# Patient Record
Sex: FEMALE | Race: WHITE | NOT HISPANIC OR LATINO | Employment: OTHER | URBAN - METROPOLITAN AREA
[De-identification: names, ages, dates, MRNs, and addresses within clinical notes are randomized per-mention and may not be internally consistent; named-entity substitution may affect disease eponyms.]

---

## 2017-09-18 ENCOUNTER — HOSPITAL ENCOUNTER (EMERGENCY)
Facility: HOSPITAL | Age: 42
Discharge: HOME | End: 2017-09-19
Attending: EMERGENCY MEDICINE
Payer: MEDICAID

## 2017-09-18 DIAGNOSIS — R10.31 RIGHT LOWER QUADRANT ABDOMINAL PAIN: Primary | ICD-10-CM

## 2017-09-18 PROCEDURE — 99284 EMERGENCY DEPT VISIT MOD MDM: CPT | Mod: 25

## 2017-09-18 PROCEDURE — 96375 TX/PRO/DX INJ NEW DRUG ADDON: CPT

## 2017-09-18 PROCEDURE — 96374 THER/PROPH/DIAG INJ IV PUSH: CPT

## 2017-09-18 RX ORDER — ONDANSETRON 2 MG/ML
4 INJECTION INTRAMUSCULAR; INTRAVENOUS
Status: COMPLETED | OUTPATIENT
Start: 2017-09-18 | End: 2017-09-19

## 2017-09-19 VITALS
OXYGEN SATURATION: 99 % | HEIGHT: 67 IN | DIASTOLIC BLOOD PRESSURE: 75 MMHG | WEIGHT: 145 LBS | TEMPERATURE: 98 F | SYSTOLIC BLOOD PRESSURE: 116 MMHG | RESPIRATION RATE: 18 BRPM | BODY MASS INDEX: 22.76 KG/M2 | HEART RATE: 84 BPM

## 2017-09-19 LAB
ALBUMIN SERPL BCP-MCNC: 3.3 G/DL
ALP SERPL-CCNC: 106 U/L
ALT SERPL W/O P-5'-P-CCNC: 13 U/L
ANION GAP SERPL CALC-SCNC: 10 MMOL/L
AST SERPL-CCNC: 18 U/L
BASOPHILS # BLD AUTO: 0.02 K/UL
BASOPHILS NFR BLD: 0.3 %
BILIRUB SERPL-MCNC: 0.2 MG/DL
BILIRUB UR QL STRIP: NEGATIVE
BUN SERPL-MCNC: 9 MG/DL
CALCIUM SERPL-MCNC: 9.5 MG/DL
CHLORIDE SERPL-SCNC: 105 MMOL/L
CLARITY UR: CLEAR
CO2 SERPL-SCNC: 25 MMOL/L
COLOR UR: YELLOW
CREAT SERPL-MCNC: 1 MG/DL
DIFFERENTIAL METHOD: ABNORMAL
EOSINOPHIL # BLD AUTO: 0.4 K/UL
EOSINOPHIL NFR BLD: 6.2 %
ERYTHROCYTE [DISTWIDTH] IN BLOOD BY AUTOMATED COUNT: 12.8 %
EST. GFR  (AFRICAN AMERICAN): >60 ML/MIN/1.73 M^2
EST. GFR  (NON AFRICAN AMERICAN): >60 ML/MIN/1.73 M^2
GLUCOSE SERPL-MCNC: 96 MG/DL
GLUCOSE UR QL STRIP: NEGATIVE
HCT VFR BLD AUTO: 37.2 %
HGB BLD-MCNC: 13.1 G/DL
HGB UR QL STRIP: NEGATIVE
KETONES UR QL STRIP: NEGATIVE
LEUKOCYTE ESTERASE UR QL STRIP: ABNORMAL
LIPASE SERPL-CCNC: 106 U/L
LYMPHOCYTES # BLD AUTO: 2.3 K/UL
LYMPHOCYTES NFR BLD: 36.4 %
MCH RBC QN AUTO: 32.3 PG
MCHC RBC AUTO-ENTMCNC: 35.2 G/DL
MCV RBC AUTO: 92 FL
MICROSCOPIC COMMENT: ABNORMAL
MONOCYTES # BLD AUTO: 0.3 K/UL
MONOCYTES NFR BLD: 5.4 %
NEUTROPHILS # BLD AUTO: 3.2 K/UL
NEUTROPHILS NFR BLD: 51.7 %
NITRITE UR QL STRIP: NEGATIVE
PH UR STRIP: 7 [PH] (ref 5–8)
PLATELET # BLD AUTO: 233 K/UL
PMV BLD AUTO: 9.8 FL
POTASSIUM SERPL-SCNC: 3.7 MMOL/L
PROT SERPL-MCNC: 7.1 G/DL
PROT UR QL STRIP: NEGATIVE
RBC # BLD AUTO: 4.06 M/UL
RBC #/AREA URNS HPF: 5 /HPF (ref 0–4)
SODIUM SERPL-SCNC: 140 MMOL/L
SP GR UR STRIP: 1.01 (ref 1–1.03)
URN SPEC COLLECT METH UR: ABNORMAL
UROBILINOGEN UR STRIP-ACNC: NEGATIVE EU/DL
WBC # BLD AUTO: 6.26 K/UL
WBC #/AREA URNS HPF: 1 /HPF (ref 0–5)

## 2017-09-19 PROCEDURE — 85025 COMPLETE CBC W/AUTO DIFF WBC: CPT

## 2017-09-19 PROCEDURE — 80053 COMPREHEN METABOLIC PANEL: CPT

## 2017-09-19 PROCEDURE — 83690 ASSAY OF LIPASE: CPT

## 2017-09-19 PROCEDURE — 81000 URINALYSIS NONAUTO W/SCOPE: CPT

## 2017-09-19 PROCEDURE — 63600175 PHARM REV CODE 636 W HCPCS: Performed by: EMERGENCY MEDICINE

## 2017-09-19 RX ORDER — KETOROLAC TROMETHAMINE 30 MG/ML
15 INJECTION, SOLUTION INTRAMUSCULAR; INTRAVENOUS
Status: COMPLETED | OUTPATIENT
Start: 2017-09-19 | End: 2017-09-19

## 2017-09-19 RX ORDER — ONDANSETRON 4 MG/1
4 TABLET, FILM COATED ORAL EVERY 8 HOURS PRN
Qty: 12 TABLET | Refills: 0 | Status: SHIPPED | OUTPATIENT
Start: 2017-09-19 | End: 2019-11-20

## 2017-09-19 RX ORDER — HYOSCYAMINE SULFATE 0.12 MG/1
0.12 TABLET SUBLINGUAL EVERY 6 HOURS PRN
Qty: 20 TABLET | Refills: 0 | Status: SHIPPED | OUTPATIENT
Start: 2017-09-19 | End: 2019-11-20

## 2017-09-19 RX ADMIN — KETOROLAC TROMETHAMINE 15 MG: 30 INJECTION, SOLUTION INTRAMUSCULAR at 12:09

## 2017-09-19 RX ADMIN — ONDANSETRON 4 MG: 2 INJECTION INTRAMUSCULAR; INTRAVENOUS at 12:09

## 2017-09-19 NOTE — ED PROVIDER NOTES
"SCRIBE #1 NOTE: I, Vicki Dyson, am scribing for, and in the presence of, Heriberto Luo MD. I have scribed the entire note.      History      Chief Complaint   Patient presents with    Abdominal Pain     RUQ abd pain "under right rib" onset friday. "I feel bloated."       Review of patient's allergies indicates:   Allergen Reactions    Compazine [prochlorperazine] Other (See Comments)     Panic attack    Pcn [penicillins] Hives        HPI   HPI    2017, 10:30 PM   History obtained from the patient      History of Present Illness: Jaqueline Gutierrez is a 42 y.o. female patient who presents to the Emergency Department for R sided abdominal pain which onset gradually 2 days ago. Symptoms are constant, moderate in severity, and described as feeling bloated. Food makes the pain worse. No mitigating factors reported. She states that she feels nauseated and had diarrhea 3 days ago, which has resolved. Patient denies fever, chills, vomiting, dysuria, hematuria, and all other sxs at this time. No further complaints or concerns at this time.       Arrival mode: Personal vehicle    PCP: [facility]       Past Medical History:  Past Medical History:   Diagnosis Date    Thyroid disease        Past Surgical History:  Past Surgical History:   Procedure Laterality Date     SECTION      HYSTERECTOMY      KNEE SURGERY           Family History:  History reviewed. No pertinent family history.    Social History:  Social History     Social History Main Topics    Smoking status: Current Every Day Smoker     Packs/day: 0.50     Types: Cigarettes    Smokeless tobacco: Never Used    Alcohol use Yes    Drug use: No    Sexual activity: Unknown       ROS   Review of Systems   Constitutional: Negative for chills and fever.   HENT: Negative for sore throat.    Respiratory: Negative for shortness of breath.    Cardiovascular: Negative for chest pain.   Gastrointestinal: Positive for abdominal pain, diarrhea " "(resolved) and nausea. Negative for abdominal distention, anal bleeding, blood in stool and vomiting.   Genitourinary: Negative for dysuria.   Musculoskeletal: Negative for back pain.   Skin: Negative for rash.   Neurological: Negative for dizziness, weakness, numbness and headaches.   Hematological: Does not bruise/bleed easily.   All other systems reviewed and are negative.      Physical Exam      Initial Vitals [09/18/17 2158]   BP Pulse Resp Temp SpO2   (!) 153/99 100 18 97.7 °F (36.5 °C) 100 %      MAP       117          Physical Exam  Nursing Notes and Vital Signs Reviewed.  Constitutional: Patient is in no acute distress. Awake and alert. Well-developed and well-nourished.  Head: Atraumatic. Normocephalic.  Eyes: PERRL. EOM intact. Conjunctivae are not pale. No scleral icterus.  ENT: Mucous membranes are moist. Oropharynx is clear and symmetric.    Neck: Supple. Full ROM. No lymphadenopathy.  Cardiovascular: Regular rate. Regular rhythm. No murmurs, rubs, or gallops. Distal pulses are 2+ and symmetric.  Pulmonary/Chest: No respiratory distress. Clear to auscultation bilaterally. No wheezing, rales, or rhonchi.  Abdominal: Soft and non-distended.  There is RLQ tenderness.  No rebound, guarding, or rigidity.  Good bowel sounds.    : No CVA tenderness.  Musculoskeletal: Moves all extremities. No obvious deformities. No edema. No calf tenderness.  Skin: Warm and dry.  Neurological:  Alert, awake, and appropriate.  Normal speech.  No acute focal neurological deficits are appreciated.  Psychiatric: Normal affect. Good eye contact. Appropriate in content.    ED Course    Procedures  ED Vital Signs:  Vitals:    09/18/17 2158 09/19/17 0011 09/19/17 0132   BP: (!) 153/99 135/84 116/75   Pulse: 100 85 84   Resp: 18     Temp: 97.7 °F (36.5 °C)     TempSrc: Oral     SpO2: 100% 99% 99%   Weight: 65.8 kg (145 lb)     Height: 5' 7" (1.702 m)         Abnormal Lab Results:  Labs Reviewed   CBC W/ AUTO DIFFERENTIAL - " Abnormal; Notable for the following:        Result Value    MCH 32.3 (*)     All other components within normal limits   COMPREHENSIVE METABOLIC PANEL - Abnormal; Notable for the following:     Albumin 3.3 (*)     All other components within normal limits   LIPASE - Abnormal; Notable for the following:     Lipase 106 (*)     All other components within normal limits   URINALYSIS - Abnormal; Notable for the following:     Leukocytes, UA Trace (*)     All other components within normal limits   URINALYSIS MICROSCOPIC - Abnormal; Notable for the following:     RBC, UA 5 (*)     All other components within normal limits        All Lab Results:  Results for orders placed or performed during the hospital encounter of 09/18/17   CBC W/ AUTO DIFFERENTIAL   Result Value Ref Range    WBC 6.26 3.90 - 12.70 K/uL    RBC 4.06 4.00 - 5.40 M/uL    Hemoglobin 13.1 12.0 - 16.0 g/dL    Hematocrit 37.2 37.0 - 48.5 %    MCV 92 82 - 98 fL    MCH 32.3 (H) 27.0 - 31.0 pg    MCHC 35.2 32.0 - 36.0 g/dL    RDW 12.8 11.5 - 14.5 %    Platelets 233 150 - 350 K/uL    MPV 9.8 9.2 - 12.9 fL    Gran # 3.2 1.8 - 7.7 K/uL    Lymph # 2.3 1.0 - 4.8 K/uL    Mono # 0.3 0.3 - 1.0 K/uL    Eos # 0.4 0.0 - 0.5 K/uL    Baso # 0.02 0.00 - 0.20 K/uL    Gran% 51.7 38.0 - 73.0 %    Lymph% 36.4 18.0 - 48.0 %    Mono% 5.4 4.0 - 15.0 %    Eosinophil% 6.2 0.0 - 8.0 %    Basophil% 0.3 0.0 - 1.9 %    Differential Method Automated    Comp. Metabolic Panel   Result Value Ref Range    Sodium 140 136 - 145 mmol/L    Potassium 3.7 3.5 - 5.1 mmol/L    Chloride 105 95 - 110 mmol/L    CO2 25 23 - 29 mmol/L    Glucose 96 70 - 110 mg/dL    BUN, Bld 9 6 - 20 mg/dL    Creatinine 1.0 0.5 - 1.4 mg/dL    Calcium 9.5 8.7 - 10.5 mg/dL    Total Protein 7.1 6.0 - 8.4 g/dL    Albumin 3.3 (L) 3.5 - 5.2 g/dL    Total Bilirubin 0.2 0.1 - 1.0 mg/dL    Alkaline Phosphatase 106 55 - 135 U/L    AST 18 10 - 40 U/L    ALT 13 10 - 44 U/L    Anion Gap 10 8 - 16 mmol/L    eGFR if African American >60  >60 mL/min/1.73 m^2    eGFR if non African American >60 >60 mL/min/1.73 m^2   Lipase   Result Value Ref Range    Lipase 106 (H) 4 - 60 U/L   Urinalysis - Clean Catch   Result Value Ref Range    Specimen UA Urine, Clean Catch     Color, UA Yellow Yellow, Straw, Magali    Appearance, UA Clear Clear    pH, UA 7.0 5.0 - 8.0    Specific Gravity, UA 1.010 1.005 - 1.030    Protein, UA Negative Negative    Glucose, UA Negative Negative    Ketones, UA Negative Negative    Bilirubin (UA) Negative Negative    Occult Blood UA Negative Negative    Nitrite, UA Negative Negative    Urobilinogen, UA Negative <2.0 EU/dL    Leukocytes, UA Trace (A) Negative   Urinalysis Microscopic   Result Value Ref Range    RBC, UA 5 (H) 0 - 4 /hpf    WBC, UA 1 0 - 5 /hpf    Microscopic Comment SEE COMMENT      Imaging Results:  Imaging Results          CT Abdomen Pelvis  Without Contrast (Final result)  Result time 09/18/17 23:11:13    Final result by Nando David MD (09/18/17 23:11:13)                 Impression:      Contracted thick wall gallbladder. Evidence of constipation.    All CT scans at this facility use dose modulation, iterative reconstruction and/or weight based dosing when appropriate to reduce radiation dose to as low as reasonably achievable.       Electronically signed by: NANDO DAVID MD  Date:     09/18/17  Time:    23:11              Narrative:    Exam: CT ABDOMEN PELVIS WITHOUT CONTRAST,    Date:  09/18/17 22:40:00    History: Right upper quadrant abdominal pain        Findings: The gallbladder is contracted and thick walled. No abnormality of the unenhanced liver, spleen, pancreas, adrenals, or kidneys is seen. No evidence of bowel obstruction. No free fluid or free air. Evidence of hysterectomy. Moderately large amount of stool in the colon consistent with constipation. The lung bases are clear.                               The Emergency Provider reviewed the vital signs and test results, which are outlined  above.    ED Discussion   1:27 AM: Reassessed pt at this time. Discussed with pt all pertinent ED information and results. Discussed pt dx and plan of tx. Gave pt all f/u and return to the ED instructions. All questions and concerns were addressed at this time. Pt expresses understanding of information and instructions, and is comfortable with plan to discharge. Pt is stable for discharge.    I discussed with patient and/or family/caretaker that evaluation in the ED does not suggest any emergent or life threatening medical conditions requiring immediate intervention beyond what was provided in the ED, and I believe patient is safe for discharge.  Regardless, an unremarkable evaluation in the ED does not preclude the development or presence of a serious of life threatening condition. As such, patient was instructed to return immediately for any worsening or change in current symptoms.      ED Medication(s):  Medications   ondansetron injection 4 mg (4 mg Intravenous Given 9/19/17 0033)   ketorolac injection 15 mg (15 mg Intravenous Given 9/19/17 0034)       Discharge Medication List as of 9/19/2017  1:59 AM      START taking these medications    Details   hyoscyamine (LEVSIN/SL) 0.125 mg Subl Place 1 tablet (0.125 mg total) under the tongue every 6 (six) hours as needed., Starting Tue 9/19/2017, Print      ondansetron (ZOFRAN) 4 MG tablet Take 1 tablet (4 mg total) by mouth every 8 (eight) hours as needed for Nausea., Starting Tue 9/19/2017, Print             Follow-up Information     [facility]    Contact information:  [address] [facility] [address] [phone]             [facility] - .    Specialty:  Emergency Medicine  Why:  As needed, If symptoms worsen  Contact information:  [address] [facility] [address] [phone]                  Medical Decision Making    Medical Decision Making:   Clinical Tests:   Lab Tests: Reviewed  and Ordered  Radiological Study: Reviewed and Ordered           Scribe Attestation:   Scribe #1: I performed the above scribed service and the documentation accurately describes the services I performed. I attest to the accuracy of the note.    Attending:   Physician Attestation Statement for Scribe #1: I, Heriberto Luo MD, personally performed the services described in this documentation, as scribed by Vicki Dyson, in my presence, and it is both accurate and complete.          Clinical Impression       ICD-10-CM ICD-9-CM   1. Right lower quadrant abdominal pain R10.31 789.03       Disposition:   Disposition: Discharged  Condition: Stable         Heriberto Luo MD  09/19/17 0544

## 2019-11-20 ENCOUNTER — HOSPITAL ENCOUNTER (EMERGENCY)
Facility: HOSPITAL | Age: 44
Discharge: HOME | End: 2019-11-20
Attending: EMERGENCY MEDICINE
Payer: MEDICAID

## 2019-11-20 VITALS
RESPIRATION RATE: 20 BRPM | HEART RATE: 78 BPM | WEIGHT: 171.5 LBS | TEMPERATURE: 98 F | DIASTOLIC BLOOD PRESSURE: 84 MMHG | BODY MASS INDEX: 26.92 KG/M2 | OXYGEN SATURATION: 98 % | HEIGHT: 67 IN | SYSTOLIC BLOOD PRESSURE: 118 MMHG

## 2019-11-20 DIAGNOSIS — R10.84 GENERALIZED ABDOMINAL PAIN: ICD-10-CM

## 2019-11-20 DIAGNOSIS — K59.01 SLOW TRANSIT CONSTIPATION: Primary | ICD-10-CM

## 2019-11-20 LAB
ALBUMIN SERPL BCP-MCNC: 3.8 G/DL (ref 3.5–5.2)
ALP SERPL-CCNC: 107 U/L (ref 55–135)
ALT SERPL W/O P-5'-P-CCNC: 30 U/L (ref 10–44)
ANION GAP SERPL CALC-SCNC: 10 MMOL/L (ref 8–16)
AST SERPL-CCNC: 31 U/L (ref 10–40)
BASOPHILS # BLD AUTO: 0.07 K/UL (ref 0–0.2)
BASOPHILS NFR BLD: 1.2 % (ref 0–1.9)
BILIRUB SERPL-MCNC: 0.4 MG/DL (ref 0.1–1)
BILIRUB UR QL STRIP: NEGATIVE
BUN SERPL-MCNC: 10 MG/DL (ref 6–20)
CALCIUM SERPL-MCNC: 9.7 MG/DL (ref 8.7–10.5)
CHLORIDE SERPL-SCNC: 104 MMOL/L (ref 95–110)
CLARITY UR: CLEAR
CO2 SERPL-SCNC: 23 MMOL/L (ref 23–29)
COLOR UR: YELLOW
CREAT SERPL-MCNC: 1 MG/DL (ref 0.5–1.4)
DIFFERENTIAL METHOD: ABNORMAL
EOSINOPHIL # BLD AUTO: 0.8 K/UL (ref 0–0.5)
EOSINOPHIL NFR BLD: 14 % (ref 0–8)
ERYTHROCYTE [DISTWIDTH] IN BLOOD BY AUTOMATED COUNT: 15.5 % (ref 11.5–14.5)
EST. GFR  (AFRICAN AMERICAN): >60 ML/MIN/1.73 M^2
EST. GFR  (NON AFRICAN AMERICAN): >60 ML/MIN/1.73 M^2
GLUCOSE SERPL-MCNC: 85 MG/DL (ref 70–110)
GLUCOSE UR QL STRIP: NEGATIVE
HCT VFR BLD AUTO: 39.6 % (ref 37–48.5)
HGB BLD-MCNC: 12.2 G/DL (ref 12–16)
HGB UR QL STRIP: NEGATIVE
IMM GRANULOCYTES # BLD AUTO: 0.01 K/UL (ref 0–0.04)
IMM GRANULOCYTES NFR BLD AUTO: 0.2 % (ref 0–0.5)
KETONES UR QL STRIP: NEGATIVE
LEUKOCYTE ESTERASE UR QL STRIP: NEGATIVE
LYMPHOCYTES # BLD AUTO: 1.7 K/UL (ref 1–4.8)
LYMPHOCYTES NFR BLD: 28.5 % (ref 18–48)
MCH RBC QN AUTO: 26.1 PG (ref 27–31)
MCHC RBC AUTO-ENTMCNC: 30.8 G/DL (ref 32–36)
MCV RBC AUTO: 85 FL (ref 82–98)
MONOCYTES # BLD AUTO: 0.4 K/UL (ref 0.3–1)
MONOCYTES NFR BLD: 6.5 % (ref 4–15)
NEUTROPHILS # BLD AUTO: 2.9 K/UL (ref 1.8–7.7)
NEUTROPHILS NFR BLD: 49.6 % (ref 38–73)
NITRITE UR QL STRIP: NEGATIVE
NRBC BLD-RTO: 0 /100 WBC
PH UR STRIP: 6 [PH] (ref 5–8)
PLATELET # BLD AUTO: 340 K/UL (ref 150–350)
PMV BLD AUTO: 9.6 FL (ref 9.2–12.9)
POTASSIUM SERPL-SCNC: 3.9 MMOL/L (ref 3.5–5.1)
PROT SERPL-MCNC: 7.7 G/DL (ref 6–8.4)
PROT UR QL STRIP: NEGATIVE
RBC # BLD AUTO: 4.68 M/UL (ref 4–5.4)
SODIUM SERPL-SCNC: 137 MMOL/L (ref 136–145)
SP GR UR STRIP: <=1.005 (ref 1–1.03)
URN SPEC COLLECT METH UR: ABNORMAL
UROBILINOGEN UR STRIP-ACNC: NEGATIVE EU/DL
WBC # BLD AUTO: 5.86 K/UL (ref 3.9–12.7)

## 2019-11-20 PROCEDURE — 99284 EMERGENCY DEPT VISIT MOD MDM: CPT | Mod: 25

## 2019-11-20 PROCEDURE — 81003 URINALYSIS AUTO W/O SCOPE: CPT

## 2019-11-20 PROCEDURE — 80053 COMPREHEN METABOLIC PANEL: CPT

## 2019-11-20 PROCEDURE — 85025 COMPLETE CBC W/AUTO DIFF WBC: CPT

## 2019-11-20 RX ORDER — DOCUSATE SODIUM 100 MG/1
100 CAPSULE, LIQUID FILLED ORAL 3 TIMES DAILY PRN
Qty: 30 CAPSULE | Refills: 0 | OUTPATIENT
Start: 2019-11-20 | End: 2023-12-30

## 2019-11-20 RX ORDER — POLYETHYLENE GLYCOL 3350 17 G/17G
17 POWDER, FOR SOLUTION ORAL DAILY
Qty: 119 G | Refills: 0 | Status: SHIPPED | OUTPATIENT
Start: 2019-11-20 | End: 2019-11-27

## 2019-11-20 RX ORDER — LEVOTHYROXINE SODIUM 175 UG/1
175 TABLET ORAL DAILY
Qty: 30 TABLET | Refills: 11 | Status: SHIPPED | OUTPATIENT
Start: 2019-11-20 | End: 2024-01-31

## 2019-11-20 NOTE — ED NOTES
Patient c/o pain to RLQ and nausea beginning three days ago.    Patient identifiers verified and correct for Jaqueline Gutierrez.    LOC: The patient is awake, alert and aware of environment with an appropriate affect, the patient is oriented x 3 and speaking appropriately.  APPEARANCE: Patient resting comfortably and in no acute distress, patient is clean and well groomed, patient's clothing is properly fastened.  SKIN: The skin is warm and dry, color consistent with ethnicity, patient has normal skin turgor and moist mucus membranes, skin intact, no breakdown or bruising noted.  MUSCULOSKELETAL: Patient moving all extremities spontaneously.  RESPIRATORY: Airway is open and patent, respirations are spontaneous.  CARDIAC: Patient has a normal rate, no peripheral edema noted, capillary refill < 3 seconds.  ABDOMEN: Soft; RLQ tender to palpation.

## 2019-11-20 NOTE — ED PROVIDER NOTES
11/20/2019, 11:37 AM   History obtained from the patient      History of Present Illness: Jaqueline Gutierrez is a 44 y.o. female patient who presents to the Emergency Department for RLQ abdominal pain which onset gradually 2 days ago.    11:37 AM: Pt was placed in TL. I explained to pt that due to lack of available rooms pt was seen by myself to begin the workup. Pt understands they will be seen and dispositioned by the next available physician. Pt voices understanding and agrees with plan. Pt also understands the longer than normal wait time. I am removing myself from the care of pt and pt will now be assigned to the next available physician.           SCRIBE #1 NOTE: I, Jerel Leal, am scribing for, and in the presence of, Isaac Lugo MD. I have scribed the entire note.       History     Chief Complaint   Patient presents with    Abdominal Pain     Pt c/o of RLQ abd pain with nausea xs a couple of days. Pain with palpation and sitting up. Denies fevers.      Review of patient's allergies indicates:   Allergen Reactions    Compazine [prochlorperazine] Other (See Comments)     Panic attack    Pcn [penicillins] Hives         History of Present Illness     HPI    11/20/2019, 12:15 PM  History obtained from the patient      History of Present Illness: Jaqueline Gutierrez is a 44 y.o. female patient who presents to the Emergency Department for evaluation of RLQ abd pain which onset gradually 2 days ago. Symptoms are constant and moderate in severity. Sx are exacerbated by sitting upright. Associated sxs include nausea. Patient denies any fever, v/d, constipation, blood in stool, dysuria, hematuria, and all other sxs at this time. No prior Tx reported. No further complaints or concerns at this time.       Arrival mode: Personal transportation    PCP: [facility]        Past Medical History:  Past Medical History:   Diagnosis Date    Thyroid disease        Past Surgical History:  Past Surgical  History:   Procedure Laterality Date     SECTION      HYSTERECTOMY      KNEE SURGERY           Family History:  History reviewed. No pertinent family history.    Social History:  Social History     Tobacco Use    Smoking status: Current Every Day Smoker     Packs/day: 0.50     Types: Cigarettes    Smokeless tobacco: Never Used   Substance and Sexual Activity    Alcohol use: Yes     Comment: occasional    Drug use: No    Sexual activity: unknown        Review of Systems     Review of Systems   Constitutional: Negative for fever.   HENT: Negative for sore throat.    Respiratory: Negative for shortness of breath.    Cardiovascular: Negative for chest pain.   Gastrointestinal: Positive for abdominal pain and nausea. Negative for blood in stool, constipation, diarrhea and vomiting.   Genitourinary: Negative for dysuria and hematuria.   Musculoskeletal: Negative for back pain.   Skin: Negative for rash.   Neurological: Negative for weakness.   Hematological: Does not bruise/bleed easily.   All other systems reviewed and are negative.       Physical Exam     Initial Vitals   BP Pulse Resp Temp SpO2   19 1136 19 1136 19 1136 19 1138 19 1136   129/82 97 20 98.3 °F (36.8 °C) 98 %      MAP       --                 Physical Exam  Nursing Notes and Vital Signs Reviewed.  Constitutional: Well-developed and well-nourished. NAD.  Head: Atraumatic. Normocephalic.  Eyes: PERRL. EOM intact. Conjunctivae are not pale. No scleral icterus.  ENT: Mucous membranes are moist. Oropharynx is clear and symmetric.    Neck: Supple. Full ROM. No lymphadenopathy.  Cardiovascular: Regular rate. Regular rhythm. No murmurs, rubs, or gallops. Distal pulses are 2+ and symmetric.  Pulmonary/Chest: No respiratory distress. Clear to auscultation bilaterally. No wheezing or rales.  Abdominal: Soft and non-distended.  There is no tenderness.  No rebound, guarding, or rigidity. Good bowel sounds.  Genitourinary:  "No CVA tenderness  Musculoskeletal: Moves all extremities. No obvious deformities. No calf tenderness.  Skin: Warm and dry.  Neurological:  Alert, awake, and appropriate.  Normal speech.  No acute focal neurological deficits are appreciated.  Psychiatric: Normal affect. Good eye contact. Appropriate in content.     ED Course   Procedures  ED Vital Signs:  Vitals:    11/20/19 1136 11/20/19 1138 11/20/19 1400   BP: 129/82  118/84   Pulse: 97  78   Resp: 20  20   Temp:  98.3 °F (36.8 °C) 98.3 °F (36.8 °C)   TempSrc: Oral Axillary Oral   SpO2: 98%  98%   Weight: 77.8 kg (171 lb 8.3 oz)     Height: 5' 7" (1.702 m)         Abnormal Lab Results:  Labs Reviewed   CBC W/ AUTO DIFFERENTIAL - Abnormal; Notable for the following components:       Result Value    Mean Corpuscular Hemoglobin 26.1 (*)     Mean Corpuscular Hemoglobin Conc 30.8 (*)     RDW 15.5 (*)     Eos # 0.8 (*)     Eosinophil% 14.0 (*)     All other components within normal limits   URINALYSIS, REFLEX TO URINE CULTURE - Abnormal; Notable for the following components:    Specific Gravity, UA <=1.005 (*)     All other components within normal limits    Narrative:     Preferred Collection Type->Urine, Clean Catch   COMPREHENSIVE METABOLIC PANEL        All Lab Results:  Results for orders placed or performed during the hospital encounter of 11/20/19   CBC auto differential   Result Value Ref Range    WBC 5.86 3.90 - 12.70 K/uL    RBC 4.68 4.00 - 5.40 M/uL    Hemoglobin 12.2 12.0 - 16.0 g/dL    Hematocrit 39.6 37.0 - 48.5 %    Mean Corpuscular Volume 85 82 - 98 fL    Mean Corpuscular Hemoglobin 26.1 (L) 27.0 - 31.0 pg    Mean Corpuscular Hemoglobin Conc 30.8 (L) 32.0 - 36.0 g/dL    RDW 15.5 (H) 11.5 - 14.5 %    Platelets 340 150 - 350 K/uL    MPV 9.6 9.2 - 12.9 fL    Immature Granulocytes 0.2 0.0 - 0.5 %    Gran # (ANC) 2.9 1.8 - 7.7 K/uL    Immature Grans (Abs) 0.01 0.00 - 0.04 K/uL    Lymph # 1.7 1.0 - 4.8 K/uL    Mono # 0.4 0.3 - 1.0 K/uL    Eos # 0.8 (H) 0.0 - " 0.5 K/uL    Baso # 0.07 0.00 - 0.20 K/uL    nRBC 0 0 /100 WBC    Gran% 49.6 38.0 - 73.0 %    Lymph% 28.5 18.0 - 48.0 %    Mono% 6.5 4.0 - 15.0 %    Eosinophil% 14.0 (H) 0.0 - 8.0 %    Basophil% 1.2 0.0 - 1.9 %    Differential Method Automated    Comprehensive metabolic panel   Result Value Ref Range    Sodium 137 136 - 145 mmol/L    Potassium 3.9 3.5 - 5.1 mmol/L    Chloride 104 95 - 110 mmol/L    CO2 23 23 - 29 mmol/L    Glucose 85 70 - 110 mg/dL    BUN, Bld 10 6 - 20 mg/dL    Creatinine 1.0 0.5 - 1.4 mg/dL    Calcium 9.7 8.7 - 10.5 mg/dL    Total Protein 7.7 6.0 - 8.4 g/dL    Albumin 3.8 3.5 - 5.2 g/dL    Total Bilirubin 0.4 0.1 - 1.0 mg/dL    Alkaline Phosphatase 107 55 - 135 U/L    AST 31 10 - 40 U/L    ALT 30 10 - 44 U/L    Anion Gap 10 8 - 16 mmol/L    eGFR if African American >60 >60 mL/min/1.73 m^2    eGFR if non African American >60 >60 mL/min/1.73 m^2   Urinalysis, Reflex to Urine Culture Urine, Clean Catch   Result Value Ref Range    Specimen UA Urine, Clean Catch     Color, UA Yellow Yellow, Straw, Magali    Appearance, UA Clear Clear    pH, UA 6.0 5.0 - 8.0    Specific Gravity, UA <=1.005 (A) 1.005 - 1.030    Protein, UA Negative Negative    Glucose, UA Negative Negative    Ketones, UA Negative Negative    Bilirubin (UA) Negative Negative    Occult Blood UA Negative Negative    Nitrite, UA Negative Negative    Urobilinogen, UA Negative <2.0 EU/dL    Leukocytes, UA Negative Negative         Imaging Results:  Imaging Results          CT Renal Stone Study ABD Pelvis WO (Final result)  Result time 11/20/19 13:54:51    Final result by Dylan Park MD (11/20/19 13:54:51)                 Impression:      No evidence of renal, ureteral, or bladder stones.  No hydronephrosis.    Fecalization of distal small bowel indicating slow transit of small bowel contents. No evidence of bowel obstruction.  Normal appendix. Moderate volume stool cecum, right colon, descending colon, sigmoid.      Electronically signed  by: Dylan Park  Date:    11/20/2019  Time:    13:54             Narrative:    EXAMINATION:  CT RENAL STONE STUDY ABD PELVIS WO    CLINICAL HISTORY:  Flank pain, stone disease suspected;    TECHNIQUE:  Low dose axial images, sagittal and coronal reformations were obtained from the lung bases to the pubic symphysis.  Contrast was not administered.    All CT scans at this location are performed using dose modulation techniques as appropriate to a performed exam including the following: Automated exposure control; adjustment of the mA and/or kV according to patient size.    COMPARISON:  09/18/2017    FINDINGS:  Heart: Normal in size. No pericardial effusion.    Lung Bases: Well aerated, without consolidation or pleural fluid.    Liver: Normal in size and attenuation, with no focal hepatic lesions.    Gallbladder: No calcified gallstones.    Bile Ducts: No evidence of dilated ducts.    Pancreas: No mass or peripancreatic fat stranding.    Spleen: Unremarkable.    Adrenals: Unremarkable.    Kidneys/ Ureters: Unremarkable.    Bladder: No evidence of wall thickening.    Reproductive organs: Uterus and ovaries surgically absent    GI Tract/Mesentery: Stomach, duodenum unremarkable. Fecalization of distal small bowel indicating slow transit of small bowel contents. No evidence of bowel obstruction.  Normal appendix.  Moderate volume stool cecum, right colon, descending colon, sigmoid.    Peritoneal Space: No ascites. No free air.    Retroperitoneum: No significant adenopathy.    Abdominal wall: Unremarkable.    Vasculature: No significant atherosclerosis or aneurysm.    Bones: No acute fracture.                                     The Emergency Provider reviewed the vital signs and test results, which are outlined above.     ED Discussion       2:04 PM: Reassessed pt at this time.  Pt states her condition has improved at this time. Discussed with pt all pertinent ED information and results. Discussed pt dx and plan of tx.  Gave pt all f/u and return to the ED instructions. All questions and concerns were addressed at this time. Pt expresses understanding of information and instructions, and is comfortable with plan to discharge. Pt is stable for discharge.    I discussed with patient and/or family/caretaker that evaluation in the ED does not suggest any emergent or life threatening medical conditions requiring immediate intervention beyond what was provided in the ED, and I believe patient is safe for discharge.  Regardless, an unremarkable evaluation in the ED does not preclude the development or presence of a serious of life threatening condition. As such, patient was instructed to return immediately for any worsening or change in current symptoms.       MDM        Medical Decision Making:   Clinical Tests:   Lab Tests: Ordered and Reviewed  Radiological Study: Reviewed and Ordered           ED Medication(s):  Medications - No data to display    New Prescriptions    DOCUSATE SODIUM (COLACE) 100 MG CAPSULE    Take 1 capsule (100 mg total) by mouth 3 (three) times daily as needed for Constipation.    LEVOTHYROXINE (SYNTHROID, LEVOTHROID) 175 MCG TABLET    Take 1 tablet (175 mcg total) by mouth once daily.    POLYETHYLENE GLYCOL (GLYCOLAX) 17 GRAM/DOSE POWDER    Take 17 g by mouth once daily. for 7 days        Medication List      START taking these medications    docusate sodium 100 MG capsule  Commonly known as:  COLACE  Take 1 capsule (100 mg total) by mouth 3 (three) times daily as needed for Constipation.     levothyroxine 175 MCG tablet  Commonly known as:  SYNTHROID, LEVOTHROID  Take 1 tablet (175 mcg total) by mouth once daily.     polyethylene glycol 17 gram/dose powder  Commonly known as:  GLYCOLAX  Take 17 g by mouth once daily. for 7 days           Where to Get Your Medications      You can get these medications from any pharmacy    Bring a paper prescription for each of these medications  · docusate sodium 100 MG  capsule  · levothyroxine 175 MCG tablet  · polyethylene glycol 17 gram/dose powder         Follow-up Information     [facility] In 2 days.    Contact information:  [address] [facility] [address] [phone]                       Scribe Attestation:   Scribe #1: I performed the above scribed service and the documentation accurately describes the services I performed. I attest to the accuracy of the note.     Attending:   Physician Attestation Statement for Scribe #1: I, Isaac Lugo MD, personally performed the services described in this documentation, as scribed by Jerel Leal, in my presence, and it is both accurate and complete.           Clinical Impression       ICD-10-CM ICD-9-CM   1. Slow transit constipation K59.01 564.01   2. Generalized abdominal pain R10.84 789.07       Disposition:   Disposition: Discharged  Condition: Stable         Isaac Lugo MD  11/20/19 5515

## 2021-07-17 ENCOUNTER — HOSPITAL ENCOUNTER (EMERGENCY)
Facility: HOSPITAL | Age: 46
Discharge: HOME | End: 2021-07-17
Attending: EMERGENCY MEDICINE
Payer: MEDICAID

## 2021-07-17 VITALS
RESPIRATION RATE: 20 BRPM | WEIGHT: 173.94 LBS | SYSTOLIC BLOOD PRESSURE: 146 MMHG | TEMPERATURE: 98 F | OXYGEN SATURATION: 99 % | DIASTOLIC BLOOD PRESSURE: 92 MMHG | HEART RATE: 83 BPM | HEIGHT: 67 IN | BODY MASS INDEX: 27.3 KG/M2

## 2021-07-17 DIAGNOSIS — R06.02 SOB (SHORTNESS OF BREATH): ICD-10-CM

## 2021-07-17 DIAGNOSIS — F41.1 ANXIETY REACTION: Primary | ICD-10-CM

## 2021-07-17 DIAGNOSIS — T43.625A ADVERSE EFFECT OF AMPHETAMINES, INITIAL ENCOUNTER: ICD-10-CM

## 2021-07-17 LAB
ALBUMIN SERPL BCP-MCNC: 3.7 G/DL (ref 3.5–5.2)
ALP SERPL-CCNC: 102 U/L (ref 55–135)
ALT SERPL W/O P-5'-P-CCNC: 23 U/L (ref 10–44)
AMPHET+METHAMPHET UR QL: ABNORMAL
ANION GAP SERPL CALC-SCNC: 11 MMOL/L (ref 8–16)
AST SERPL-CCNC: 21 U/L (ref 10–40)
B-HCG UR QL: NEGATIVE
BARBITURATES UR QL SCN>200 NG/ML: NEGATIVE
BASOPHILS # BLD AUTO: 0.05 K/UL (ref 0–0.2)
BASOPHILS NFR BLD: 0.9 % (ref 0–1.9)
BENZODIAZ UR QL SCN>200 NG/ML: NEGATIVE
BILIRUB SERPL-MCNC: 0.3 MG/DL (ref 0.1–1)
BILIRUB UR QL STRIP: NEGATIVE
BNP SERPL-MCNC: 11 PG/ML (ref 0–99)
BUN SERPL-MCNC: 12 MG/DL (ref 6–20)
BZE UR QL SCN: NEGATIVE
CALCIUM SERPL-MCNC: 9.3 MG/DL (ref 8.7–10.5)
CANNABINOIDS UR QL SCN: NEGATIVE
CHLORIDE SERPL-SCNC: 105 MMOL/L (ref 95–110)
CLARITY UR: CLEAR
CO2 SERPL-SCNC: 24 MMOL/L (ref 23–29)
COLOR UR: YELLOW
CREAT SERPL-MCNC: 1.1 MG/DL (ref 0.5–1.4)
CREAT UR-MCNC: 57.7 MG/DL (ref 15–325)
DIFFERENTIAL METHOD: ABNORMAL
EOSINOPHIL # BLD AUTO: 0.7 K/UL (ref 0–0.5)
EOSINOPHIL NFR BLD: 12.1 % (ref 0–8)
ERYTHROCYTE [DISTWIDTH] IN BLOOD BY AUTOMATED COUNT: 13.2 % (ref 11.5–14.5)
EST. GFR  (AFRICAN AMERICAN): >60 ML/MIN/1.73 M^2
EST. GFR  (NON AFRICAN AMERICAN): >60 ML/MIN/1.73 M^2
ETHANOL SERPL-MCNC: <10 MG/DL
GLUCOSE SERPL-MCNC: 98 MG/DL (ref 70–110)
GLUCOSE UR QL STRIP: NEGATIVE
HCT VFR BLD AUTO: 44.2 % (ref 37–48.5)
HCV AB SERPL QL IA: NEGATIVE
HEP C VIRUS HOLD SPECIMEN: NORMAL
HGB BLD-MCNC: 14.1 G/DL (ref 12–16)
HGB UR QL STRIP: NEGATIVE
HIV 1+2 AB+HIV1 P24 AG SERPL QL IA: NEGATIVE
IMM GRANULOCYTES # BLD AUTO: 0.01 K/UL (ref 0–0.04)
IMM GRANULOCYTES NFR BLD AUTO: 0.2 % (ref 0–0.5)
KETONES UR QL STRIP: NEGATIVE
LEUKOCYTE ESTERASE UR QL STRIP: NEGATIVE
LYMPHOCYTES # BLD AUTO: 2.1 K/UL (ref 1–4.8)
LYMPHOCYTES NFR BLD: 36.5 % (ref 18–48)
MCH RBC QN AUTO: 28.8 PG (ref 27–31)
MCHC RBC AUTO-ENTMCNC: 31.9 G/DL (ref 32–36)
MCV RBC AUTO: 90 FL (ref 82–98)
METHADONE UR QL SCN>300 NG/ML: NEGATIVE
MONOCYTES # BLD AUTO: 0.4 K/UL (ref 0.3–1)
MONOCYTES NFR BLD: 6.8 % (ref 4–15)
NEUTROPHILS # BLD AUTO: 2.5 K/UL (ref 1.8–7.7)
NEUTROPHILS NFR BLD: 43.5 % (ref 38–73)
NITRITE UR QL STRIP: NEGATIVE
NRBC BLD-RTO: 0 /100 WBC
OPIATES UR QL SCN: NEGATIVE
PCP UR QL SCN>25 NG/ML: NEGATIVE
PH UR STRIP: 7 [PH] (ref 5–8)
PLATELET # BLD AUTO: 294 K/UL (ref 150–450)
PMV BLD AUTO: 9.7 FL (ref 9.2–12.9)
POTASSIUM SERPL-SCNC: 4 MMOL/L (ref 3.5–5.1)
PROT SERPL-MCNC: 7.2 G/DL (ref 6–8.4)
PROT UR QL STRIP: NEGATIVE
RBC # BLD AUTO: 4.9 M/UL (ref 4–5.4)
SODIUM SERPL-SCNC: 140 MMOL/L (ref 136–145)
SP GR UR STRIP: 1.02 (ref 1–1.03)
TOXICOLOGY INFORMATION: ABNORMAL
TROPONIN I SERPL DL<=0.01 NG/ML-MCNC: 0.01 NG/ML (ref 0–0.03)
TROPONIN I SERPL DL<=0.01 NG/ML-MCNC: 0.01 NG/ML (ref 0–0.03)
TSH SERPL DL<=0.005 MIU/L-ACNC: 1.36 UIU/ML (ref 0.4–4)
URN SPEC COLLECT METH UR: NORMAL
UROBILINOGEN UR STRIP-ACNC: NEGATIVE EU/DL
WBC # BLD AUTO: 5.7 K/UL (ref 3.9–12.7)

## 2021-07-17 PROCEDURE — 87389 HIV-1 AG W/HIV-1&-2 AB AG IA: CPT | Performed by: FAMILY MEDICINE

## 2021-07-17 PROCEDURE — 81003 URINALYSIS AUTO W/O SCOPE: CPT | Mod: 59 | Performed by: EMERGENCY MEDICINE

## 2021-07-17 PROCEDURE — 81025 URINE PREGNANCY TEST: CPT | Performed by: EMERGENCY MEDICINE

## 2021-07-17 PROCEDURE — 25500020 PHARM REV CODE 255: Performed by: EMERGENCY MEDICINE

## 2021-07-17 PROCEDURE — 80307 DRUG TEST PRSMV CHEM ANLYZR: CPT | Performed by: EMERGENCY MEDICINE

## 2021-07-17 PROCEDURE — 25000003 PHARM REV CODE 250: Performed by: EMERGENCY MEDICINE

## 2021-07-17 PROCEDURE — 82077 ASSAY SPEC XCP UR&BREATH IA: CPT | Performed by: EMERGENCY MEDICINE

## 2021-07-17 PROCEDURE — 83880 ASSAY OF NATRIURETIC PEPTIDE: CPT | Performed by: EMERGENCY MEDICINE

## 2021-07-17 PROCEDURE — 80053 COMPREHEN METABOLIC PANEL: CPT | Performed by: EMERGENCY MEDICINE

## 2021-07-17 PROCEDURE — 84484 ASSAY OF TROPONIN QUANT: CPT | Mod: 91 | Performed by: EMERGENCY MEDICINE

## 2021-07-17 PROCEDURE — 99285 EMERGENCY DEPT VISIT HI MDM: CPT | Mod: 25

## 2021-07-17 PROCEDURE — 84443 ASSAY THYROID STIM HORMONE: CPT | Performed by: EMERGENCY MEDICINE

## 2021-07-17 PROCEDURE — 85025 COMPLETE CBC W/AUTO DIFF WBC: CPT | Performed by: EMERGENCY MEDICINE

## 2021-07-17 PROCEDURE — 86803 HEPATITIS C AB TEST: CPT | Performed by: FAMILY MEDICINE

## 2021-07-17 RX ORDER — BUTALBITAL, ACETAMINOPHEN AND CAFFEINE 50; 325; 40 MG/1; MG/1; MG/1
1 TABLET ORAL
Status: COMPLETED | OUTPATIENT
Start: 2021-07-17 | End: 2021-07-17

## 2021-07-17 RX ADMIN — BUTALBITAL, ACETAMINOPHEN, AND CAFFEINE 1 TABLET: 50; 325; 40 TABLET ORAL at 06:07

## 2021-07-17 RX ADMIN — IOHEXOL 100 ML: 350 INJECTION, SOLUTION INTRAVENOUS at 09:07

## 2023-12-30 ENCOUNTER — HOSPITAL ENCOUNTER (EMERGENCY)
Facility: HOSPITAL | Age: 48
Discharge: HOME | End: 2023-12-30
Attending: EMERGENCY MEDICINE
Payer: MEDICAID

## 2023-12-30 VITALS
HEART RATE: 96 BPM | SYSTOLIC BLOOD PRESSURE: 103 MMHG | WEIGHT: 156 LBS | RESPIRATION RATE: 20 BRPM | OXYGEN SATURATION: 97 % | BODY MASS INDEX: 24.48 KG/M2 | TEMPERATURE: 98 F | HEIGHT: 67 IN | DIASTOLIC BLOOD PRESSURE: 73 MMHG

## 2023-12-30 DIAGNOSIS — R10.32 LLQ ABDOMINAL PAIN: ICD-10-CM

## 2023-12-30 DIAGNOSIS — K92.2 LOWER GI BLEEDING: Primary | ICD-10-CM

## 2023-12-30 LAB
ABO + RH BLD: NORMAL
ALBUMIN SERPL BCP-MCNC: 3.4 G/DL (ref 3.5–5.2)
ALP SERPL-CCNC: 99 U/L (ref 55–135)
ALT SERPL W/O P-5'-P-CCNC: 40 U/L (ref 10–44)
ANION GAP SERPL CALC-SCNC: 12 MMOL/L (ref 8–16)
APTT PPP: 27.9 SEC (ref 21–32)
AST SERPL-CCNC: 30 U/L (ref 10–40)
BASOPHILS # BLD AUTO: 0.02 K/UL (ref 0–0.2)
BASOPHILS NFR BLD: 0.4 % (ref 0–1.9)
BILIRUB SERPL-MCNC: 0.6 MG/DL (ref 0.1–1)
BILIRUB UR QL STRIP: NEGATIVE
BLD GP AB SCN CELLS X3 SERPL QL: NORMAL
BUN SERPL-MCNC: 6 MG/DL (ref 6–20)
CALCIUM SERPL-MCNC: 8.6 MG/DL (ref 8.7–10.5)
CHLORIDE SERPL-SCNC: 102 MMOL/L (ref 95–110)
CLARITY UR: CLEAR
CO2 SERPL-SCNC: 24 MMOL/L (ref 23–29)
COLOR UR: COLORLESS
CREAT SERPL-MCNC: 1.1 MG/DL (ref 0.5–1.4)
DIFFERENTIAL METHOD BLD: ABNORMAL
EOSINOPHIL # BLD AUTO: 0.1 K/UL (ref 0–0.5)
EOSINOPHIL NFR BLD: 1.4 % (ref 0–8)
ERYTHROCYTE [DISTWIDTH] IN BLOOD BY AUTOMATED COUNT: 12.4 % (ref 11.5–14.5)
EST. GFR  (NO RACE VARIABLE): >60 ML/MIN/1.73 M^2
GLUCOSE SERPL-MCNC: 132 MG/DL (ref 70–110)
GLUCOSE UR QL STRIP: NEGATIVE
HCT VFR BLD AUTO: 43.5 % (ref 37–48.5)
HCV AB SERPL QL IA: NEGATIVE
HEP C VIRUS HOLD SPECIMEN: NORMAL
HGB BLD-MCNC: 14.8 G/DL (ref 12–16)
HGB UR QL STRIP: NEGATIVE
HIV 1+2 AB+HIV1 P24 AG SERPL QL IA: NEGATIVE
IMM GRANULOCYTES # BLD AUTO: 0.03 K/UL (ref 0–0.04)
IMM GRANULOCYTES NFR BLD AUTO: 0.5 % (ref 0–0.5)
INR PPP: 1 (ref 0.8–1.2)
KETONES UR QL STRIP: NEGATIVE
LEUKOCYTE ESTERASE UR QL STRIP: NEGATIVE
LIPASE SERPL-CCNC: 55 U/L (ref 4–60)
LYMPHOCYTES # BLD AUTO: 1 K/UL (ref 1–4.8)
LYMPHOCYTES NFR BLD: 17.8 % (ref 18–48)
MCH RBC QN AUTO: 31 PG (ref 27–31)
MCHC RBC AUTO-ENTMCNC: 34 G/DL (ref 32–36)
MCV RBC AUTO: 91 FL (ref 82–98)
MONOCYTES # BLD AUTO: 0.3 K/UL (ref 0.3–1)
MONOCYTES NFR BLD: 4.5 % (ref 4–15)
NEUTROPHILS # BLD AUTO: 4.2 K/UL (ref 1.8–7.7)
NEUTROPHILS NFR BLD: 75.4 % (ref 38–73)
NITRITE UR QL STRIP: NEGATIVE
NRBC BLD-RTO: 0 /100 WBC
PH UR STRIP: 8 [PH] (ref 5–8)
PLATELET # BLD AUTO: 213 K/UL (ref 150–450)
PMV BLD AUTO: 10.1 FL (ref 9.2–12.9)
POTASSIUM SERPL-SCNC: 3.7 MMOL/L (ref 3.5–5.1)
PROT SERPL-MCNC: 6.5 G/DL (ref 6–8.4)
PROT UR QL STRIP: NEGATIVE
PROTHROMBIN TIME: 10.5 SEC (ref 9–12.5)
RBC # BLD AUTO: 4.78 M/UL (ref 4–5.4)
SODIUM SERPL-SCNC: 138 MMOL/L (ref 136–145)
SP GR UR STRIP: <1.005 (ref 1–1.03)
SPECIMEN OUTDATE: NORMAL
URN SPEC COLLECT METH UR: ABNORMAL
UROBILINOGEN UR STRIP-ACNC: NEGATIVE EU/DL
WBC # BLD AUTO: 5.56 K/UL (ref 3.9–12.7)

## 2023-12-30 PROCEDURE — 86803 HEPATITIS C AB TEST: CPT | Performed by: EMERGENCY MEDICINE

## 2023-12-30 PROCEDURE — 80053 COMPREHEN METABOLIC PANEL: CPT | Performed by: NURSE PRACTITIONER

## 2023-12-30 PROCEDURE — 85730 THROMBOPLASTIN TIME PARTIAL: CPT | Performed by: NURSE PRACTITIONER

## 2023-12-30 PROCEDURE — 83690 ASSAY OF LIPASE: CPT | Performed by: NURSE PRACTITIONER

## 2023-12-30 PROCEDURE — 99283 EMERGENCY DEPT VISIT LOW MDM: CPT

## 2023-12-30 PROCEDURE — 86901 BLOOD TYPING SEROLOGIC RH(D): CPT | Performed by: NURSE PRACTITIONER

## 2023-12-30 PROCEDURE — 85610 PROTHROMBIN TIME: CPT | Performed by: NURSE PRACTITIONER

## 2023-12-30 PROCEDURE — 85025 COMPLETE CBC W/AUTO DIFF WBC: CPT | Performed by: NURSE PRACTITIONER

## 2023-12-30 PROCEDURE — 81003 URINALYSIS AUTO W/O SCOPE: CPT | Performed by: NURSE PRACTITIONER

## 2023-12-30 PROCEDURE — 87389 HIV-1 AG W/HIV-1&-2 AB AG IA: CPT | Performed by: EMERGENCY MEDICINE

## 2023-12-30 NOTE — FIRST PROVIDER EVALUATION
Medical screening examination initiated.  I have conducted a focused provider triage encounter, findings are as follows:    Brief history of present illness:  patient presents to ER for rectal bleeding and generalized abdominal pain, onset last night.    There were no vitals filed for this visit.    Pertinent physical exam:  no acute distress    Brief workup plan:  workup    Preliminary workup initiated; this workup will be continued and followed by the physician or advanced practice provider that is assigned to the patient when roomed.

## 2023-12-30 NOTE — ED PROVIDER NOTES
SCRIBE #1 NOTE: I, Robyn Dixon, am scribing for, and in the presence of, Madi Rodriguez MD. I have scribed the entire note.       History     Chief Complaint   Patient presents with    Rectal Bleeding     Rectal bleeding started last night, states she had diarrhea last night, abdominal cramps, and noted blood in stool. Denies taking blood thinners.     HPI  2023, 5:56 PM  History obtained from the patient    HPI:  Jaqueline Gutierrez is a 48 y.o. female with a PMH of GERD and Hashimoto's who presents to the [facility] emergency department for evaluation of rectal bleeding which onset last night. Pt reports BM looks like tissue, and episode in the ED was dark red. Associated symptoms include diarrhea, nausea, and generalized lower abdominal cramping. Pt denies abd cramping before diarrhea. Pt reports sxs onset following drinking Hebo cream, and pt notes abd cramping she is experiencing is similar to cramping she gets following dairy consumption. Pt denies a PMHx of Crohn's disease and Ulcerative Colitis. Pt has not had a colonoscopy performed. No other complaints or concerns.     Arrival mode: Personal vehicle        PCP: [facility], Provider    Review of patient's allergies indicates:   Allergen Reactions    Compazine [prochlorperazine] Other (See Comments)     Panic attack    Pcn [penicillins] Hives      Past Medical History:   Diagnosis Date    Thyroid disease      Past Surgical History:   Procedure Laterality Date     SECTION      HYSTERECTOMY      KNEE SURGERY         No family history on file.  Social History     Tobacco Use    Smoking status: Every Day     Current packs/day: 0.50     Types: Cigarettes    Smokeless tobacco: Never   Substance and Sexual Activity    Alcohol use: Yes     Comment: occasional    Drug use: No    Sexual activity: Not on file      Review of Systems     Review of Systems   Constitutional: Negative.    HENT: Negative.     Eyes: Negative.   "  Respiratory: Negative.     Cardiovascular: Negative.    Gastrointestinal:  Positive for abdominal pain (generalized lower), blood in stool, diarrhea and nausea.   Endocrine: Negative.    Genitourinary: Negative.    Musculoskeletal: Negative.    Skin: Negative.    Allergic/Immunologic: Negative.    Neurological: Negative.    Hematological: Negative.    Psychiatric/Behavioral: Negative.     All other systems reviewed and are negative.         Physical Exam     Initial Vitals [12/30/23 1619]   BP Pulse Resp Temp SpO2   106/72 101 20 97.9 °F (36.6 °C) 98 %      MAP       --          Physical Exam  Nursing notes and vital signs reviewed.  Constitutional: Patient is in no distress.   Head: Normocephalic. Atraumatic.   Eyes: Conjunctivae are not pale. No scleral icterus.   ENT: Mucous membranes moist.   Neck: Supple.   Cardiovascular: Regular rate. Regular rhythm.   Pulmonary: No respiratory distress.   Abdominal: Non-distended. Mild LLQ tenderness.  Rectal: Female chaperone present for the duration of the rectal exam.There is no tenderness. No masses, lesions, or hemorrhoids. Normal sphincter tone.  Musculoskeletal: Moves all extremities. No obvious deformities. No edema.   Skin: Warm and dry.   Neurological:  Alert, awake, and appropriate. Normal speech. No acute lateralizing neurologic deficits appreciated.   Psychiatric: Normal affect.       ED Course   Procedures  Vitals:    12/30/23 1619 12/30/23 1813 12/30/23 1900   BP: 106/72 108/73 103/73   Pulse: 101 99 96   Resp: 20 20 20   Temp: 97.9 °F (36.6 °C)  98 °F (36.7 °C)   TempSrc: Oral  Oral   SpO2: 98% (!) 93% 97%   Weight: 70.8 kg (155 lb 15.6 oz)     Height: 5' 7" (1.702 m)       Lab Results Interpreted as Abnormal:  Labs Reviewed   CBC W/ AUTO DIFFERENTIAL - Abnormal; Notable for the following components:       Result Value    Gran % 75.4 (*)     Lymph % 17.8 (*)     All other components within normal limits    Narrative:     Release to patient->Immediate "   COMPREHENSIVE METABOLIC PANEL - Abnormal; Notable for the following components:    Glucose 132 (*)     Calcium 8.6 (*)     Albumin 3.4 (*)     All other components within normal limits    Narrative:     Release to patient->Immediate   URINALYSIS, REFLEX TO URINE CULTURE - Abnormal; Notable for the following components:    Color, UA Colorless (*)     Specific Gravity, UA <1.005 (*)     All other components within normal limits    Narrative:     Specimen Source->Urine   LIPASE    Narrative:     Release to patient->Immediate   PROTIME-INR    Narrative:     Release to patient->Immediate   APTT    Narrative:     Release to patient->Immediate   HIV 1 / 2 ANTIBODY    Narrative:     Release to patient->Immediate   HEPATITIS C ANTIBODY    Narrative:     Release to patient->Immediate   HEP C VIRUS HOLD SPECIMEN    Narrative:     Release to patient->Immediate   TYPE & SCREEN      All Lab Results:  Results for orders placed or performed during the hospital encounter of 12/30/23   CBC auto differential   Result Value Ref Range    WBC 5.56 3.90 - 12.70 K/uL    RBC 4.78 4.00 - 5.40 M/uL    Hemoglobin 14.8 12.0 - 16.0 g/dL    Hematocrit 43.5 37.0 - 48.5 %    MCV 91 82 - 98 fL    MCH 31.0 27.0 - 31.0 pg    MCHC 34.0 32.0 - 36.0 g/dL    RDW 12.4 11.5 - 14.5 %    Platelets 213 150 - 450 K/uL    MPV 10.1 9.2 - 12.9 fL    Immature Granulocytes 0.5 0.0 - 0.5 %    Gran # (ANC) 4.2 1.8 - 7.7 K/uL    Immature Grans (Abs) 0.03 0.00 - 0.04 K/uL    Lymph # 1.0 1.0 - 4.8 K/uL    Mono # 0.3 0.3 - 1.0 K/uL    Eos # 0.1 0.0 - 0.5 K/uL    Baso # 0.02 0.00 - 0.20 K/uL    nRBC 0 0 /100 WBC    Gran % 75.4 (H) 38.0 - 73.0 %    Lymph % 17.8 (L) 18.0 - 48.0 %    Mono % 4.5 4.0 - 15.0 %    Eosinophil % 1.4 0.0 - 8.0 %    Basophil % 0.4 0.0 - 1.9 %    Differential Method Automated    Comprehensive metabolic panel   Result Value Ref Range    Sodium 138 136 - 145 mmol/L    Potassium 3.7 3.5 - 5.1 mmol/L    Chloride 102 95 - 110 mmol/L    CO2 24 23 - 29  mmol/L    Glucose 132 (H) 70 - 110 mg/dL    BUN 6 6 - 20 mg/dL    Creatinine 1.1 0.5 - 1.4 mg/dL    Calcium 8.6 (L) 8.7 - 10.5 mg/dL    Total Protein 6.5 6.0 - 8.4 g/dL    Albumin 3.4 (L) 3.5 - 5.2 g/dL    Total Bilirubin 0.6 0.1 - 1.0 mg/dL    Alkaline Phosphatase 99 55 - 135 U/L    AST 30 10 - 40 U/L    ALT 40 10 - 44 U/L    eGFR >60 >60 mL/min/1.73 m^2    Anion Gap 12 8 - 16 mmol/L   Lipase   Result Value Ref Range    Lipase 55 4 - 60 U/L   Protime-INR   Result Value Ref Range    Prothrombin Time 10.5 9.0 - 12.5 sec    INR 1.0 0.8 - 1.2   APTT   Result Value Ref Range    aPTT 27.9 21.0 - 32.0 sec   Urinalysis, Reflex to Urine Culture Urine, Clean Catch    Specimen: Urine   Result Value Ref Range    Specimen UA Urine, Clean Catch     Color, UA Colorless (A) Yellow, Straw, Magali    Appearance, UA Clear Clear    pH, UA 8.0 5.0 - 8.0    Specific Gravity, UA <1.005 (A) 1.005 - 1.030    Protein, UA Negative Negative    Glucose, UA Negative Negative    Ketones, UA Negative Negative    Bilirubin (UA) Negative Negative    Occult Blood UA Negative Negative    Nitrite, UA Negative Negative    Urobilinogen, UA Negative <2.0 EU/dL    Leukocytes, UA Negative Negative   HIV 1/2 Ag/Ab (4th Gen)   Result Value Ref Range    HIV 1/2 Ag/Ab Negative Negative   Hepatitis C Antibody   Result Value Ref Range    Hepatitis C Ab Negative Negative   HCV Virus Hold Specimen   Result Value Ref Range    HEP C Virus Hold Specimen Hold for HCV sendout    Type & Screen   Result Value Ref Range    Group & Rh B NEG     Indirect Lena NEG     Specimen Outdate 01/02/2024 23:59      Imaging Results    None            The emergency physician reviewed the vital signs and test results, which are outlined above.     ED Discussion     7:21 PM: Patient's evaluation in the ED does not suggest any emergent or life-threatening medical conditions requiring immediate intervention beyond what was provided in the ED, and I believe patient is safe for discharge.   Regardless, an unremarkable evaluation in the ED does not preclude the development or presence of a serious or life-threatening condition. As such, patient was given return instructions for any change or worsening of symptoms.             ED Medication(s) Administered:  Medications - No data to display    Prescription Management: I performed a review of the patient's current Rx medication list as input by nursing staff.    Discharge Medication List as of 12/30/2023  7:18 PM        CONTINUE these medications which have NOT CHANGED    Details   levothyroxine (SYNTHROID, LEVOTHROID) 175 MCG tablet Take 1 tablet (175 mcg total) by mouth once daily., Starting Wed 11/20/2019, Until Thu 11/19/2020, Print           STOP taking these medications       docusate sodium (COLACE) 100 MG capsule Comments:   Reason for Stopping:                   Follow-up Information       Neal Styles MD. Schedule an appointment as soon as possible for a visit on 1/2/2024.    Specialties: Transplant, Hepatology, Gastroenterology  Contact information:  [address]  [place] [address] [phone]               Schedule an appointment as soon as possible for a visit  with your PCP.               [facility] - Emergency Dept..    Specialty: Emergency Medicine  Why: As needed, If symptoms worsen  Contact information:  [address] [facility] [address] [phone]                           Scribe Attestation:   Scribe #1: I performed the above scribed service and the documentation accurately describes the services I performed. I attest to the accuracy of the note.     Attending:   Physician Attestation Statement for Scribe #1: I, Madi Rodriguez MD, personally performed the services described in this documentation, as scribed by Robyn Dixon, in my presence, and it is both accurate and complete. As with other dictation methods such as dictation software, small errors or inconsistencies may be  overlooked due to the goal of spending more face-to-face time with patients.      Clinical Impression       ICD-10-CM ICD-9-CM   1. Lower GI bleeding  K92.2 578.9   2. LLQ abdominal pain  R10.32 789.04      ED Disposition Condition    Discharge Stable               Madi Rodriguez MD  12/31/23 [id number]

## 2024-01-20 ENCOUNTER — HOSPITAL ENCOUNTER (INPATIENT)
Facility: HOSPITAL | Age: 49
LOS: 3 days | Discharge: HOME | DRG: 287 | End: 2024-01-23
Attending: EMERGENCY MEDICINE | Admitting: HOSPITALIST
Payer: MEDICAID

## 2024-01-20 DIAGNOSIS — I44.7 LEFT BUNDLE BRANCH BLOCK: ICD-10-CM

## 2024-01-20 DIAGNOSIS — I50.9 ACUTE CHF (CONGESTIVE HEART FAILURE): ICD-10-CM

## 2024-01-20 DIAGNOSIS — M79.661 RIGHT CALF PAIN: ICD-10-CM

## 2024-01-20 DIAGNOSIS — I82.819 VENOUS EMBOLISM AND THROMBOSIS OF SUPERFICIAL VESSELS OF LOWER EXTREMITY, UNSPECIFIED LATERALITY: ICD-10-CM

## 2024-01-20 DIAGNOSIS — R79.89 TROPONIN LEVEL ELEVATED: ICD-10-CM

## 2024-01-20 DIAGNOSIS — R06.02 SHORTNESS OF BREATH: ICD-10-CM

## 2024-01-20 DIAGNOSIS — I50.9 ACUTE CONGESTIVE HEART FAILURE, UNSPECIFIED HEART FAILURE TYPE: Primary | ICD-10-CM

## 2024-01-20 DIAGNOSIS — R79.89 ELEVATED TROPONIN: ICD-10-CM

## 2024-01-20 DIAGNOSIS — I50.9 NEW ONSET OF CONGESTIVE HEART FAILURE: ICD-10-CM

## 2024-01-20 LAB
ALBUMIN SERPL BCP-MCNC: 3.4 G/DL (ref 3.5–5.2)
ALP SERPL-CCNC: 106 U/L (ref 55–135)
ALT SERPL W/O P-5'-P-CCNC: 61 U/L (ref 10–44)
ANION GAP SERPL CALC-SCNC: 10 MMOL/L (ref 8–16)
AST SERPL-CCNC: 52 U/L (ref 10–40)
BASOPHILS # BLD AUTO: 0.02 K/UL (ref 0–0.2)
BASOPHILS NFR BLD: 0.4 % (ref 0–1.9)
BILIRUB SERPL-MCNC: 0.5 MG/DL (ref 0.1–1)
BNP SERPL-MCNC: 3195 PG/ML (ref 0–99)
BUN SERPL-MCNC: 12 MG/DL (ref 6–20)
CALCIUM SERPL-MCNC: 8.7 MG/DL (ref 8.7–10.5)
CHLORIDE SERPL-SCNC: 106 MMOL/L (ref 95–110)
CO2 SERPL-SCNC: 23 MMOL/L (ref 23–29)
CREAT SERPL-MCNC: 1.1 MG/DL (ref 0.5–1.4)
DIFFERENTIAL METHOD BLD: ABNORMAL
EOSINOPHIL # BLD AUTO: 0.1 K/UL (ref 0–0.5)
EOSINOPHIL NFR BLD: 1.3 % (ref 0–8)
ERYTHROCYTE [DISTWIDTH] IN BLOOD BY AUTOMATED COUNT: 13 % (ref 11.5–14.5)
EST. GFR  (NO RACE VARIABLE): >60 ML/MIN/1.73 M^2
GLUCOSE SERPL-MCNC: 116 MG/DL (ref 70–110)
HCT VFR BLD AUTO: 35 % (ref 37–48.5)
HGB BLD-MCNC: 12 G/DL (ref 12–16)
IMM GRANULOCYTES # BLD AUTO: 0.03 K/UL (ref 0–0.04)
IMM GRANULOCYTES NFR BLD AUTO: 0.6 % (ref 0–0.5)
INFLUENZA A, MOLECULAR: NEGATIVE
INFLUENZA B, MOLECULAR: NEGATIVE
LACTATE SERPL-SCNC: 0.9 MMOL/L (ref 0.5–2.2)
LYMPHOCYTES # BLD AUTO: 1 K/UL (ref 1–4.8)
LYMPHOCYTES NFR BLD: 17.9 % (ref 18–48)
MAGNESIUM SERPL-MCNC: 1.6 MG/DL (ref 1.6–2.6)
MCH RBC QN AUTO: 31.1 PG (ref 27–31)
MCHC RBC AUTO-ENTMCNC: 34.3 G/DL (ref 32–36)
MCV RBC AUTO: 91 FL (ref 82–98)
MONOCYTES # BLD AUTO: 0.3 K/UL (ref 0.3–1)
MONOCYTES NFR BLD: 5.3 % (ref 4–15)
NEUTROPHILS # BLD AUTO: 4 K/UL (ref 1.8–7.7)
NEUTROPHILS NFR BLD: 74.5 % (ref 38–73)
NRBC BLD-RTO: 0 /100 WBC
PLATELET # BLD AUTO: 232 K/UL (ref 150–450)
PMV BLD AUTO: 10.1 FL (ref 9.2–12.9)
POTASSIUM SERPL-SCNC: 3.8 MMOL/L (ref 3.5–5.1)
PROT SERPL-MCNC: 6.2 G/DL (ref 6–8.4)
RBC # BLD AUTO: 3.86 M/UL (ref 4–5.4)
SARS-COV-2 RDRP RESP QL NAA+PROBE: NEGATIVE
SODIUM SERPL-SCNC: 139 MMOL/L (ref 136–145)
SPECIMEN SOURCE: NORMAL
TROPONIN I SERPL DL<=0.01 NG/ML-MCNC: 1.51 NG/ML (ref 0–0.03)
TROPONIN I SERPL DL<=0.01 NG/ML-MCNC: 1.55 NG/ML (ref 0–0.03)
TROPONIN I SERPL DL<=0.01 NG/ML-MCNC: 1.61 NG/ML (ref 0–0.03)
WBC # BLD AUTO: 5.31 K/UL (ref 3.9–12.7)

## 2024-01-20 PROCEDURE — 83735 ASSAY OF MAGNESIUM: CPT | Performed by: NURSE PRACTITIONER

## 2024-01-20 PROCEDURE — 99285 EMERGENCY DEPT VISIT HI MDM: CPT | Mod: 25

## 2024-01-20 PROCEDURE — 80053 COMPREHEN METABOLIC PANEL: CPT | Performed by: NURSE PRACTITIONER

## 2024-01-20 PROCEDURE — 96374 THER/PROPH/DIAG INJ IV PUSH: CPT

## 2024-01-20 PROCEDURE — 36415 COLL VENOUS BLD VENIPUNCTURE: CPT | Performed by: NURSE PRACTITIONER

## 2024-01-20 PROCEDURE — 25000003 PHARM REV CODE 250: Performed by: NURSE PRACTITIONER

## 2024-01-20 PROCEDURE — 84484 ASSAY OF TROPONIN QUANT: CPT | Mod: 91 | Performed by: NURSE PRACTITIONER

## 2024-01-20 PROCEDURE — 83880 ASSAY OF NATRIURETIC PEPTIDE: CPT | Performed by: NURSE PRACTITIONER

## 2024-01-20 PROCEDURE — 63600175 PHARM REV CODE 636 W HCPCS: Performed by: NURSE PRACTITIONER

## 2024-01-20 PROCEDURE — 93005 ELECTROCARDIOGRAM TRACING: CPT

## 2024-01-20 PROCEDURE — 83036 HEMOGLOBIN GLYCOSYLATED A1C: CPT | Performed by: NURSE PRACTITIONER

## 2024-01-20 PROCEDURE — 87502 INFLUENZA DNA AMP PROBE: CPT | Performed by: NURSE PRACTITIONER

## 2024-01-20 PROCEDURE — 93010 ELECTROCARDIOGRAM REPORT: CPT | Mod: ,,, | Performed by: INTERNAL MEDICINE

## 2024-01-20 PROCEDURE — U0002 COVID-19 LAB TEST NON-CDC: HCPCS | Performed by: NURSE PRACTITIONER

## 2024-01-20 PROCEDURE — 21400001 HC TELEMETRY ROOM

## 2024-01-20 PROCEDURE — 25000003 PHARM REV CODE 250: Performed by: EMERGENCY MEDICINE

## 2024-01-20 PROCEDURE — 63600175 PHARM REV CODE 636 W HCPCS: Performed by: EMERGENCY MEDICINE

## 2024-01-20 PROCEDURE — 83605 ASSAY OF LACTIC ACID: CPT | Performed by: EMERGENCY MEDICINE

## 2024-01-20 PROCEDURE — 11000001 HC ACUTE MED/SURG PRIVATE ROOM

## 2024-01-20 PROCEDURE — 85025 COMPLETE CBC W/AUTO DIFF WBC: CPT | Performed by: NURSE PRACTITIONER

## 2024-01-20 PROCEDURE — 84484 ASSAY OF TROPONIN QUANT: CPT | Performed by: NURSE PRACTITIONER

## 2024-01-20 RX ORDER — ASPIRIN 325 MG
325 TABLET ORAL
Status: COMPLETED | OUTPATIENT
Start: 2024-01-20 | End: 2024-01-20

## 2024-01-20 RX ORDER — OMEPRAZOLE 20 MG/1
20 CAPSULE, DELAYED RELEASE ORAL DAILY
COMMUNITY
Start: 2024-01-17 | End: 2024-04-22

## 2024-01-20 RX ORDER — CETIRIZINE HYDROCHLORIDE 10 MG/1
1 TABLET ORAL EVERY MORNING
COMMUNITY
Start: 2024-01-17 | End: 2025-01-16

## 2024-01-20 RX ORDER — ALBUTEROL SULFATE 90 UG/1
2 AEROSOL, METERED RESPIRATORY (INHALATION) EVERY 4 HOURS PRN
COMMUNITY
Start: 2024-01-05 | End: 2024-04-22

## 2024-01-20 RX ORDER — ONDANSETRON 4 MG/1
4 TABLET, ORALLY DISINTEGRATING ORAL EVERY 6 HOURS PRN
Status: DISCONTINUED | OUTPATIENT
Start: 2024-01-20 | End: 2024-01-23 | Stop reason: HOSPADM

## 2024-01-20 RX ORDER — FLUTICASONE PROPIONATE 50 MCG
1 SPRAY, SUSPENSION (ML) NASAL EVERY MORNING
COMMUNITY
Start: 2024-01-17 | End: 2024-04-22

## 2024-01-20 RX ORDER — ASPIRIN 81 MG/1
81 TABLET ORAL DAILY
Status: DISCONTINUED | OUTPATIENT
Start: 2024-01-21 | End: 2024-01-23 | Stop reason: HOSPADM

## 2024-01-20 RX ORDER — LORAZEPAM 2 MG/ML
1 INJECTION INTRAMUSCULAR
Status: COMPLETED | OUTPATIENT
Start: 2024-01-20 | End: 2024-01-20

## 2024-01-20 RX ORDER — ACETAMINOPHEN 325 MG/1
650 TABLET ORAL EVERY 6 HOURS PRN
Status: DISCONTINUED | OUTPATIENT
Start: 2024-01-20 | End: 2024-01-23

## 2024-01-20 RX ORDER — ENOXAPARIN SODIUM 100 MG/ML
40 INJECTION SUBCUTANEOUS EVERY 24 HOURS
Status: DISCONTINUED | OUTPATIENT
Start: 2024-01-20 | End: 2024-01-21

## 2024-01-20 RX ORDER — SODIUM CHLORIDE 0.9 % (FLUSH) 0.9 %
10 SYRINGE (ML) INJECTION
Status: DISCONTINUED | OUTPATIENT
Start: 2024-01-20 | End: 2024-01-23 | Stop reason: HOSPADM

## 2024-01-20 RX ORDER — FUROSEMIDE 10 MG/ML
60 INJECTION INTRAMUSCULAR; INTRAVENOUS
Status: COMPLETED | OUTPATIENT
Start: 2024-01-20 | End: 2024-01-20

## 2024-01-20 RX ORDER — FUROSEMIDE 10 MG/ML
20 INJECTION INTRAMUSCULAR; INTRAVENOUS DAILY
Status: DISCONTINUED | OUTPATIENT
Start: 2024-01-21 | End: 2024-01-21

## 2024-01-20 RX ADMIN — FUROSEMIDE 60 MG: 10 INJECTION, SOLUTION INTRAMUSCULAR; INTRAVENOUS at 06:01

## 2024-01-20 RX ADMIN — LORAZEPAM 1 MG: 2 INJECTION INTRAMUSCULAR; INTRAVENOUS at 08:01

## 2024-01-20 RX ADMIN — ENOXAPARIN SODIUM 40 MG: 40 INJECTION SUBCUTANEOUS at 08:01

## 2024-01-20 RX ADMIN — ACETAMINOPHEN 650 MG: 325 TABLET ORAL at 11:01

## 2024-01-20 RX ADMIN — ASPIRIN 325 MG ORAL TABLET 325 MG: 325 PILL ORAL at 06:01

## 2024-01-20 NOTE — Clinical Note
The PA catheter was inserted into the pulmonary wedge. Hemodynamics were performed. [place] wire used to obtain access

## 2024-01-20 NOTE — Clinical Note
The catheter was inserted into the left ventricle. Hemodynamics were performed.  and Pullback was recorded.  The angiography was performed via power injection. The injected amount was 30 mL contrast at 12 mL/s.  Catheter removed.

## 2024-01-20 NOTE — Clinical Note
Spoke with patient's primary nurse about clarifying if the patient is menopausal. Primary nurse stated that he would assess and let me know if a pregnancy test is needed.

## 2024-01-20 NOTE — ED PROVIDER NOTES
SCRIBE #1 NOTE: I, Robyn Dixon, am scribing for, and in the presence of, Pacheoc Neri Jr., MD. I have scribed the entire note.       History     Chief Complaint   Patient presents with    Shortness of Breath     Shortness of breath and getting winded upon exertion x 4 days ago. Dx with URI at urgent care a week ago. Muscle cramping.      Review of patient's allergies indicates:   Allergen Reactions    Compazine [prochlorperazine] Other (See Comments)     Panic attack    Pcn [penicillins] Hives         History of Present Illness     HPI    2024, 5:55 PM  History obtained from the patient      History of Present Illness: Jaqueline Gutierrez is a 48 y.o. female patient who presents to the Emergency Department for evaluation of SOB which onset 4 days PTA. Symptoms are constant and moderate in severity. Sxs are exacerbated by physical exertion. Associated sxs include cough and muscle cramping in RLE. Pt reports she cannot put pressure on right leg. Patient denies any wheezing, fever, and  chills. Pt denies CP but notes when she takes a breath she feels pressure against her rib region. Pt reports tobacco use. Pt denies PMHx of HTN and HLD. No further complaints or concerns at this time.       Arrival mode: Personal vehicle      PCP: Jennifer Carter        Past Medical History:  Past Medical History:   Diagnosis Date    Thyroid disease        Past Surgical History:  Past Surgical History:   Procedure Laterality Date     SECTION      HYSTERECTOMY      KNEE SURGERY           Family History:  No family history on file.    Social History:  Social History     Tobacco Use    Smoking status: Every Day     Current packs/day: 0.50     Types: Cigarettes    Smokeless tobacco: Never   Substance and Sexual Activity    Alcohol use: Yes     Comment: occasional    Drug use: No    Sexual activity: Not on file        Review of Systems     Review of Systems   Constitutional: Negative.  Negative for chills and  fever.   HENT: Negative.     Eyes: Negative.    Respiratory:  Positive for cough and shortness of breath. Negative for wheezing.    Cardiovascular: Negative.  Negative for chest pain.   Gastrointestinal: Negative.    Endocrine: Negative.    Genitourinary: Negative.    Musculoskeletal:  Positive for myalgias (RLE).   Skin: Negative.    Allergic/Immunologic: Negative.    Neurological: Negative.    Hematological: Negative.    Psychiatric/Behavioral: Negative.          Physical Exam     Initial Vitals [01/20/24 1559]   BP Pulse Resp Temp SpO2   110/74 106 20 97.6 °F (36.4 °C) 98 %      MAP       --          Physical Exam  Nursing Notes and Vital Signs Reviewed.  Constitutional: Patient is in no acute distress. Well-developed and well-nourished.  Head: Atraumatic. Normocephalic.  Eyes:  EOM intact.  No scleral icterus.  ENT: Mucous membranes are moist.  Nares clear   Neck:  Full ROM. No JVD.  Cardiovascular: Regular rate. Regular rhythm No murmurs, rubs, or gallops. Distal pulses are 2+ and symmetric  Pulmonary/Chest: No respiratory distress. Clear to auscultation bilaterally. No wheezing or rales.  Equal chest wall rise bilaterally  Abdominal: Soft and non-distended.  There is no tenderness.  No rebound, guarding, or rigidity. Good bowel sounds.  Genitourinary: No CVA tenderness.  No suprapubic tenderness  Musculoskeletal: Moves all extremities. No obvious deformities.  5 x 5 strength in all extremities   Skin: Warm and dry.  Neurological:  Alert, awake, and appropriate.  Normal speech.  No acute focal neurological deficits are appreciated.  Two through 12 intact bilaterally.  Psychiatric: Normal affect. Good eye contact. Appropriate in content.     ED Course   Critical Care    Date/Time: 1/20/2024 6:06 PM    Performed by: Pacheco Neri Jr., MD  Authorized by: Pacheco Neri Jr., MD  Direct patient critical care time: 20 minutes  Additional history critical care time: 10 minutes  Ordering / reviewing critical  "care time: 10 minutes  Documentation critical care time: 10 minutes  Consulting other physicians critical care time: 10 minutes  Total critical care time (exclusive of procedural time) : 60 minutes  Critical care time was exclusive of separately billable procedures and treating other patients and teaching time.  Critical care was necessary to treat or prevent imminent or life-threatening deterioration of the following conditions: cardiac failure.  Critical care was time spent personally by me on the following activities: blood draw for specimens, development of treatment plan with patient or surrogate, discussions with consultants, interpretation of cardiac output measurements, evaluation of patient's response to treatment, examination of patient, obtaining history from patient or surrogate, ordering and performing treatments and interventions, ordering and review of laboratory studies, ordering and review of radiographic studies, pulse oximetry, re-evaluation of patient's condition and review of old charts.        ED Vital Signs:  Vitals:    01/20/24 1559 01/20/24 1757 01/20/24 1759 01/20/24 1815   BP: 110/74 113/74  110/76   Pulse: 106 99 97 108   Resp: 20 (!) 22  20   Temp: 97.6 °F (36.4 °C)      TempSrc: Oral      SpO2: 98% 98%  97%   Weight: 68 kg (150 lb)      Height: 5' 7" (1.702 m)       01/20/24 1911   BP: 117/89   Pulse: 104   Resp: (!) 32   Temp:    TempSrc:    SpO2: 98%   Weight:    Height:        Abnormal Lab Results:  Labs Reviewed   CBC W/ AUTO DIFFERENTIAL - Abnormal; Notable for the following components:       Result Value    RBC 3.86 (*)     Hematocrit 35.0 (*)     MCH 31.1 (*)     Immature Granulocytes 0.6 (*)     Gran % 74.5 (*)     Lymph % 17.9 (*)     All other components within normal limits   COMPREHENSIVE METABOLIC PANEL - Abnormal; Notable for the following components:    Glucose 116 (*)     Albumin 3.4 (*)     AST 52 (*)     ALT 61 (*)     All other components within normal limits "   TROPONIN I - Abnormal; Notable for the following components:    Troponin I 1.551 (*)     All other components within normal limits   B-TYPE NATRIURETIC PEPTIDE - Abnormal; Notable for the following components:    BNP 3,195 (*)     All other components within normal limits   INFLUENZA A & B BY MOLECULAR   SARS-COV-2 RNA AMPLIFICATION, QUAL   LACTIC ACID, PLASMA   HEMOGLOBIN A1C   MAGNESIUM   TROPONIN I        All Lab Results:  Results for orders placed or performed during the hospital encounter of 01/20/24   Influenza A & B by Molecular    Specimen: Nasopharyngeal Swab   Result Value Ref Range    Influenza A, Molecular Negative Negative    Influenza B, Molecular Negative Negative    Flu A & B Source Nasal swab    CBC Auto Differential   Result Value Ref Range    WBC 5.31 3.90 - 12.70 K/uL    RBC 3.86 (L) 4.00 - 5.40 M/uL    Hemoglobin 12.0 12.0 - 16.0 g/dL    Hematocrit 35.0 (L) 37.0 - 48.5 %    MCV 91 82 - 98 fL    MCH 31.1 (H) 27.0 - 31.0 pg    MCHC 34.3 32.0 - 36.0 g/dL    RDW 13.0 11.5 - 14.5 %    Platelets 232 150 - 450 K/uL    MPV 10.1 9.2 - 12.9 fL    Immature Granulocytes 0.6 (H) 0.0 - 0.5 %    Gran # (ANC) 4.0 1.8 - 7.7 K/uL    Immature Grans (Abs) 0.03 0.00 - 0.04 K/uL    Lymph # 1.0 1.0 - 4.8 K/uL    Mono # 0.3 0.3 - 1.0 K/uL    Eos # 0.1 0.0 - 0.5 K/uL    Baso # 0.02 0.00 - 0.20 K/uL    nRBC 0 0 /100 WBC    Gran % 74.5 (H) 38.0 - 73.0 %    Lymph % 17.9 (L) 18.0 - 48.0 %    Mono % 5.3 4.0 - 15.0 %    Eosinophil % 1.3 0.0 - 8.0 %    Basophil % 0.4 0.0 - 1.9 %    Differential Method Automated    Comprehensive Metabolic Panel   Result Value Ref Range    Sodium 139 136 - 145 mmol/L    Potassium 3.8 3.5 - 5.1 mmol/L    Chloride 106 95 - 110 mmol/L    CO2 23 23 - 29 mmol/L    Glucose 116 (H) 70 - 110 mg/dL    BUN 12 6 - 20 mg/dL    Creatinine 1.1 0.5 - 1.4 mg/dL    Calcium 8.7 8.7 - 10.5 mg/dL    Total Protein 6.2 6.0 - 8.4 g/dL    Albumin 3.4 (L) 3.5 - 5.2 g/dL    Total Bilirubin 0.5 0.1 - 1.0 mg/dL     Alkaline Phosphatase 106 55 - 135 U/L    AST 52 (H) 10 - 40 U/L    ALT 61 (H) 10 - 44 U/L    eGFR >60 >60 mL/min/1.73 m^2    Anion Gap 10 8 - 16 mmol/L   COVID-19 Rapid Screening   Result Value Ref Range    SARS-CoV-2 RNA, Amplification, Qual Negative Negative   Troponin I   Result Value Ref Range    Troponin I 1.551 (H) 0.000 - 0.026 ng/mL   Brain natriuretic peptide   Result Value Ref Range    BNP 3,195 (H) 0 - 99 pg/mL   Lactic acid, plasma   Result Value Ref Range    Lactate (Lactic Acid) 0.9 0.5 - 2.2 mmol/L         Imaging Results:  Imaging Results              US Lower Extremity Veins Right (Final result)  Result time 01/20/24 19:03:08      Final result by Richy Almonte MD (01/20/24 19:03:08)                   Impression:      Positive superficial venous thrombosis within the right superficial saphenous vein.  Negative deep venous thrombosis.  Reported to Dr. Neri at 19:00.      Electronically signed by: Richy Almonte  Date:    01/20/2024  Time:    19:03               Narrative:    EXAMINATION:  US LOWER EXTREMITY VEINS RIGHT    CLINICAL HISTORY:  Pain in right lower leg    TECHNIQUE:  Duplex and color flow Doppler evaluation and graded compression of the right lower extremity veins was performed.    COMPARISON:  None    FINDINGS:  Right thigh veins: The common femoral, femoral, popliteal, upper greater saphenous, and deep femoral veins are patent and free of thrombus. The veins are normally compressible and have normal phasic flow and augmentation response.    Right calf veins: The visualized calf veins are patent.    Contralateral CFV: The contralateral (left) common femoral vein is patent and free of thrombus.    Miscellaneous: Positive superficial venous thrombosis within the right superficial saphenous vein. Negative deep venous thrombosis. Reported to Dr. Meek at 19:00.                                       X-Ray Chest 1 View (Final result)  Result time 01/20/24 16:24:50      Final result by  Richy Almonte MD (01/20/24 16:24:50)                   Impression:      As above      Electronically signed by: Richy Almonte  Date:    01/20/2024  Time:    16:24               Narrative:    EXAMINATION:  XR CHEST 1 VIEW    CLINICAL HISTORY:  shortness of breath;    TECHNIQUE:  Single frontal view of the chest was performed.    COMPARISON:  None    FINDINGS:  Cardiomegaly with perihilar edema.  Correlate clinically for CHF.    Bones are intact.  Nipple markers are seen.                                       The EKG was ordered, reviewed, and independently interpreted by the ED provider.  Interpretation time: 16:02  Rate: 101 BPM  Rhythm: Sinus tachycardia  Interpretation:  Left bundle branch block Abnormal ECG. No STEMI.  When compared with ECG of 31-MAY-2010 13:19, Vent. rate has increased BY 39 BPM and Left bundle branch block is now Present.           The Emergency Provider reviewed the vital signs and test results, which are outlined above.     ED Discussion     7:05 PM: Discussed pt's case with Dr. Gerri Mcnulty MD (Cardiology) who agrees with plan to  admit for diuresis and further workup.    7:16 PM: Discussed case with Jorge Mcconnell NP (Hospital Medicine). Dr. Isaac Mcconnell MD agrees with current care and management of pt and accepts admission.   Admitting Service: Hospital Medicine  Admitting Physician: Dr. Mcconnell  Admit to: Med/tele inpatient    7:19 PM: Re-evaluated pt. I have discussed test results, shared treatment plan, and the need for admission with patient and family at bedside. Pt and family express understanding at this time and agree with all information. All questions answered. Pt and family have no further questions or concerns at this time. Pt is ready for admit.       Medical Decision Making  Differential diagnosis:  Flu, COVID, pneumonia, CHF, pulmonary edema    48-year-old white female history of tobacco abuse presents complaining of shortness a breath worsening over the  past several days.  He was previously diagnosed with a viral syndrome.  Workup was initiated with the EKG showing a new onset left bundle-branch block person old.  Blood work was obtained showing normal lactic acid but an elevated BNP at 3200 and a troponin of 1.56.  Patient had minimal elevation in her LFTs her CMP was relatively benign.  She would hemoglobin 12.  Flu and COVID were both negative and her chest x-ray showed what appeared to be pulmonary edema.  She was complaining of having some calf pain as well on ultrasound was performed showing a superficial venous thrombus right saphenous vein.  No DVT.  I discussed the case with both Cardiology and Hospital Medicine graciously accepts the patient for admission.    Amount and/or Complexity of Data Reviewed  External Data Reviewed: labs, ECG and notes.  Labs: ordered. Decision-making details documented in ED Course.  Radiology: ordered. Decision-making details documented in ED Course.  ECG/medicine tests: ordered and independent interpretation performed. Decision-making details documented in ED Course.  Discussion of management or test interpretation with external provider(s): Discussed the case with both Cardiology and Hospital Medicine graciously accepts the patient for admission    Risk  OTC drugs.  Prescription drug management.  Parenteral controlled substances.  Decision regarding hospitalization.    Critical Care  Total time providing critical care: 60 minutes                ED Medication(s):  Medications   sodium chloride 0.9% flush 10 mL (has no administration in time range)   enoxaparin injection 40 mg (has no administration in time range)   furosemide injection 20 mg (has no administration in time range)   ondansetron disintegrating tablet 4 mg (has no administration in time range)   aspirin EC tablet 81 mg (has no administration in time range)   acetaminophen tablet 650 mg (has no administration in time range)   LORazepam injection 1 mg (has no  administration in time range)   aspirin tablet 325 mg (325 mg Oral Given 1/20/24 1816)   furosemide injection 60 mg (60 mg Intravenous Given 1/20/24 1820)       New Prescriptions    No medications on file               Scribe Attestation:   Scribe #1: I performed the above scribed service and the documentation accurately describes the services I performed. I attest to the accuracy of the note.     Attending:   Physician Attestation Statement for Scribe #1: I, Pacheco Neri Jr., MD, personally performed the services described in this documentation, as scribed by Robyn Dixon, in my presence, and it is both accurate and complete.           Clinical Impression       ICD-10-CM ICD-9-CM   1. Acute congestive heart failure, unspecified heart failure type  I50.9 428.0   2. Shortness of breath  R06.02 786.05   3. Right calf pain  M79.661 729.5   4. Troponin level elevated  R79.89 790.6   5. Left bundle branch block  I44.7 426.3   6. Venous embolism and thrombosis of superficial vessels of lower extremity, unspecified laterality  I82.819 453.6   7. New onset of congestive heart failure  I50.9 428.0       Disposition:   Disposition: Admitted  Condition: Serious         Pacheco Neri Jr., MD  01/20/24 1953       Pacheco Neri Jr., MD  01/20/24 1954

## 2024-01-20 NOTE — Clinical Note
The PA catheter was repositioned to the right atrium. Hemodynamics were performed. [place] catheter removed.

## 2024-01-20 NOTE — FIRST PROVIDER EVALUATION
Medical screening examination initiated.  I have conducted a focused provider triage encounter, findings are as follows:    Brief history of present illness:  sob, worse on exertion     There were no vitals filed for this visit.    Pertinent physical exam:  nad    Brief workup plan:  labs, EKG imaging, further eval    Preliminary workup initiated; this workup will be continued and followed by the physician or advanced practice provider that is assigned to the patient when roomed.

## 2024-01-20 NOTE — Clinical Note
The catheter was inserted into the ostium   left main. An angiography was performed of the left coronary arteries. Multiple views were taken. Guide wire removed, angiography performed and catheter removed.

## 2024-01-20 NOTE — Clinical Note
The PA catheter was repositioned to the left pulmonary artery. Hemodynamics were performed. Cardiac output was obtained. 3.24

## 2024-01-21 PROBLEM — I82.819 VENOUS EMBOLISM AND THROMBOSIS OF SUPERFICIAL VESSELS OF LOWER EXTREMITY: Status: ACTIVE | Noted: 2024-01-21

## 2024-01-21 PROBLEM — I44.7 LBBB (LEFT BUNDLE BRANCH BLOCK): Status: ACTIVE | Noted: 2024-01-21

## 2024-01-21 PROBLEM — F19.10 SUBSTANCE ABUSE: Status: ACTIVE | Noted: 2024-01-21

## 2024-01-21 PROBLEM — I50.9 NEW ONSET OF CONGESTIVE HEART FAILURE: Status: ACTIVE | Noted: 2024-01-21

## 2024-01-21 PROBLEM — R79.89 ELEVATED TROPONIN: Status: ACTIVE | Noted: 2024-01-21

## 2024-01-21 PROBLEM — Z72.0 TOBACCO USE: Status: ACTIVE | Noted: 2024-01-21

## 2024-01-21 LAB
AMPHET+METHAMPHET UR QL: ABNORMAL
ANION GAP SERPL CALC-SCNC: 12 MMOL/L (ref 8–16)
AORTIC ROOT ANNULUS: 2.89 CM
APTT PPP: 25.2 SEC (ref 21–32)
APTT PPP: 33.1 SEC (ref 21–32)
ASCENDING AORTA: 2.87 CM
AV INDEX (PROSTH): 0.56
AV MEAN GRADIENT: 5 MMHG
AV PEAK GRADIENT: 7 MMHG
AV VALVE AREA BY VELOCITY RATIO: 1.78 CM²
AV VALVE AREA: 1.71 CM²
AV VELOCITY RATIO: 0.58
BARBITURATES UR QL SCN>200 NG/ML: NEGATIVE
BENZODIAZ UR QL SCN>200 NG/ML: NEGATIVE
BSA FOR ECHO PROCEDURE: 1.8 M2
BUN SERPL-MCNC: 10 MG/DL (ref 6–20)
BZE UR QL SCN: NEGATIVE
CALCIUM SERPL-MCNC: 8.9 MG/DL (ref 8.7–10.5)
CANNABINOIDS UR QL SCN: ABNORMAL
CHLORIDE SERPL-SCNC: 101 MMOL/L (ref 95–110)
CO2 SERPL-SCNC: 27 MMOL/L (ref 23–29)
CREAT SERPL-MCNC: 1.1 MG/DL (ref 0.5–1.4)
CREAT UR-MCNC: 57.9 MG/DL (ref 15–325)
CV ECHO LV RWT: 0.36 CM
D DIMER PPP IA.FEU-MCNC: 2.78 MG/L FEU
DOP CALC AO PEAK VEL: 1.35 M/S
DOP CALC AO VTI: 19.6 CM
DOP CALC LVOT AREA: 3.1 CM2
DOP CALC LVOT DIAMETER: 1.98 CM
DOP CALC LVOT PEAK VEL: 0.78 M/S
DOP CALC LVOT STROKE VOLUME: 33.54 CM3
DOP CALC RVOT PEAK VEL: 0.61 M/S
DOP CALC RVOT VTI: 9 CM
DOP CALCLVOT PEAK VEL VTI: 10.9 CM
E/E' RATIO: 12.52 M/S
ECHO LV POSTERIOR WALL: 1.25 CM (ref 0.6–1.1)
EJECTION FRACTION: 15 %
EST. GFR  (NO RACE VARIABLE): >60 ML/MIN/1.73 M^2
ESTIMATED AVG GLUCOSE: 117 MG/DL (ref 68–131)
FRACTIONAL SHORTENING: 5 % (ref 28–44)
GLUCOSE SERPL-MCNC: 88 MG/DL (ref 70–110)
HBA1C MFR BLD: 5.7 % (ref 4–5.6)
INTERVENTRICULAR SEPTUM: 1.2 CM (ref 0.6–1.1)
IVC DIAMETER: 1.42 CM
IVRT: 38.06 MSEC
LA MAJOR: 5.92 CM
LA MINOR: 5.9 CM
LA WIDTH: 4.1 CM
LEFT ATRIUM SIZE: 4.71 CM
LEFT ATRIUM VOLUME INDEX: 54.2 ML/M2
LEFT ATRIUM VOLUME: 97.01 CM3
LEFT INTERNAL DIMENSION IN SYSTOLE: 6.62 CM (ref 2.1–4)
LEFT VENTRICLE DIASTOLIC VOLUME INDEX: 142.31 ML/M2
LEFT VENTRICLE DIASTOLIC VOLUME: 254.73 ML
LEFT VENTRICLE MASS INDEX: 233 G/M2
LEFT VENTRICLE SYSTOLIC VOLUME INDEX: 125.8 ML/M2
LEFT VENTRICLE SYSTOLIC VOLUME: 225.16 ML
LEFT VENTRICULAR INTERNAL DIMENSION IN DIASTOLE: 6.99 CM (ref 3.5–6)
LEFT VENTRICULAR MASS: 416.35 G
LV LATERAL E/E' RATIO: 12 M/S
LV SEPTAL E/E' RATIO: 13.09 M/S
LVOT MG: 1.71 MMHG
LVOT MV: 0.63 CM/S
METHADONE UR QL SCN>300 NG/ML: NEGATIVE
MV PEAK E VEL: 1.44 M/S
MV STENOSIS PRESSURE HALF TIME: 25.51 MS
MV VALVE AREA P 1/2 METHOD: 8.62 CM2
OPIATES UR QL SCN: NEGATIVE
PCP UR QL SCN>25 NG/ML: NEGATIVE
PISA MRMAX VEL: 4.33 M/S
PISA TR MAX VEL: 2.36 M/S
POTASSIUM SERPL-SCNC: 3.3 MMOL/L (ref 3.5–5.1)
PV MEAN GRADIENT: 1 MMHG
RA MAJOR: 4.2 CM
RA PRESSURE ESTIMATED: 8 MMHG
RA WIDTH: 3.6 CM
RIGHT VENTRICULAR END-DIASTOLIC DIMENSION: 2.89 CM
RV TB RVSP: 10 MMHG
SODIUM SERPL-SCNC: 140 MMOL/L (ref 136–145)
STJ: 2.96 CM
T3 SERPL-MCNC: 90 NG/DL (ref 60–180)
T4 FREE SERPL-MCNC: 0.93 NG/DL (ref 0.71–1.51)
T4 SERPL-MCNC: 6.8 UG/DL (ref 4.5–11.5)
TDI LATERAL: 0.12 M/S
TDI SEPTAL: 0.11 M/S
TDI: 0.12 M/S
THYROPEROXIDASE IGG SERPL-ACNC: <6 IU/ML
TOXICOLOGY INFORMATION: ABNORMAL
TR MAX PG: 22 MMHG
TR MEAN GRADIENT: 22 MMHG
TRICUSPID ANNULAR PLANE SYSTOLIC EXCURSION: 1.69 CM
TSH SERPL DL<=0.005 MIU/L-ACNC: 0.9 UIU/ML (ref 0.4–4)
TV REST PULMONARY ARTERY PRESSURE: 30 MMHG
Z-SCORE OF LEFT VENTRICULAR DIMENSION IN END DIASTOLE: 3.45
Z-SCORE OF LEFT VENTRICULAR DIMENSION IN END SYSTOLE: 6.1

## 2024-01-21 PROCEDURE — 36415 COLL VENOUS BLD VENIPUNCTURE: CPT | Performed by: STUDENT IN AN ORGANIZED HEALTH CARE EDUCATION/TRAINING PROGRAM

## 2024-01-21 PROCEDURE — 84439 ASSAY OF FREE THYROXINE: CPT | Performed by: NURSE PRACTITIONER

## 2024-01-21 PROCEDURE — 84432 ASSAY OF THYROGLOBULIN: CPT | Performed by: NURSE PRACTITIONER

## 2024-01-21 PROCEDURE — 25000003 PHARM REV CODE 250: Performed by: STUDENT IN AN ORGANIZED HEALTH CARE EDUCATION/TRAINING PROGRAM

## 2024-01-21 PROCEDURE — 80048 BASIC METABOLIC PNL TOTAL CA: CPT | Performed by: NURSE PRACTITIONER

## 2024-01-21 PROCEDURE — 84443 ASSAY THYROID STIM HORMONE: CPT | Performed by: NURSE PRACTITIONER

## 2024-01-21 PROCEDURE — 25000003 PHARM REV CODE 250: Performed by: NURSE PRACTITIONER

## 2024-01-21 PROCEDURE — 85730 THROMBOPLASTIN TIME PARTIAL: CPT | Performed by: STUDENT IN AN ORGANIZED HEALTH CARE EDUCATION/TRAINING PROGRAM

## 2024-01-21 PROCEDURE — 25500020 PHARM REV CODE 255: Performed by: STUDENT IN AN ORGANIZED HEALTH CARE EDUCATION/TRAINING PROGRAM

## 2024-01-21 PROCEDURE — 25000003 PHARM REV CODE 250: Performed by: INTERNAL MEDICINE

## 2024-01-21 PROCEDURE — 99223 1ST HOSP IP/OBS HIGH 75: CPT | Mod: ,,, | Performed by: INTERNAL MEDICINE

## 2024-01-21 PROCEDURE — 86376 MICROSOMAL ANTIBODY EACH: CPT | Performed by: NURSE PRACTITIONER

## 2024-01-21 PROCEDURE — 11000001 HC ACUTE MED/SURG PRIVATE ROOM

## 2024-01-21 PROCEDURE — 63600175 PHARM REV CODE 636 W HCPCS: Performed by: NURSE PRACTITIONER

## 2024-01-21 PROCEDURE — 21400001 HC TELEMETRY ROOM

## 2024-01-21 PROCEDURE — S4991 NICOTINE PATCH NONLEGEND: HCPCS | Performed by: NURSE PRACTITIONER

## 2024-01-21 PROCEDURE — 84480 ASSAY TRIIODOTHYRONINE (T3): CPT | Performed by: NURSE PRACTITIONER

## 2024-01-21 PROCEDURE — 84436 ASSAY OF TOTAL THYROXINE: CPT | Performed by: NURSE PRACTITIONER

## 2024-01-21 PROCEDURE — 85379 FIBRIN DEGRADATION QUANT: CPT | Performed by: STUDENT IN AN ORGANIZED HEALTH CARE EDUCATION/TRAINING PROGRAM

## 2024-01-21 PROCEDURE — 80307 DRUG TEST PRSMV CHEM ANLYZR: CPT | Performed by: STUDENT IN AN ORGANIZED HEALTH CARE EDUCATION/TRAINING PROGRAM

## 2024-01-21 PROCEDURE — 63600175 PHARM REV CODE 636 W HCPCS: Performed by: STUDENT IN AN ORGANIZED HEALTH CARE EDUCATION/TRAINING PROGRAM

## 2024-01-21 RX ORDER — HYDROCODONE BITARTRATE AND ACETAMINOPHEN 7.5; 325 MG/1; MG/1
1 TABLET ORAL EVERY 6 HOURS PRN
Status: DISCONTINUED | OUTPATIENT
Start: 2024-01-21 | End: 2024-01-23 | Stop reason: HOSPADM

## 2024-01-21 RX ORDER — HYDROMORPHONE HYDROCHLORIDE 2 MG/ML
1 INJECTION, SOLUTION INTRAMUSCULAR; INTRAVENOUS; SUBCUTANEOUS ONCE
Status: COMPLETED | OUTPATIENT
Start: 2024-01-21 | End: 2024-01-21

## 2024-01-21 RX ORDER — FUROSEMIDE 10 MG/ML
40 INJECTION INTRAMUSCULAR; INTRAVENOUS
Status: DISCONTINUED | OUTPATIENT
Start: 2024-01-21 | End: 2024-01-23 | Stop reason: HOSPADM

## 2024-01-21 RX ORDER — LOSARTAN POTASSIUM 25 MG/1
25 TABLET ORAL DAILY
Status: DISCONTINUED | OUTPATIENT
Start: 2024-01-21 | End: 2024-01-23

## 2024-01-21 RX ORDER — LIDOCAINE 50 MG/G
1 PATCH TOPICAL
Status: DISCONTINUED | OUTPATIENT
Start: 2024-01-21 | End: 2024-01-23 | Stop reason: HOSPADM

## 2024-01-21 RX ORDER — CHLORDIAZEPOXIDE HYDROCHLORIDE 5 MG/1
5 CAPSULE, GELATIN COATED ORAL 3 TIMES DAILY
Status: DISCONTINUED | OUTPATIENT
Start: 2024-01-21 | End: 2024-01-23 | Stop reason: HOSPADM

## 2024-01-21 RX ORDER — IBUPROFEN 200 MG
1 TABLET ORAL DAILY
Status: DISCONTINUED | OUTPATIENT
Start: 2024-01-21 | End: 2024-01-23 | Stop reason: HOSPADM

## 2024-01-21 RX ORDER — MORPHINE SULFATE 2 MG/ML
2 INJECTION, SOLUTION INTRAMUSCULAR; INTRAVENOUS EVERY 4 HOURS PRN
Status: DISCONTINUED | OUTPATIENT
Start: 2024-01-21 | End: 2024-01-21

## 2024-01-21 RX ORDER — TALC
6 POWDER (GRAM) TOPICAL NIGHTLY PRN
Status: DISCONTINUED | OUTPATIENT
Start: 2024-01-21 | End: 2024-01-23 | Stop reason: HOSPADM

## 2024-01-21 RX ORDER — HEPARIN SODIUM,PORCINE/D5W 25000/250
0-40 INTRAVENOUS SOLUTION INTRAVENOUS CONTINUOUS
Status: DISCONTINUED | OUTPATIENT
Start: 2024-01-21 | End: 2024-01-22

## 2024-01-21 RX ORDER — METOPROLOL TARTRATE 25 MG/1
12.5 TABLET ORAL 2 TIMES DAILY
Status: DISCONTINUED | OUTPATIENT
Start: 2024-01-21 | End: 2024-01-23 | Stop reason: HOSPADM

## 2024-01-21 RX ADMIN — CHLORDIAZEPOXIDE HYDROCHLORIDE 5 MG: 5 CAPSULE ORAL at 08:01

## 2024-01-21 RX ADMIN — CHLORDIAZEPOXIDE HYDROCHLORIDE 5 MG: 5 CAPSULE ORAL at 03:01

## 2024-01-21 RX ADMIN — FUROSEMIDE 40 MG: 10 INJECTION, SOLUTION INTRAMUSCULAR; INTRAVENOUS at 08:01

## 2024-01-21 RX ADMIN — LIDOCAINE 1 PATCH: 50 PATCH CUTANEOUS at 11:01

## 2024-01-21 RX ADMIN — METOPROLOL TARTRATE 12.5 MG: 25 TABLET, FILM COATED ORAL at 09:01

## 2024-01-21 RX ADMIN — HYDROCODONE BITARTRATE AND ACETAMINOPHEN 1 TABLET: 7.5; 325 TABLET ORAL at 09:01

## 2024-01-21 RX ADMIN — HYDROMORPHONE HYDROCHLORIDE 1 MG: 2 INJECTION INTRAMUSCULAR; INTRAVENOUS; SUBCUTANEOUS at 11:01

## 2024-01-21 RX ADMIN — FUROSEMIDE 20 MG: 10 INJECTION, SOLUTION INTRAMUSCULAR; INTRAVENOUS at 08:01

## 2024-01-21 RX ADMIN — METOPROLOL TARTRATE 12.5 MG: 25 TABLET, FILM COATED ORAL at 11:01

## 2024-01-21 RX ADMIN — ASPIRIN 81 MG: 81 TABLET, COATED ORAL at 08:01

## 2024-01-21 RX ADMIN — HEPARIN SODIUM 12 UNITS/KG/HR: 10000 INJECTION, SOLUTION INTRAVENOUS at 12:01

## 2024-01-21 RX ADMIN — IOHEXOL 100 ML: 350 INJECTION, SOLUTION INTRAVENOUS at 02:01

## 2024-01-21 RX ADMIN — NICOTINE 1 PATCH: 21 PATCH, EXTENDED RELEASE TRANSDERMAL at 09:01

## 2024-01-21 RX ADMIN — ONDANSETRON 4 MG: 4 TABLET, ORALLY DISINTEGRATING ORAL at 09:01

## 2024-01-21 RX ADMIN — ONDANSETRON 4 MG: 4 TABLET, ORALLY DISINTEGRATING ORAL at 01:01

## 2024-01-21 RX ADMIN — LOSARTAN POTASSIUM 25 MG: 25 TABLET, FILM COATED ORAL at 11:01

## 2024-01-21 NOTE — HPI
Jaqueline Gutierrez is a 48 y.o. female with a PMH  has a past medical history of Substance abuse and Thyroid disease.  Presented to the ER for evaluation of shortness of breath which gradually worsened over last 4 days.  Patient recently seen by PCP on 1/17/24 for similar complaint and was discharged home with Zyrtec and Flonase.  Patient was recently diagnosed with viral illness that turned into bronchitis on 1/5/24 at .  Patient reports completing Omnicef, cough medicine and steroids. Reports she is still having a sore throat, ear pain, and a post nasal drip.  In addition, patient has been complaining of right calf pain/cramp without any injury or trauma noted.  Denies any fever, aches, chills, sweats, palpitations, headache, lightheadedness, dizziness, abdominal pain common bladder/bowel complaints, or any other symptoms.    Of note, patient states that she has a history of Hashimoto's thyroiditis and has not been on medication for over a year.  Patient states that she has been self treating with snorting methamphetamines.  Patient also reports occasional marijuana use.  Denies any other substance use.    ER workup revealed BNP of 3, 195, initial troponin of 1.551 with repeat troponin of 1.615, flu and COVID negative, lactic acid negative, chest x-ray revealing:  [Cardiomegaly with perihilar edema], ultrasound of right lower extremity revealed:[Positive superficial venous thrombosis within the right superficial saphenous vein.  Negative deep venous thrombosis].  All other lab work unremarkable.  EKG revealed sinus tachycardia with left bundle-branch block with a ventricular rate of 101 beats per minute and a QT/QTC of 412/534.  Patient received 325 mg aspirin, 60 mg of Lasix, 1 mg of Ativan in ED. hospital Medicine consulted to admit patient for new onset CHF.  Patient in agreement with treatment plan.  Patient will be admitted under inpatient status.       PCP: Raul, Jennifer

## 2024-01-21 NOTE — H&P
[facility] [place] [facility]  History & Physical    Patient Name: Jaqueline Gutierrez  MRN: [id number]  Patient Class: IP- Inpatient  Admission Date: 1/20/2024  Attending Physician: Isaac Mcconnell MD   Primary Care Provider: Jennifer Carter         Patient information was obtained from patient, past medical records, and ER records.     Subjective:     Principal Problem:New onset of congestive heart failure    Chief Complaint:   Chief Complaint   Patient presents with    Shortness of Breath     Shortness of breath and getting winded upon exertion x 4 days ago. Dx with URI at urgent care a week ago. Muscle cramping.         HPI: Jaqueline Gutierrez is a 48 y.o. female with a PMH  has a past medical history of Substance abuse and Thyroid disease.  Presented to the ER for evaluation of shortness of breath which gradually worsened over last 4 days.  Patient recently seen by PCP on 1/17/24 for similar complaint and was discharged home with Zyrtec and Flonase.  Patient was recently diagnosed with viral illness that turned into bronchitis on 1/5/24 at .  Patient reports completing Omnicef, cough medicine and steroids. Reports she is still having a sore throat, ear pain, and a post nasal drip.  In addition, patient has been complaining of right calf pain/cramp without any injury or trauma noted.  Denies any fever, aches, chills, sweats, palpitations, headache, lightheadedness, dizziness, abdominal pain common bladder/bowel complaints, or any other symptoms.    Of note, patient states that she has a history of Hashimoto's thyroiditis and has not been on medication for over a year.  Patient states that she has been self treating with snorting methamphetamines.  Patient also reports occasional marijuana use.  Denies any other substance use.    ER workup revealed BNP of 3, 195, initial troponin of 1.551 with repeat troponin of 1.615, flu and COVID negative, lactic acid negative, chest x-ray revealing:   "[Cardiomegaly with perihilar edema], ultrasound of right lower extremity revealed:[Positive superficial venous thrombosis within the right superficial saphenous vein.  Negative deep venous thrombosis].  All other lab work unremarkable.  EKG revealed sinus tachycardia with left bundle-branch block with a ventricular rate of 101 beats per minute and a QT/QTC of 412/534.  Patient received 325 mg aspirin, 60 mg of Lasix, 1 mg of Ativan in ED. hospital Medicine consulted to admit patient for new onset CHF.  Patient in agreement with treatment plan.  Patient will be admitted under inpatient status.       PCP: Jennifer Carter      Past Medical History:   Diagnosis Date    Substance abuse     Thyroid disease        Past Surgical History:   Procedure Laterality Date     SECTION      HYSTERECTOMY      KNEE SURGERY         Review of patient's allergies indicates:   Allergen Reactions    Compazine [prochlorperazine] Other (See Comments)     Panic attack    Pcn [penicillins] Hives    Opioids - morphine analogues Other (See Comments)     "Makes me mean"         No current facility-administered medications on file prior to encounter.     Current Outpatient Medications on File Prior to Encounter   Medication Sig    albuterol (PROVENTIL/VENTOLIN HFA) 90 mcg/actuation inhaler Inhale 2 puffs into the lungs every 4 (four) hours as needed.    cetirizine (ZYRTEC) 10 MG tablet Take 1 tablet by mouth every morning.    fluticasone propionate (FLONASE) 50 mcg/actuation nasal spray 1 spray by Each Nostril route every morning.    omeprazole (PRILOSEC) 20 MG capsule Take 20 mg by mouth once daily.    levothyroxine (SYNTHROID, LEVOTHROID) 175 MCG tablet Take 1 tablet (175 mcg total) by mouth once daily.     Family History    None       Tobacco Use    Smoking status: Every Day     Current packs/day: 0.50     Types: Cigarettes    Smokeless tobacco: Never   Substance and Sexual Activity    Alcohol use: Yes     Comment: occasional    " Drug use: Yes     Types: Marijuana, Methamphetamines    Sexual activity: Not on file     Review of Systems   Constitutional:  Positive for fatigue. Negative for chills, diaphoresis and fever.   HENT:  Positive for congestion and sore throat.    Respiratory:  Positive for cough, chest tightness and shortness of breath.    Cardiovascular:  Negative for palpitations and leg swelling.   All other systems reviewed and are negative.    Objective:     Vital Signs (Most Recent):  Temp: 97.9 °F (36.6 °C) (01/20/24 2256)  Pulse: 93 (01/21/24 0400)  Resp: 20 (01/20/24 2256)  BP: 103/65 (01/20/24 2256)  SpO2: 96 % (01/20/24 2256) Vital Signs (24h Range):  Temp:  [97.6 °F (36.4 °C)-97.9 °F (36.6 °C)] 97.9 °F (36.6 °C)  Pulse:  [] 93  Resp:  [20-32] 20  SpO2:  [96 %-98 %] 96 %  BP: (103-117)/(65-89) 103/65     Weight: 68.9 kg (151 lb 14.4 oz)  Body mass index is 23.79 kg/m².     Physical Exam  Vitals and nursing note reviewed.   Constitutional:       General: She is awake. She is not in acute distress.     Appearance: Normal appearance. She is well-developed and well-groomed. She is not ill-appearing, toxic-appearing or diaphoretic.   HENT:      Head: Normocephalic and atraumatic.      Mouth/Throat:      Lips: Pink.      Mouth: Mucous membranes are moist.      Pharynx: Oropharynx is clear. Uvula midline.   Eyes:      Extraocular Movements: Extraocular movements intact.      Conjunctiva/sclera: Conjunctivae normal.   Cardiovascular:      Rate and Rhythm: Normal rate and regular rhythm.      Heart sounds: Normal heart sounds. No murmur heard.  Pulmonary:      Effort: Pulmonary effort is normal.      Breath sounds: Decreased breath sounds present. No wheezing, rhonchi or rales.   Abdominal:      General: Bowel sounds are normal.      Palpations: Abdomen is soft.      Tenderness: There is no abdominal tenderness.   Musculoskeletal:      Cervical back: Normal range of motion and neck supple.      Comments: 5/5 strength  throughout   Skin:     General: Skin is warm and dry.      Capillary Refill: Capillary refill takes less than 2 seconds.   Neurological:      General: No focal deficit present.      Mental Status: She is alert and oriented to person, place, and time. Mental status is at baseline.      GCS: GCS eye subscore is 4. GCS verbal subscore is 5. GCS motor subscore is 6.      Cranial Nerves: Cranial nerves 2-12 are intact.      Sensory: Sensation is intact.      Motor: Motor function is intact.   Psychiatric:         Mood and Affect: Mood normal.         Speech: Speech normal.         Behavior: Behavior normal. Behavior is cooperative.           LABS:  Recent Results (from the past 24 hour(s))   Influenza A & B by Molecular    Collection Time: 01/20/24  4:04 PM    Specimen: Nasopharyngeal Swab   Result Value Ref Range    Influenza A, Molecular Negative Negative    Influenza B, Molecular Negative Negative    Flu A & B Source Nasal swab    COVID-19 Rapid Screening    Collection Time: 01/20/24  4:04 PM   Result Value Ref Range    SARS-CoV-2 RNA, Amplification, Qual Negative Negative   CBC Auto Differential    Collection Time: 01/20/24  4:46 PM   Result Value Ref Range    WBC 5.31 3.90 - 12.70 K/uL    RBC 3.86 (L) 4.00 - 5.40 M/uL    Hemoglobin 12.0 12.0 - 16.0 g/dL    Hematocrit 35.0 (L) 37.0 - 48.5 %    MCV 91 82 - 98 fL    MCH 31.1 (H) 27.0 - 31.0 pg    MCHC 34.3 32.0 - 36.0 g/dL    RDW 13.0 11.5 - 14.5 %    Platelets 232 150 - 450 K/uL    MPV 10.1 9.2 - 12.9 fL    Immature Granulocytes 0.6 (H) 0.0 - 0.5 %    Gran # (ANC) 4.0 1.8 - 7.7 K/uL    Immature Grans (Abs) 0.03 0.00 - 0.04 K/uL    Lymph # 1.0 1.0 - 4.8 K/uL    Mono # 0.3 0.3 - 1.0 K/uL    Eos # 0.1 0.0 - 0.5 K/uL    Baso # 0.02 0.00 - 0.20 K/uL    nRBC 0 0 /100 WBC    Gran % 74.5 (H) 38.0 - 73.0 %    Lymph % 17.9 (L) 18.0 - 48.0 %    Mono % 5.3 4.0 - 15.0 %    Eosinophil % 1.3 0.0 - 8.0 %    Basophil % 0.4 0.0 - 1.9 %    Differential Method Automated    Comprehensive  Metabolic Panel    Collection Time: 01/20/24  4:46 PM   Result Value Ref Range    Sodium 139 136 - 145 mmol/L    Potassium 3.8 3.5 - 5.1 mmol/L    Chloride 106 95 - 110 mmol/L    CO2 23 23 - 29 mmol/L    Glucose 116 (H) 70 - 110 mg/dL    BUN 12 6 - 20 mg/dL    Creatinine 1.1 0.5 - 1.4 mg/dL    Calcium 8.7 8.7 - 10.5 mg/dL    Total Protein 6.2 6.0 - 8.4 g/dL    Albumin 3.4 (L) 3.5 - 5.2 g/dL    Total Bilirubin 0.5 0.1 - 1.0 mg/dL    Alkaline Phosphatase 106 55 - 135 U/L    AST 52 (H) 10 - 40 U/L    ALT 61 (H) 10 - 44 U/L    eGFR >60 >60 mL/min/1.73 m^2    Anion Gap 10 8 - 16 mmol/L   Troponin I    Collection Time: 01/20/24  4:46 PM   Result Value Ref Range    Troponin I 1.551 (H) 0.000 - 0.026 ng/mL   Brain natriuretic peptide    Collection Time: 01/20/24  4:46 PM   Result Value Ref Range    BNP 3,195 (H) 0 - 99 pg/mL   Lactic acid, plasma    Collection Time: 01/20/24  6:22 PM   Result Value Ref Range    Lactate (Lactic Acid) 0.9 0.5 - 2.2 mmol/L   Hemoglobin A1c    Collection Time: 01/20/24  8:02 PM   Result Value Ref Range    Hemoglobin A1C 5.7 (H) 4.0 - 5.6 %    Estimated Avg Glucose 117 68 - 131 mg/dL   Magnesium    Collection Time: 01/20/24  8:02 PM   Result Value Ref Range    Magnesium 1.6 1.6 - 2.6 mg/dL   Troponin I    Collection Time: 01/20/24  8:02 PM   Result Value Ref Range    Troponin I 1.615 (H) 0.000 - 0.026 ng/mL   Troponin I    Collection Time: 01/20/24 10:57 PM   Result Value Ref Range    Troponin I 1.510 (H) 0.000 - 0.026 ng/mL       RADIOLOGY  US Lower Extremity Veins Right    Result Date: 1/20/2024  EXAMINATION: US LOWER EXTREMITY VEINS RIGHT CLINICAL HISTORY: Pain in right lower leg TECHNIQUE: Duplex and color flow Doppler evaluation and graded compression of the right lower extremity veins was performed. COMPARISON: None FINDINGS: Right thigh veins: The common femoral, femoral, popliteal, upper greater saphenous, and deep femoral veins are patent and free of thrombus. The veins are normally  compressible and have normal phasic flow and augmentation response. Right calf veins: The visualized calf veins are patent. Contralateral CFV: The contralateral (left) common femoral vein is patent and free of thrombus. Miscellaneous: Positive superficial venous thrombosis within the right superficial saphenous vein. Negative deep venous thrombosis. Reported to Dr. Meek at 19:00.     Positive superficial venous thrombosis within the right superficial saphenous vein.  Negative deep venous thrombosis.  Reported to Dr. Neri at 19:00. Electronically signed by: Richy Almonte Date:    01/20/2024 Time:    19:03    X-Ray Chest 1 View    Result Date: 1/20/2024  EXAMINATION: XR CHEST 1 VIEW CLINICAL HISTORY: shortness of breath; TECHNIQUE: Single frontal view of the chest was performed. COMPARISON: None FINDINGS: Cardiomegaly with perihilar edema.  Correlate clinically for CHF. Bones are intact.  Nipple markers are seen.     As above Electronically signed by: Richy Almonte Date:    01/20/2024 Time:    16:24      EKG    MICROBIOLOGY    MDM     Amount and/or Complexity of Data Reviewed  Clinical lab tests: reviewed  Tests in the radiology section of CPT®: reviewed  Tests in the medicine section of CPT®: reviewed  Discussion of test results with the performing providers: yes  Decide to obtain previous medical records or to obtain history from someone other than the patient: yes  Obtain history from someone other than the patient: yes  Review and summarize past medical records: yes  Discuss the patient with other providers: yes  Independent visualization of images, tracings, or specimens: yes        Assessment/Plan:     * New onset of congestive heart failure  Patient is identified as having  unspecified  heart failure that is Acute. CHF is currently uncontrolled due to Pulmonary edema/pleural effusion on CXR. Latest ECHO performed and demonstrates- No results found for this or any previous visit.  . Continue Furosemide and  monitor clinical status closely. Monitor on telemetry. Patient is on CHF pathway.  Monitor strict Is&Os and daily weights.  Place on fluid restriction of 1.5 L. Cardiology has been consulted. Continue to stress to patient importance of self efficacy and  on diet for CHF. Last BNP reviewed- and noted below   Recent Labs   Lab 01/20/24 1646   BNP 3,195*       Venous embolism and thrombosis of superficial vessels of lower extremity  Plan:   -initiated on Lovenox       Thyroid Disease       Plan:       -check thyroid levels    Substance abuse  Regular use of methamphetamines via snorting and THC use.  Plan:   -cessation education  -outpatient rehab resources      Tobacco use  Patient reports smoking approximately 1 pack of cigarettes daily with no thoughts of cutting back currently. Patient educated on morbidity and mortality in regards to continuation of smoking and stressed importance of cessation. Patient currently declined nicotine patch at time of admission but will be available at patient's request.  Plan:  -Nicotine patch available at patient's request            VTE Risk Mitigation (From admission, onward)           Ordered     enoxaparin injection 40 mg  Daily         01/20/24 1917     Place MARVIN hose  Until discontinued         01/20/24 1917     IP VTE LOW RISK PATIENT  Once         01/20/24 1917                     //Core Measures   -DVT proph: SCDs, lovenox  -Code status Full    -Surrogate:none      Components of this note were documented using a voice recognition system and are subject to errors not corrected at the time the document was proof read. Please contact the author for any clarifications.       Jorge Mcconnell NP  Department of Hospital Medicine  [facility]

## 2024-01-21 NOTE — ASSESSMENT & PLAN NOTE
Regular use of methamphetamines via snorting and THC use.  Plan:   -cessation education  -outpatient rehab resources

## 2024-01-21 NOTE — PHARMACY MED REC
"Admission Medication History     The home medication history was taken by Daniel Tang.    You may go to "Admission" then "Reconcile Home Medications" tabs to review and/or act upon these items.     The home medication list has been updated by the Pharmacy department.   Please read ALL comments highlighted in yellow.   Please address this information as you see fit.    Feel free to contact us if you have any questions or require assistance.      Medications listed below were obtained from: Patient/family and Analytic software- [employer]  (Not in a hospital admission)        Daniel Tang  [id number]      Current Outpatient Medications on File Prior to Encounter   Medication Sig Dispense Refill Last Dose    albuterol (PROVENTIL/VENTOLIN HFA) 90 mcg/actuation inhaler Inhale 2 puffs into the lungs every 4 (four) hours as needed.   Past Week    cetirizine (ZYRTEC) 10 MG tablet Take 1 tablet by mouth every morning.   Past Week    fluticasone propionate (FLONASE) 50 mcg/actuation nasal spray 1 spray by Each Nostril route every morning.   Past Week    omeprazole (PRILOSEC) 20 MG capsule Take 20 mg by mouth once daily.   Past Week    levothyroxine (SYNTHROID, LEVOTHROID) 175 MCG tablet Take 1 tablet (175 mcg total) by mouth once daily. 30 tablet 11                          .          "

## 2024-01-21 NOTE — PROGRESS NOTES
[facility] Medicine  Progress Note     Patient Name:  Jaqueline Gutierrez  MRN:  [id number]  Patient Class: IP-Inpatient  Admission Date:  1/20/2024   Length of Stay:  1  Attending Physician: Sanjeev Ford MD  Primary Care Provider: Jennifer Carter    Subjective:      Principal Problem: New onset of congestive heart failure         HPI:  Jaqueline Gutierrez is a 48 y.o. female with a PMH  has a past medical history of Substance abuse and Thyroid disease.  Presented to the ER for evaluation of shortness of breath which gradually worsened over last 4 days.  Patient recently seen by PCP on 1/17/24 for similar complaint and was discharged home with Zyrtec and Flonase.  Patient was recently diagnosed with viral illness that turned into bronchitis on 1/5/24 at .  Patient reports completing Omnicef, cough medicine and steroids. Reports she is still having a sore throat, ear pain, and a post nasal drip.  In addition, patient has been complaining of right calf pain/cramp without any injury or trauma noted.  Denies any fever, aches, chills, sweats, palpitations, headache, lightheadedness, dizziness, abdominal pain common bladder/bowel complaints, or any other symptoms.     Of note, patient states that she has a history of Hashimoto's thyroiditis and has not been on medication for over a year.  Patient states that she has been self treating with snorting methamphetamines.  Patient also reports occasional marijuana use.  Denies any other substance use.     ER workup revealed BNP of 3, 195, initial troponin of 1.551 with repeat troponin of 1.615, flu and COVID negative, lactic acid negative, chest x-ray revealing:  [Cardiomegaly with perihilar edema], ultrasound of right lower extremity revealed:[Positive superficial venous thrombosis within the right superficial saphenous vein.  Negative deep venous thrombosis].  All other lab work unremarkable.  EKG revealed sinus tachycardia with  "left bundle-branch block with a ventricular rate of 101 beats per minute and a QT/QTC of 412/534.  Patient received 325 mg aspirin, 60 mg of Lasix, 1 mg of Ativan in ED. hospital Medicine consulted to admit patient for new onset CHF.  Patient in agreement with treatment plan.  Patient will be admitted under inpatient status.         Overview/Hospital Course:  01/21/2024  Complain of intractable right-sided chest pain.  Initially refused a.m. labs until she received IV Dilaudid, but relented when we suggested we would not give IV Dilaudid until we had labs drawn.  Patient resting comfortably in her bed 30 minutes after administration, minimally cooperative during our encounter.     Interval Hx  See hospital course.    Objective  /64 (BP Location: Right arm, Patient Position: Lying)   Pulse 104   Temp 97.3 °F (36.3 °C) (Oral)   Resp 16   Ht 5' 7" (1.702 m)   Wt 68.5 kg (151 lb)   LMP  (LMP Unknown)   SpO2 (!) 93%   Breastfeeding No   BMI 23.65 kg/m²     Intake/Output Summary (Last 24 hours) at 1/21/2024 1333  Last data filed at 1/21/2024 1039  Gross per 24 hour   Intake 660 ml   Output 2800 ml   Net -2140 ml       PHYSICAL EXAM  Vitals and nursing note reviewed.   Constitutional:       General: She is awake. She is not in acute distress.     Appearance: Normal appearance. She is well-developed and well-groomed. She is not ill-appearing, toxic-appearing or diaphoretic.   HENT:      Head: Normocephalic and atraumatic.      Mouth/Throat:      Lips: Pink.      Mouth: Mucous membranes are moist.      Pharynx: Oropharynx is clear. Uvula midline.   Eyes:      Extraocular Movements: Extraocular movements intact.      Conjunctiva/sclera: Conjunctivae normal.   Cardiovascular:      Rate and Rhythm: Normal rate and regular rhythm.      Heart sounds: Normal heart sounds. No murmur heard.  Pulmonary:      Effort: Pulmonary effort is normal.      Breath sounds: No wheezing, rhonchi or rales.   Abdominal:      " General: Bowel sounds are normal.      Palpations: Abdomen is soft.      Tenderness: There is no abdominal tenderness.   Musculoskeletal:      Cervical back: Normal range of motion and neck supple.      Comments: 5/5 strength throughout   Skin:     General: Skin is warm and dry.      Capillary Refill: Capillary refill takes less than 2 seconds.   Neurological:      General: No focal deficit present.      Mental Status: She is alert and oriented to person, place, and time. Mental status is at baseline.      GCS: GCS eye subscore is 4. GCS verbal subscore is 5. GCS motor subscore is 6.      Cranial Nerves: Cranial nerves 2-12 are intact.      Sensory: Sensation is intact.      Motor: Motor function is intact.   Psychiatric:         Mood and Affect: Mood normal.         Speech: Speech normal.         Behavior: Behavior normal. Behavior is cooperative.    LABS  All labs from the past 24 hours were reviewed.     BMP:   Recent Labs   Lab 01/20/24 2002 01/21/24  1100   GLU  --  88   NA  --  140   K  --  3.3*   CL  --  101   CO2  --  27   BUN  --  10   CREATININE  --  1.1   CALCIUM  --  8.9   MG 1.6  --      CBC:   Recent Labs   Lab 01/20/24 1646   WBC 5.31   HGB 12.0   HCT 35.0*        CMP:   Recent Labs   Lab 01/20/24 1646 01/21/24  1100    140   K 3.8 3.3*    101   CO2 23 27   * 88   BUN 12 10   CREATININE 1.1 1.1   CALCIUM 8.7 8.9   PROT 6.2  --    ALBUMIN 3.4*  --    BILITOT 0.5  --    ALKPHOS 106  --    AST 52*  --    ALT 61*  --    ANIONGAP 10 12     Cardiac Markers:   Recent Labs   Lab 01/20/24  1646   BNP 3,195*     Coagulation:   Recent Labs   Lab 01/21/24  1101   APTT 25.2     Lactic Acid:   Recent Labs   Lab 01/20/24  1822   LACTATE 0.9     Magnesium:   Recent Labs   Lab 01/20/24  2002   MG 1.6     Troponin:   Recent Labs   Lab 01/20/24 1646 01/20/24 2002 01/20/24  2257   TROPONINI 1.551* 1.615* 1.510*     TSH:   Recent Labs   Lab 01/21/24  1101   TSH 0.904     Urine Studies:   No  "results for input(s): "COLORU", "APPEARANCEUA", "PHUR", "SPECGRAV", "PROTEINUA", "GLUCUA", "KETONESU", "BILIRUBINUA", "OCCULTUA", "NITRITE", "UROBILINOGEN", "LEUKOCYTESUR", "RBCUA", "WBCUA", "BACTERIA", "SQUAMEPITHEL", "HYALINECASTS" in the last 48 hours.    Invalid input(s): "WRIGHTSUR"    IMAGING  All imaging from the past 24 hours were reviewed.     Imaging Results              US Lower Extremity Veins Right (Final result)  Result time 01/20/24 19:03:08      Final result by Richy Almonte MD (01/20/24 19:03:08)                   Impression:      Positive superficial venous thrombosis within the right superficial saphenous vein.  Negative deep venous thrombosis.  Reported to Dr. Neri at 19:00.      Electronically signed by: Richy Almonte  Date:    01/20/2024  Time:    19:03               Narrative:    EXAMINATION:  US LOWER EXTREMITY VEINS RIGHT    CLINICAL HISTORY:  Pain in right lower leg    TECHNIQUE:  Duplex and color flow Doppler evaluation and graded compression of the right lower extremity veins was performed.    COMPARISON:  None    FINDINGS:  Right thigh veins: The common femoral, femoral, popliteal, upper greater saphenous, and deep femoral veins are patent and free of thrombus. The veins are normally compressible and have normal phasic flow and augmentation response.    Right calf veins: The visualized calf veins are patent.    Contralateral CFV: The contralateral (left) common femoral vein is patent and free of thrombus.    Miscellaneous: Positive superficial venous thrombosis within the right superficial saphenous vein. Negative deep venous thrombosis. Reported to Dr. Meek at 19:00.                                       X-Ray Chest 1 View (Final result)  Result time 01/20/24 16:24:50      Final result by Richy Almonte MD (01/20/24 16:24:50)                   Impression:      As above      Electronically signed by: Richy Almonte  Date:    01/20/2024  Time:    16:24               Narrative:    " EXAMINATION:  XR CHEST 1 VIEW    CLINICAL HISTORY:  shortness of breath;    TECHNIQUE:  Single frontal view of the chest was performed.    COMPARISON:  None    FINDINGS:  Cardiomegaly with perihilar edema.  Correlate clinically for CHF.    Bones are intact.  Nipple markers are seen.                                      Assessment/Plan:      * New onset of congestive heart failure  The patient has acute unspecified heart failure with uncontrolled CHF due to pulmonary edema/pleural effusion on CXR. They are on the CHF pathway, continuing Furosemide, and being monitored closely, including telemetry, strict Is&Os, and daily weights. Fluid restriction of 1.5 L has been implemented, and cardiology has been consulted. Patient education on self-efficacy and CHF diet counseling is ongoing, with the last reviewed BNP at 3,195 on 01/20/24.     01/21/2024  --echo ordered upon admission  --no prior echo on file for comparison  --no edema on exam, overall euvolemic  --s/p 60 mg IV lasix dose in the ER  --will schedule IV Lasix 40 mg q.12 hours while hospitalized  --chest pain plus elevated troponins; heparin drip initiated  --monitor electrolytes, UOP  --strict I&O and daily weights ordered  --cardiology consulted for evaluation, follow-up recommendations     Venous embolism and thrombosis of superficial vessels of lower extremity  01/21/2024  Given severe acute chest pain complaints this morning and an elevated D-dimer, CTA chest ordered to rule out acute pulmonary embolism.   --lovenox transitioned to heparin gtt  --echo pending, will see if there is R heart strain  --follow-up CTA chest     Substance abuse  Regular use of methamphetamines via snorting and THC use.  Plan:   -cessation education  -outpatient rehab resources    01/21/2024  Previously show any signs withdrawal  Started scheduled low-dose Librium t.i.d.  Monitor clinically, seizure precautions      Tobacco use  Patient reports smoking approximately 1 pack of  cigarettes daily with no thoughts of cutting back currently. Patient educated on morbidity and mortality in regards to continuation of smoking and stressed importance of cessation. Patient currently declined nicotine patch at time of admission but will be available at patient's request.  Plan:  -Nicotine patch available at patient's request       Thyroid Disease- stable    CORE MEASURES:  VTE Risk Mitigation (From admission, onward)           Ordered     heparin 25,000 units in dextrose 5% (100 units/ml) IV bolus from bag - ADDITIONAL PRN BOLUS - 60 units/kg (max bolus 4000 units)  As needed (PRN)        Question:  Heparin Infusion Adjustment (DO NOT MODIFY ANSWER)  Answer:  \[email or web address]\epic\Images\Pharmacy\HeparinInfusions\heparin LOW INTENSITY nomogram for [facility] [email or web address]    01/21/24 0845     heparin 25,000 units in dextrose 5% (100 units/ml) IV bolus from bag - ADDITIONAL PRN BOLUS - 30 units/kg (max bolus 4000 units)  As needed (PRN)        Question:  Heparin Infusion Adjustment (DO NOT MODIFY ANSWER)  Answer:  \[email or web address]\epic\Images\Pharmacy\HeparinInfusions\heparin LOW INTENSITY nomogram for [facility] [id number]    01/21/24 0845     heparin 25,000 units in dextrose 5% 250 mL (100 units/mL) infusion LOW INTENSITY nomogram - [facility]  Continuous        Question:  Begin at (units/kg/hr)  Answer:  12    01/21/24 0845     Place MARVIN hose  Until discontinued         01/20/24 1917     IP VTE LOW RISK PATIENT  Once         01/20/24 1917                    Code Status: FULL    Diet: Diet Low Sodium, 2gm Fluid - 1500mL    Disposition:  Follow-up CTA chest, follow-up cardiology recommendations in regards to new onset CHF       Sanjeev Ford MD  Department of Hospital Medicine   [facility] - Telemetry ([facility])

## 2024-01-21 NOTE — CONSULTS
[facility]  Cardiology  Consult Note    Patient Name: Jaqueline Gutierrez  MRN: [id number]  Admission Date: 1/20/2024  Hospital Length of Stay: 1 days  Code Status: Full Code   Attending Provider: Sanjeev Ford MD   Consulting Provider: Don Mcnulty MD  Primary Care Physician: Raul, Provider  Principal Problem:New onset of congestive heart failure    Patient information was obtained from patient, past medical records, and ER records.     Inpatient consult to Cardiology  Consult performed by: Gerri Mcnulty MD  Consult ordered by: Jorge Mcconnell NP        Subjective:     Chief Complaint:  CHF LBBB     HPI:   HPI: Jaqueline Gutierrez is a 48 y.o. female with a PMH  has a past medical history of Substance abuse and Thyroid disease.  Presented to the ER for evaluation of shortness of breath which gradually worsened over last 4 days.  Patient recently seen by PCP on 1/17/24 for similar complaint and was discharged home with Zyrtec and Flonase.  Patient was recently diagnosed with viral illness that turned into bronchitis on 1/5/24 at .  Patient reports completing Omnicef, cough medicine and steroids. Reports she is still having a sore throat, ear pain, and a post nasal drip.  In addition, patient has been complaining of right calf pain/cramp without any injury or trauma noted.  Denies any fever, aches, chills, sweats, palpitations, headache, lightheadedness, dizziness, abdominal pain common bladder/bowel complaints, or any other symptoms.     Of note, patient states that she has a history of Hashimoto's thyroiditis and has not been on medication for over a year.  Patient states that she has been self treating with snorting methamphetamines.  Patient also reports occasional marijuana use.  Denies any other substance use.     ER workup revealed BNP of 3, 195, initial troponin of 1.551 with repeat troponin of 1.615, flu and COVID negative, lactic acid negative, chest x-ray revealing:   "[Cardiomegaly with perihilar edema], ultrasound of right lower extremity revealed:[Positive superficial venous thrombosis within the right superficial saphenous vein.  Negative deep venous thrombosis].  All other lab work unremarkable.  EKG revealed sinus tachycardia with left bundle-branch block with a ventricular rate of 101 beats per minute and a QT/QTC of 412/534.  Patient received 325 mg aspirin, 60 mg of Lasix, 1 mg of Ativan in ED. hospital Medicine consulted to admit patient for new onset CHF.  Patient in agreement with treatment plan.  Patient will be admitted under inpatient status.        CARDS CONSULT OBTAINED FOR CHF. SHE IS RESPONDING TO DIURETICS CLINICALLY [facility] PROGRESSIVE COUGH ORTHOPNEA SHORTNESS OF BREATH DECREASE INE XERCISE TOLERANCE HER ECHO SUGGEST A DEPRESSED EF.SHE IS DECOMPENSATED  ACUTE SYSTOLIC CHF. ON INITIAL EVAL SHE SEEMS COMFORTABLE LYING IN BED. HER EKG SHOWED NEW LBBB.    Past Medical History:   Diagnosis Date    Substance abuse     Thyroid disease        Past Surgical History:   Procedure Laterality Date     SECTION      HYSTERECTOMY      KNEE SURGERY         Review of patient's allergies indicates:   Allergen Reactions    Compazine [prochlorperazine] Other (See Comments)     Panic attack    Pcn [penicillins] Hives    Opioids - morphine analogues Other (See Comments)     "Makes me mean"         No current facility-administered medications on file prior to encounter.     Current Outpatient Medications on File Prior to Encounter   Medication Sig    albuterol (PROVENTIL/VENTOLIN HFA) 90 mcg/actuation inhaler Inhale 2 puffs into the lungs every 4 (four) hours as needed.    cetirizine (ZYRTEC) 10 MG tablet Take 1 tablet by mouth every morning.    fluticasone propionate (FLONASE) 50 mcg/actuation nasal spray 1 spray by Each Nostril route every morning.    omeprazole (PRILOSEC) 20 MG capsule Take 20 mg by mouth once daily.    levothyroxine (SYNTHROID, LEVOTHROID) 175 MCG tablet " Take 1 tablet (175 mcg total) by mouth once daily.     Family History    None       Tobacco Use    Smoking status: Every Day     Current packs/day: 0.50     Types: Cigarettes    Smokeless tobacco: Never   Substance and Sexual Activity    Alcohol use: Yes     Comment: occasional    Drug use: Yes     Types: Marijuana, Methamphetamines    Sexual activity: Not on file     Review of Systems   Constitutional: Negative for diaphoresis, malaise/fatigue and weight gain.   HENT:  Negative for hoarse voice.    Eyes:  Negative for double vision and visual disturbance.   Cardiovascular:  Positive for dyspnea on exertion. Negative for chest pain, claudication, cyanosis, irregular heartbeat, leg swelling, near-syncope, orthopnea, palpitations, paroxysmal nocturnal dyspnea and syncope.   Respiratory:  Positive for cough, shortness of breath and wheezing. Negative for hemoptysis and snoring.    Hematologic/Lymphatic: Negative for bleeding problem. Does not bruise/bleed easily.   Skin:  Negative for color change and poor wound healing.   Musculoskeletal:  Negative for muscle cramps, muscle weakness and myalgias.   Gastrointestinal:  Negative for bloating, abdominal pain, change in bowel habit, diarrhea, heartburn, hematemesis, hematochezia, melena and nausea.   Neurological:  Negative for excessive daytime sleepiness, dizziness, headaches, light-headedness, loss of balance, numbness and weakness.   Psychiatric/Behavioral:  Negative for memory loss. The patient does not have insomnia.    Allergic/Immunologic: Negative for hives.     Objective:     Vital Signs (Most Recent):  Temp: 97.4 °F (36.3 °C) (01/21/24 1643)  Pulse: 85 (01/21/24 1643)  Resp: 18 (01/21/24 1643)  BP: 104/66 (01/21/24 1643)  SpO2: 96 % (01/21/24 1643) Vital Signs (24h Range):  Temp:  [97.3 °F (36.3 °C)-97.9 °F (36.6 °C)] 97.4 °F (36.3 °C)  Pulse:  [] 85  Resp:  [16-32] 18  SpO2:  [93 %-98 %] 96 %  BP: (102-119)/(64-89) 104/66     Weight: 68.5 kg (151  lb)  Body mass index is 23.65 kg/m².    SpO2: 96 %         Intake/Output Summary (Last 24 hours) at 1/21/2024 1659  Last data filed at 1/21/2024 1523  Gross per 24 hour   Intake 660 ml   Output 3000 ml   Net -2340 ml       Lines/Drains/Airways       Peripheral Intravenous Line  Duration                  Peripheral IV - Single Lumen 01/20/24 1646 20 G Left Antecubital 1 day                     Physical Exam  Constitutional:       General: She is not in acute distress.     Appearance: Normal appearance. She is well-developed. She is not ill-appearing.   HENT:      Head: Normocephalic and atraumatic.   Eyes:      General: No scleral icterus.     Pupils: Pupils are equal, round, and reactive to light.   Neck:      Thyroid: No thyromegaly.      Vascular: Normal carotid pulses. No carotid bruit, hepatojugular reflux or JVD.      Trachea: No tracheal deviation.   Cardiovascular:      Rate and Rhythm: Normal rate and regular rhythm.      Pulses: Normal pulses.      Heart sounds: Normal heart sounds. No murmur heard.     No friction rub. No gallop.   Pulmonary:      Effort: Pulmonary effort is normal. No respiratory distress.      Breath sounds: Rales present. No wheezing or rhonchi.   Chest:      Chest wall: No tenderness.   Abdominal:      General: Bowel sounds are normal. There is no abdominal bruit.      Palpations: Abdomen is soft. There is no hepatomegaly or pulsatile mass.      Tenderness: There is no abdominal tenderness.   Musculoskeletal:      Right shoulder: No deformity.      Cervical back: Normal range of motion and neck supple.      Right lower leg: No edema.      Left lower leg: No edema.   Skin:     General: Skin is warm and dry.      Findings: No erythema or rash.      Nails: There is no clubbing.   Neurological:      General: No focal deficit present.      Mental Status: She is alert and oriented to person, place, and time.      Cranial Nerves: No cranial nerve deficit.      Coordination: Coordination  normal.   Psychiatric:         Mood and Affect: Mood normal.         Speech: Speech normal.         Behavior: Behavior normal.          Significant Labs:   Recent Lab Results  (Last 5 results in the past 24 hours)        01/21/24  1101   01/21/24  1100   01/21/24  1004   01/20/24  2257   01/20/24 2002        Benzodiazepines     Negative           Methadone metabolites     Negative           Phencyclidine     Negative           Amphetamine Screen, Ur     Presumptive Positive           Anion Gap   12             aPTT 25.2  Comment: Refer to local heparin nomogram for intensity/dose specific   therapeutic   range.                 Barbiturate Screen, Ur     Negative           BUN   10             Calcium   8.9             Chloride   101             CO2   27             Cocaine (Metab.)     Negative           Creatinine   1.1             Creatinine, Urine     57.9           D-Dimer 2.78  Comment: The quantitative D-dimer assay should be used as an aid in   the diagnosis of deep vein thrombosis and pulmonary embolism  in patients with the appropriate presentation and clinical  history. The upper limit of the reference interval and the clinical   cut off   point are identical. Causes of a positive (>0.50 mg/L FEU) D-Dimer   test  include, but are not limited to: DVT, PE, DIC, thrombolytic   therapy, anticoagulant therapy, recent surgery, trauma, or   pregnancy, disseminated malignancy, aortic aneurysm, cirrhosis,  and severe infection. False negative results may occur in   patients with distal DVT.                 eGFR   >60             Estimated Avg Glucose         117       Free T4 0.93               Glucose   88             Hemoglobin A1C External         5.7  Comment: ADA Screening Guidelines:  5.7-6.4%  Consistent with prediabetes  >or=6.5%  Consistent with diabetes    High levels of fetal hemoglobin interfere with the HbA1C  assay. Heterozygous hemoglobin variants (HbS, HgC, etc)do  not significantly interfere with  this assay.   However, presence of multiple variants may affect accuracy.         Magnesium          1.6       Opiate Scrn, Ur     Negative           Potassium   3.3             Sodium   140             T3, Total   90             T4 Total   6.8             Marijuana (THC) Metabolite     Presumptive Positive           Toxicology Information     SEE COMMENT  Comment: This screen includes the following classes of drugs at the listed   cut-off:    Benzodiazepines 200 ng/ml  Methadone 300 ng/ml  Cocaine metabolite 300 ng/ml  Opiates 300 ng/ml  Barbiturates 200 ng/ml  Amphetamines 1000 ng/ml  Marijuana metabs (THC) 50 ng/ml  Phencyclidine (PCP) 25 ng/ml    This is a screening test. If results do not correlate with clinical   presentation, then a confirmatory send out test is advised.     This report is intended for use in clinical monitoring and management   of   patients. It is not intended for use in employment related drug   testing.             Troponin I       1.510  Comment: The reference interval for Troponin I represents the 99th percentile   cutoff   for our facility and is consistent with 3rd generation assay   performance.     1.615  Comment: The reference interval for Troponin I represents the 99th percentile   cutoff   for our facility and is consistent with 3rd generation assay   performance.         TSH 0.904                                      Significant Imaging: X-Ray: CXR: X-Ray Chest 1 View (CXR):   Results for orders placed or performed during the hospital encounter of 01/20/24   X-Ray Chest 1 View    Narrative    EXAMINATION:  XR CHEST 1 VIEW    CLINICAL HISTORY:  shortness of breath;    TECHNIQUE:  Single frontal view of the chest was performed.    COMPARISON:  None    FINDINGS:  Cardiomegaly with perihilar edema.  Correlate clinically for CHF.    Bones are intact.  Nipple markers are seen.      Impression    As above      Electronically signed by: Richy Almonte  Date:    01/20/2024  Time:    16:24      Assessment and Plan:     * New onset of congestive heart failure    Recent Labs   Lab 01/20/24  1646   BNP 3,195*   HAS ACIUTE DECOMPENSATED SYSTOLIC CHF WILL DIURESE AND OPTIMIZE MEDICAL THERAPY AS BP ALLOWS. FLUID RESTRICTION LOW SALT DIET SMOKING CESSATION DISCUSSED.  HAS NEW ONSET LBBB THIS REQUIRES AN ISCHEMIC EVAL ONCE EUVOLEMIC DUE TO HER RF AND FH I THINK R/LHC WOULD BE IDEAL.    Elevated troponin  SECONDARY TO CHF DEMAND ISCHEMIA SHE WILL NEED R/LHC WHEN EUVOLEMIC.    Tobacco use  SMOKING CESSATION.    Venous embolism and thrombosis of superficial vessels of lower extremity  PER PRIMARY TEAM        VTE Risk Mitigation (From admission, onward)           Ordered     heparin 25,000 units in dextrose 5% (100 units/ml) IV bolus from bag - ADDITIONAL PRN BOLUS - 60 units/kg (max bolus 4000 units)  As needed (PRN)        Question:  Heparin Infusion Adjustment (DO NOT MODIFY ANSWER)  Answer:  [email or web address]\epic\Images\Pharmacy\HeparinInfusions\heparin LOW INTENSITY nomogram for [facility] [id number].pdf    01/21/24 0845     heparin 25,000 units in dextrose 5% (100 units/ml) IV bolus from bag - ADDITIONAL PRN BOLUS - 30 units/kg (max bolus 4000 units)  As needed (PRN)        Question:  Heparin Infusion Adjustment (DO NOT MODIFY ANSWER)  Answer:  \\[email or web address]\epic\Images\Pharmacy\HeparinInfusions\heparin LOW INTENSITY nomogram for [facility] [id number].pdf    01/21/24 0845     heparin 25,000 units in dextrose 5% 250 mL (100 units/mL) infusion LOW INTENSITY nomogram - [facility]  Continuous        Question:  Begin at (units/kg/hr)  Answer:  12    01/21/24 0845     Place MARVIN hose  Until discontinued         01/20/24 1917     IP VTE LOW RISK PATIENT  Once         01/20/24 1917                    Thank you for your consult. I will follow-up with patient. Please contact us if you have any additional questions.    Don Mcnulty MD  Cardiology   [facility]

## 2024-01-21 NOTE — SUBJECTIVE & OBJECTIVE
"Past Medical History:   Diagnosis Date    Substance abuse     Thyroid disease        Past Surgical History:   Procedure Laterality Date     SECTION      HYSTERECTOMY      KNEE SURGERY         Review of patient's allergies indicates:   Allergen Reactions    Compazine [prochlorperazine] Other (See Comments)     Panic attack    Pcn [penicillins] Hives    Opioids - morphine analogues Other (See Comments)     "Makes me mean"         No current facility-administered medications on file prior to encounter.     Current Outpatient Medications on File Prior to Encounter   Medication Sig    albuterol (PROVENTIL/VENTOLIN HFA) 90 mcg/actuation inhaler Inhale 2 puffs into the lungs every 4 (four) hours as needed.    cetirizine (ZYRTEC) 10 MG tablet Take 1 tablet by mouth every morning.    fluticasone propionate (FLONASE) 50 mcg/actuation nasal spray 1 spray by Each Nostril route every morning.    omeprazole (PRILOSEC) 20 MG capsule Take 20 mg by mouth once daily.    levothyroxine (SYNTHROID, LEVOTHROID) 175 MCG tablet Take 1 tablet (175 mcg total) by mouth once daily.     Family History    None       Tobacco Use    Smoking status: Every Day     Current packs/day: 0.50     Types: Cigarettes    Smokeless tobacco: Never   Substance and Sexual Activity    Alcohol use: Yes     Comment: occasional    Drug use: Yes     Types: Marijuana, Methamphetamines    Sexual activity: Not on file     Review of Systems   Constitutional: Negative for diaphoresis, malaise/fatigue and weight gain.   HENT:  Negative for hoarse voice.    Eyes:  Negative for double vision and visual disturbance.   Cardiovascular:  Positive for dyspnea on exertion. Negative for chest pain, claudication, cyanosis, irregular heartbeat, leg swelling, near-syncope, orthopnea, palpitations, paroxysmal nocturnal dyspnea and syncope.   Respiratory:  Positive for cough, shortness of breath and wheezing. Negative for hemoptysis and snoring.    Hematologic/Lymphatic: " Negative for bleeding problem. Does not bruise/bleed easily.   Skin:  Negative for color change and poor wound healing.   Musculoskeletal:  Negative for muscle cramps, muscle weakness and myalgias.   Gastrointestinal:  Negative for bloating, abdominal pain, change in bowel habit, diarrhea, heartburn, hematemesis, hematochezia, melena and nausea.   Neurological:  Negative for excessive daytime sleepiness, dizziness, headaches, light-headedness, loss of balance, numbness and weakness.   Psychiatric/Behavioral:  Negative for memory loss. The patient does not have insomnia.    Allergic/Immunologic: Negative for hives.     Objective:     Vital Signs (Most Recent):  Temp: 97.4 °F (36.3 °C) (01/21/24 1643)  Pulse: 85 (01/21/24 1643)  Resp: 18 (01/21/24 1643)  BP: 104/66 (01/21/24 1643)  SpO2: 96 % (01/21/24 1643) Vital Signs (24h Range):  Temp:  [97.3 °F (36.3 °C)-97.9 °F (36.6 °C)] 97.4 °F (36.3 °C)  Pulse:  [] 85  Resp:  [16-32] 18  SpO2:  [93 %-98 %] 96 %  BP: (102-119)/(64-89) 104/66     Weight: 68.5 kg (151 lb)  Body mass index is 23.65 kg/m².    SpO2: 96 %         Intake/Output Summary (Last 24 hours) at 1/21/2024 1659  Last data filed at 1/21/2024 1523  Gross per 24 hour   Intake 660 ml   Output 3000 ml   Net -2340 ml       Lines/Drains/Airways       Peripheral Intravenous Line  Duration                  Peripheral IV - Single Lumen 01/20/24 1646 20 G Left Antecubital 1 day                     Physical Exam  Constitutional:       General: She is not in acute distress.     Appearance: Normal appearance. She is well-developed. She is not ill-appearing.   HENT:      Head: Normocephalic and atraumatic.   Eyes:      General: No scleral icterus.     Pupils: Pupils are equal, round, and reactive to light.   Neck:      Thyroid: No thyromegaly.      Vascular: Normal carotid pulses. No carotid bruit, hepatojugular reflux or JVD.      Trachea: No tracheal deviation.   Cardiovascular:      Rate and Rhythm: Normal rate and  regular rhythm.      Pulses: Normal pulses.      Heart sounds: Normal heart sounds. No murmur heard.     No friction rub. No gallop.   Pulmonary:      Effort: Pulmonary effort is normal. No respiratory distress.      Breath sounds: Rales present. No wheezing or rhonchi.   Chest:      Chest wall: No tenderness.   Abdominal:      General: Bowel sounds are normal. There is no abdominal bruit.      Palpations: Abdomen is soft. There is no hepatomegaly or pulsatile mass.      Tenderness: There is no abdominal tenderness.   Musculoskeletal:      Right shoulder: No deformity.      Cervical back: Normal range of motion and neck supple.      Right lower leg: No edema.      Left lower leg: No edema.   Skin:     General: Skin is warm and dry.      Findings: No erythema or rash.      Nails: There is no clubbing.   Neurological:      General: No focal deficit present.      Mental Status: She is alert and oriented to person, place, and time.      Cranial Nerves: No cranial nerve deficit.      Coordination: Coordination normal.   Psychiatric:         Mood and Affect: Mood normal.         Speech: Speech normal.         Behavior: Behavior normal.          Significant Labs:   Recent Lab Results  (Last 5 results in the past 24 hours)        01/21/24  1101   01/21/24  1100   01/21/24  1004   01/20/24  2257   01/20/24 2002        Benzodiazepines     Negative           Methadone metabolites     Negative           Phencyclidine     Negative           Amphetamine Screen, Ur     Presumptive Positive           Anion Gap   12             aPTT 25.2  Comment: Refer to local heparin nomogram for intensity/dose specific   therapeutic   range.                 Barbiturate Screen, Ur     Negative           BUN   10             Calcium   8.9             Chloride   101             CO2   27             Cocaine (Metab.)     Negative           Creatinine   1.1             Creatinine, Urine     57.9           D-Dimer 2.78  Comment: The quantitative  D-dimer assay should be used as an aid in   the diagnosis of deep vein thrombosis and pulmonary embolism  in patients with the appropriate presentation and clinical  history. The upper limit of the reference interval and the clinical   cut off   point are identical. Causes of a positive (>0.50 mg/L FEU) D-Dimer   test  include, but are not limited to: DVT, PE, DIC, thrombolytic   therapy, anticoagulant therapy, recent surgery, trauma, or   pregnancy, disseminated malignancy, aortic aneurysm, cirrhosis,  and severe infection. False negative results may occur in   patients with distal DVT.                 eGFR   >60             Estimated Avg Glucose         117       Free T4 0.93               Glucose   88             Hemoglobin A1C External         5.7  Comment: ADA Screening Guidelines:  5.7-6.4%  Consistent with prediabetes  >or=6.5%  Consistent with diabetes    High levels of fetal hemoglobin interfere with the HbA1C  assay. Heterozygous hemoglobin variants (HbS, HgC, etc)do  not significantly interfere with this assay.   However, presence of multiple variants may affect accuracy.         Magnesium          1.6       Opiate Scrn, Ur     Negative           Potassium   3.3             Sodium   140             T3, Total   90             T4 Total   6.8             Marijuana (THC) Metabolite     Presumptive Positive           Toxicology Information     SEE COMMENT  Comment: This screen includes the following classes of drugs at the listed   cut-off:    Benzodiazepines 200 ng/ml  Methadone 300 ng/ml  Cocaine metabolite 300 ng/ml  Opiates 300 ng/ml  Barbiturates 200 ng/ml  Amphetamines 1000 ng/ml  Marijuana metabs (THC) 50 ng/ml  Phencyclidine (PCP) 25 ng/ml    This is a screening test. If results do not correlate with clinical   presentation, then a confirmatory send out test is advised.     This report is intended for use in clinical monitoring and management   of   patients. It is not intended for use in employment  related drug   testing.             Troponin I       1.510  Comment: The reference interval for Troponin I represents the 99th percentile   cutoff   for our facility and is consistent with 3rd generation assay   performance.     1.615  Comment: The reference interval for Troponin I represents the 99th percentile   cutoff   for our facility and is consistent with 3rd generation assay   performance.         TSH 0.904                                      Significant Imaging: X-Ray: CXR: X-Ray Chest 1 View (CXR):   Results for orders placed or performed during the hospital encounter of 01/20/24   X-Ray Chest 1 View    Narrative    EXAMINATION:  XR CHEST 1 VIEW    CLINICAL HISTORY:  shortness of breath;    TECHNIQUE:  Single frontal view of the chest was performed.    COMPARISON:  None    FINDINGS:  Cardiomegaly with perihilar edema.  Correlate clinically for CHF.    Bones are intact.  Nipple markers are seen.      Impression    As above      Electronically signed by: Richy Almonte  Date:    01/20/2024  Time:    16:24

## 2024-01-21 NOTE — HPI
HPI: Jaqueline Gutierrez is a 48 y.o. female with a PMH  has a past medical history of Substance abuse and Thyroid disease.  Presented to the ER for evaluation of shortness of breath which gradually worsened over last 4 days.  Patient recently seen by PCP on 1/17/24 for similar complaint and was discharged home with Zyrtec and Flonase.  Patient was recently diagnosed with viral illness that turned into bronchitis on 1/5/24 at .  Patient reports completing Omnicef, cough medicine and steroids. Reports she is still having a sore throat, ear pain, and a post nasal drip.  In addition, patient has been complaining of right calf pain/cramp without any injury or trauma noted.  Denies any fever, aches, chills, sweats, palpitations, headache, lightheadedness, dizziness, abdominal pain common bladder/bowel complaints, or any other symptoms.     Of note, patient states that she has a history of Hashimoto's thyroiditis and has not been on medication for over a year.  Patient states that she has been self treating with snorting methamphetamines.  Patient also reports occasional marijuana use.  Denies any other substance use.     ER workup revealed BNP of 3, 195, initial troponin of 1.551 with repeat troponin of 1.615, flu and COVID negative, lactic acid negative, chest x-ray revealing:  [Cardiomegaly with perihilar edema], ultrasound of right lower extremity revealed:[Positive superficial venous thrombosis within the right superficial saphenous vein.  Negative deep venous thrombosis].  All other lab work unremarkable.  EKG revealed sinus tachycardia with left bundle-branch block with a ventricular rate of 101 beats per minute and a QT/QTC of 412/534.  Patient received 325 mg aspirin, 60 mg of Lasix, 1 mg of Ativan in ED. hospital Medicine consulted to admit patient for new onset CHF.  Patient in agreement with treatment plan.  Patient will be admitted under inpatient status.        CARDS CONSULT OBTAINED FOR CHF. SHE IS  RESPONDING TO DIURETICS CLINICALLY [facility] PROGRESSIVE COUGH ORTHOPNEA SHORTNESS OF BREATH DECREASE INE XERCISE TOLERANCE HER ECHO SUGGEST A DEPRESSED EF.SHE IS DECOMPENSATED  ACUTE SYSTOLIC CHF. ON INITIAL EVAL SHE SEEMS COMFORTABLE LYING IN BED. HER EKG SHOWED NEW LBBB.

## 2024-01-21 NOTE — PLAN OF CARE
Discussed poc with pt, pt verbalized understanding    VS wnl  Cardiac monitoring in use, pt is NSR to ST  Fall precautions in place, remains injury free  Pain, nausea, and anxiety under control with PRN meds  Heparin drip started. Heparin protocol in place and aPTT ordered    Accurate I&Os  Bed locked at lowest position  Call light within reach    Chart check complete  Will cont with POC

## 2024-01-21 NOTE — ASSESSMENT & PLAN NOTE
Patient is identified as having  unspecified  heart failure that is Acute. CHF is currently uncontrolled due to Pulmonary edema/pleural effusion on CXR. Latest ECHO performed and demonstrates- No results found for this or any previous visit.  . Continue Furosemide and monitor clinical status closely. Monitor on telemetry. Patient is on CHF pathway.  Monitor strict Is&Os and daily weights.  Place on fluid restriction of 1.5 L. Cardiology has been consulted. Continue to stress to patient importance of self efficacy and  on diet for CHF. Last BNP reviewed- and noted below   Recent Labs   Lab 01/20/24  1646   BNP 3,195*

## 2024-01-21 NOTE — PLAN OF CARE
Problem: Adult Inpatient Plan of Care  Goal: Plan of Care Review  Outcome: Ongoing, Progressing     Problem: Adult Inpatient Plan of Care  Goal: Patient-Specific Goal (Individualized)  Outcome: Ongoing, Progressing  Flowsheets (Taken 1/21/2024 0308)  Anxieties, Fears or Concerns: worried about labs  Individualized Care Needs: pain control     Problem: Pain Acute  Goal: Acceptable Pain Control and Functional Ability  Outcome: Ongoing, Progressing     Problem: Fall Injury Risk  Goal: Absence of Fall and Fall-Related Injury  Outcome: Ongoing, Progressing     Problem: Fatigue  Goal: Improved Activity Tolerance  Outcome: Ongoing, Progressing       Discussed POC with pt and friend, verbalized understanding.  Patient remains free from injury.  Safety precautions maintained.   Call light and personal belongings within reach, bed in lowest position with bed wheels locked.   No s/s of acute distress.  Purposeful rounding continued this shift.  Pain controlled per MD order.  Cardiac monitoring in place, tele box number [id number]. Reading normal sinus  Diet orders continued, pt diet: low sodium 1500 mL fluid restriction  Vital signs continued per orders this shift.   Q2 repositioning independently  Patient mobility status independent  Chart and orders review completed. Pt education about care completed.

## 2024-01-21 NOTE — SUBJECTIVE & OBJECTIVE
"Past Medical History:   Diagnosis Date    Substance abuse     Thyroid disease        Past Surgical History:   Procedure Laterality Date     SECTION      HYSTERECTOMY      KNEE SURGERY         Review of patient's allergies indicates:   Allergen Reactions    Compazine [prochlorperazine] Other (See Comments)     Panic attack    Pcn [penicillins] Hives    Opioids - morphine analogues Other (See Comments)     "Makes me mean"         No current facility-administered medications on file prior to encounter.     Current Outpatient Medications on File Prior to Encounter   Medication Sig    albuterol (PROVENTIL/VENTOLIN HFA) 90 mcg/actuation inhaler Inhale 2 puffs into the lungs every 4 (four) hours as needed.    cetirizine (ZYRTEC) 10 MG tablet Take 1 tablet by mouth every morning.    fluticasone propionate (FLONASE) 50 mcg/actuation nasal spray 1 spray by Each Nostril route every morning.    omeprazole (PRILOSEC) 20 MG capsule Take 20 mg by mouth once daily.    levothyroxine (SYNTHROID, LEVOTHROID) 175 MCG tablet Take 1 tablet (175 mcg total) by mouth once daily.     Family History    None       Tobacco Use    Smoking status: Every Day     Current packs/day: 0.50     Types: Cigarettes    Smokeless tobacco: Never   Substance and Sexual Activity    Alcohol use: Yes     Comment: occasional    Drug use: Yes     Types: Marijuana, Methamphetamines    Sexual activity: Not on file     Review of Systems   Constitutional:  Positive for fatigue. Negative for chills, diaphoresis and fever.   HENT:  Positive for congestion and sore throat.    Respiratory:  Positive for cough, chest tightness and shortness of breath.    Cardiovascular:  Negative for palpitations and leg swelling.   All other systems reviewed and are negative.    Objective:     Vital Signs (Most Recent):  Temp: 97.9 °F (36.6 °C) (24)  Pulse: 93 (24 0400)  Resp: 20 (24)  BP: 103/65 (24)  SpO2: 96 % (24) Vital Signs " (24h Range):  Temp:  [97.6 °F (36.4 °C)-97.9 °F (36.6 °C)] 97.9 °F (36.6 °C)  Pulse:  [] 93  Resp:  [20-32] 20  SpO2:  [96 %-98 %] 96 %  BP: (103-117)/(65-89) 103/65     Weight: 68.9 kg (151 lb 14.4 oz)  Body mass index is 23.79 kg/m².     Physical Exam  Vitals and nursing note reviewed.   Constitutional:       General: She is awake. She is not in acute distress.     Appearance: Normal appearance. She is well-developed and well-groomed. She is not ill-appearing, toxic-appearing or diaphoretic.   HENT:      Head: Normocephalic and atraumatic.      Mouth/Throat:      Lips: Pink.      Mouth: Mucous membranes are moist.      Pharynx: Oropharynx is clear. Uvula midline.   Eyes:      Extraocular Movements: Extraocular movements intact.      Conjunctiva/sclera: Conjunctivae normal.   Cardiovascular:      Rate and Rhythm: Normal rate and regular rhythm.      Heart sounds: Normal heart sounds. No murmur heard.  Pulmonary:      Effort: Pulmonary effort is normal.      Breath sounds: Decreased breath sounds present. No wheezing, rhonchi or rales.   Abdominal:      General: Bowel sounds are normal.      Palpations: Abdomen is soft.      Tenderness: There is no abdominal tenderness.   Musculoskeletal:      Cervical back: Normal range of motion and neck supple.      Comments: 5/5 strength throughout   Skin:     General: Skin is warm and dry.      Capillary Refill: Capillary refill takes less than 2 seconds.   Neurological:      General: No focal deficit present.      Mental Status: She is alert and oriented to person, place, and time. Mental status is at baseline.      GCS: GCS eye subscore is 4. GCS verbal subscore is 5. GCS motor subscore is 6.      Cranial Nerves: Cranial nerves 2-12 are intact.      Sensory: Sensation is intact.      Motor: Motor function is intact.   Psychiatric:         Mood and Affect: Mood normal.         Speech: Speech normal.         Behavior: Behavior normal. Behavior is cooperative.            LABS:  Recent Results (from the past 24 hour(s))   Influenza A & B by Molecular    Collection Time: 01/20/24  4:04 PM    Specimen: Nasopharyngeal Swab   Result Value Ref Range    Influenza A, Molecular Negative Negative    Influenza B, Molecular Negative Negative    Flu A & B Source Nasal swab    COVID-19 Rapid Screening    Collection Time: 01/20/24  4:04 PM   Result Value Ref Range    SARS-CoV-2 RNA, Amplification, Qual Negative Negative   CBC Auto Differential    Collection Time: 01/20/24  4:46 PM   Result Value Ref Range    WBC 5.31 3.90 - 12.70 K/uL    RBC 3.86 (L) 4.00 - 5.40 M/uL    Hemoglobin 12.0 12.0 - 16.0 g/dL    Hematocrit 35.0 (L) 37.0 - 48.5 %    MCV 91 82 - 98 fL    MCH 31.1 (H) 27.0 - 31.0 pg    MCHC 34.3 32.0 - 36.0 g/dL    RDW 13.0 11.5 - 14.5 %    Platelets 232 150 - 450 K/uL    MPV 10.1 9.2 - 12.9 fL    Immature Granulocytes 0.6 (H) 0.0 - 0.5 %    Gran # (ANC) 4.0 1.8 - 7.7 K/uL    Immature Grans (Abs) 0.03 0.00 - 0.04 K/uL    Lymph # 1.0 1.0 - 4.8 K/uL    Mono # 0.3 0.3 - 1.0 K/uL    Eos # 0.1 0.0 - 0.5 K/uL    Baso # 0.02 0.00 - 0.20 K/uL    nRBC 0 0 /100 WBC    Gran % 74.5 (H) 38.0 - 73.0 %    Lymph % 17.9 (L) 18.0 - 48.0 %    Mono % 5.3 4.0 - 15.0 %    Eosinophil % 1.3 0.0 - 8.0 %    Basophil % 0.4 0.0 - 1.9 %    Differential Method Automated    Comprehensive Metabolic Panel    Collection Time: 01/20/24  4:46 PM   Result Value Ref Range    Sodium 139 136 - 145 mmol/L    Potassium 3.8 3.5 - 5.1 mmol/L    Chloride 106 95 - 110 mmol/L    CO2 23 23 - 29 mmol/L    Glucose 116 (H) 70 - 110 mg/dL    BUN 12 6 - 20 mg/dL    Creatinine 1.1 0.5 - 1.4 mg/dL    Calcium 8.7 8.7 - 10.5 mg/dL    Total Protein 6.2 6.0 - 8.4 g/dL    Albumin 3.4 (L) 3.5 - 5.2 g/dL    Total Bilirubin 0.5 0.1 - 1.0 mg/dL    Alkaline Phosphatase 106 55 - 135 U/L    AST 52 (H) 10 - 40 U/L    ALT 61 (H) 10 - 44 U/L    eGFR >60 >60 mL/min/1.73 m^2    Anion Gap 10 8 - 16 mmol/L   Troponin I    Collection Time: 01/20/24  4:46 PM    Result Value Ref Range    Troponin I 1.551 (H) 0.000 - 0.026 ng/mL   Brain natriuretic peptide    Collection Time: 01/20/24  4:46 PM   Result Value Ref Range    BNP 3,195 (H) 0 - 99 pg/mL   Lactic acid, plasma    Collection Time: 01/20/24  6:22 PM   Result Value Ref Range    Lactate (Lactic Acid) 0.9 0.5 - 2.2 mmol/L   Hemoglobin A1c    Collection Time: 01/20/24  8:02 PM   Result Value Ref Range    Hemoglobin A1C 5.7 (H) 4.0 - 5.6 %    Estimated Avg Glucose 117 68 - 131 mg/dL   Magnesium    Collection Time: 01/20/24  8:02 PM   Result Value Ref Range    Magnesium 1.6 1.6 - 2.6 mg/dL   Troponin I    Collection Time: 01/20/24  8:02 PM   Result Value Ref Range    Troponin I 1.615 (H) 0.000 - 0.026 ng/mL   Troponin I    Collection Time: 01/20/24 10:57 PM   Result Value Ref Range    Troponin I 1.510 (H) 0.000 - 0.026 ng/mL       RADIOLOGY  US Lower Extremity Veins Right    Result Date: 1/20/2024  EXAMINATION: US LOWER EXTREMITY VEINS RIGHT CLINICAL HISTORY: Pain in right lower leg TECHNIQUE: Duplex and color flow Doppler evaluation and graded compression of the right lower extremity veins was performed. COMPARISON: None FINDINGS: Right thigh veins: The common femoral, femoral, popliteal, upper greater saphenous, and deep femoral veins are patent and free of thrombus. The veins are normally compressible and have normal phasic flow and augmentation response. Right calf veins: The visualized calf veins are patent. Contralateral CFV: The contralateral (left) common femoral vein is patent and free of thrombus. Miscellaneous: Positive superficial venous thrombosis within the right superficial saphenous vein. Negative deep venous thrombosis. Reported to Dr. Meek at 19:00.     Positive superficial venous thrombosis within the right superficial saphenous vein.  Negative deep venous thrombosis.  Reported to Dr. Neri at 19:00. Electronically signed by: Richy Almonte Date:    01/20/2024 Time:    19:03    X-Ray Chest 1  View    Result Date: 1/20/2024  EXAMINATION: XR CHEST 1 VIEW CLINICAL HISTORY: shortness of breath; TECHNIQUE: Single frontal view of the chest was performed. COMPARISON: None FINDINGS: Cardiomegaly with perihilar edema.  Correlate clinically for CHF. Bones are intact.  Nipple markers are seen.     As above Electronically signed by: Richy Almonte Date:    01/20/2024 Time:    16:24      EKG    MICROBIOLOGY    MDM     Amount and/or Complexity of Data Reviewed  Clinical lab tests: reviewed  Tests in the radiology section of CPT®: reviewed  Tests in the medicine section of CPT®: reviewed  Discussion of test results with the performing providers: yes  Decide to obtain previous medical records or to obtain history from someone other than the patient: yes  Obtain history from someone other than the patient: yes  Review and summarize past medical records: yes  Discuss the patient with other providers: yes  Independent visualization of images, tracings, or specimens: yes

## 2024-01-21 NOTE — ASSESSMENT & PLAN NOTE
Patient reports smoking approximately 1 pack of cigarettes daily with no thoughts of cutting back currently. Patient educated on morbidity and mortality in regards to continuation of smoking and stressed importance of cessation. Patient currently declined nicotine patch at time of admission but will be available at patient's request.  Plan:  -Nicotine patch available at patient's request

## 2024-01-21 NOTE — PLAN OF CARE
[facility] - Med Surg  Initial Discharge Assessment       Primary Care Provider: Jennifer Carter    Admission Diagnosis: Shortness of breath [R06.02]  Left bundle branch block [I44.7]  Troponin level elevated [R79.89]  Venous embolism and thrombosis of superficial vessels of lower extremity, unspecified laterality [I82.819]  Right calf pain [M79.661]  New onset of congestive heart failure [I50.9]  Acute congestive heart failure, unspecified heart failure type [I50.9]    Admission Date: 1/20/2024  Expected Discharge Date: Per Attending     Transition of Care Barriers: None    Payor: MEDICAID / Plan: [employer] / Product Type: Managed Medicaid /     Extended Emergency Contact Information  Primary Emergency Contact: Sue Downing   [facility]  Home Phone: [phone]  Relation: Mother    Discharge Plan A: Home with family       No Pharmacies Listed    Initial Assessment (most recent)       Adult Discharge Assessment - 01/21/24 1355          Discharge Assessment    Assessment Type Discharge Planning Assessment     Confirmed/corrected address, phone number and insurance Yes     Confirmed Demographics Correct on Facesheet     Source of Information patient     Communicated SHOBHA with patient/caregiver Date not available/Unable to determine     Reason For Admission new onset of congestive heart failure     People in Home parent(s)     Do you expect to return to your current living situation? Yes     Do you have help at home or someone to help you manage your care at home? No     Prior to hospitilization cognitive status: Alert/Oriented     Current cognitive status: Alert/Oriented     Walking or Climbing Stairs Difficulty no     Dressing/Bathing Difficulty no     Home Layout Able to live on 1st floor     Equipment Currently Used at Home none     Readmission within 30 days? No     Patient currently being followed by outpatient case management? No     Do you currently have service(s) that help you manage  your care at home? No     Do you take prescription medications? Yes     Do you have prescription coverage? Yes     Coverage [facility] Medicaid     Do you have any problems affording any of your prescribed medications? No     Is the patient taking medications as prescribed? yes     Who is going to help you get home at discharge? family     How do you get to doctors appointments? car, drives self     Are you on dialysis? No     Do you take coumadin? No     Discharge Plan A Home with family     DME Needed Upon Discharge  none     Discharge Plan discussed with: Patient     Transition of Care Barriers None                      Sw met with patient to complete assessment and to discuss d/c planning needs. Patient has no d/c needs at this time. Sw to follow up, as needed, for d/c planning purposes.

## 2024-01-21 NOTE — ASSESSMENT & PLAN NOTE
Recent Labs   Lab 01/20/24  1646   BNP 3,195*   HAS ACIUTE DECOMPENSATED SYSTOLIC CHF WILL DIURESE AND OPTIMIZE MEDICAL THERAPY AS BP ALLOWS. FLUID RESTRICTION LOW SALT DIET SMOKING CESSATION DISCUSSED.  HAS NEW ONSET LBBB THIS REQUIRES AN ISCHEMIC EVAL ONCE EUVOLEMIC DUE TO HER RF AND FH I THINK R/LHC WOULD BE IDEAL.

## 2024-01-22 LAB
ANION GAP SERPL CALC-SCNC: 10 MMOL/L (ref 8–16)
APTT PPP: 26.4 SEC (ref 21–32)
APTT PPP: 34.6 SEC (ref 21–32)
APTT PPP: 40.5 SEC (ref 21–32)
APTT PPP: 47.6 SEC (ref 21–32)
BASOPHILS # BLD AUTO: 0.02 K/UL (ref 0–0.2)
BASOPHILS NFR BLD: 0.3 % (ref 0–1.9)
BUN SERPL-MCNC: 10 MG/DL (ref 6–20)
CALCIUM SERPL-MCNC: 8.8 MG/DL (ref 8.7–10.5)
CHLORIDE SERPL-SCNC: 98 MMOL/L (ref 95–110)
CHOLEST SERPL-MCNC: 215 MG/DL (ref 120–199)
CHOLEST/HDLC SERPL: 5.5 {RATIO} (ref 2–5)
CO2 SERPL-SCNC: 28 MMOL/L (ref 23–29)
CREAT SERPL-MCNC: 1 MG/DL (ref 0.5–1.4)
DIFFERENTIAL METHOD BLD: NORMAL
EOSINOPHIL # BLD AUTO: 0.2 K/UL (ref 0–0.5)
EOSINOPHIL NFR BLD: 2.8 % (ref 0–8)
ERYTHROCYTE [DISTWIDTH] IN BLOOD BY AUTOMATED COUNT: 13 % (ref 11.5–14.5)
EST. GFR  (NO RACE VARIABLE): >60 ML/MIN/1.73 M^2
GLUCOSE SERPL-MCNC: 109 MG/DL (ref 70–110)
HCT VFR BLD AUTO: 42.5 % (ref 37–48.5)
HDLC SERPL-MCNC: 39 MG/DL (ref 40–75)
HDLC SERPL: 18.1 % (ref 20–50)
HGB BLD-MCNC: 14.6 G/DL (ref 12–16)
IMM GRANULOCYTES # BLD AUTO: 0.02 K/UL (ref 0–0.04)
IMM GRANULOCYTES NFR BLD AUTO: 0.3 % (ref 0–0.5)
LDLC SERPL CALC-MCNC: 151.4 MG/DL (ref 63–159)
LYMPHOCYTES # BLD AUTO: 1.1 K/UL (ref 1–4.8)
LYMPHOCYTES NFR BLD: 19.4 % (ref 18–48)
MCH RBC QN AUTO: 30.9 PG (ref 27–31)
MCHC RBC AUTO-ENTMCNC: 34.4 G/DL (ref 32–36)
MCV RBC AUTO: 90 FL (ref 82–98)
MONOCYTES # BLD AUTO: 0.5 K/UL (ref 0.3–1)
MONOCYTES NFR BLD: 9 % (ref 4–15)
NEUTROPHILS # BLD AUTO: 3.9 K/UL (ref 1.8–7.7)
NEUTROPHILS NFR BLD: 68.2 % (ref 38–73)
NONHDLC SERPL-MCNC: 176 MG/DL
NRBC BLD-RTO: 0 /100 WBC
PLATELET # BLD AUTO: 241 K/UL (ref 150–450)
PMV BLD AUTO: 10.5 FL (ref 9.2–12.9)
POCT GLUCOSE: 137 MG/DL (ref 70–110)
POTASSIUM SERPL-SCNC: 3.3 MMOL/L (ref 3.5–5.1)
RBC # BLD AUTO: 4.73 M/UL (ref 4–5.4)
SODIUM SERPL-SCNC: 136 MMOL/L (ref 136–145)
TRIGL SERPL-MCNC: 123 MG/DL (ref 30–150)
WBC # BLD AUTO: 5.77 K/UL (ref 3.9–12.7)

## 2024-01-22 PROCEDURE — 4A023N8 MEASUREMENT OF CARDIAC SAMPLING AND PRESSURE, BILATERAL, PERCUTANEOUS APPROACH: ICD-10-PCS | Performed by: INTERNAL MEDICINE

## 2024-01-22 PROCEDURE — 25000003 PHARM REV CODE 250: Performed by: INTERNAL MEDICINE

## 2024-01-22 PROCEDURE — C1751 CATH, INF, PER/CENT/MIDLINE: HCPCS | Performed by: INTERNAL MEDICINE

## 2024-01-22 PROCEDURE — 21400001 HC TELEMETRY ROOM

## 2024-01-22 PROCEDURE — 25500020 PHARM REV CODE 255: Performed by: INTERNAL MEDICINE

## 2024-01-22 PROCEDURE — 85025 COMPLETE CBC W/AUTO DIFF WBC: CPT | Performed by: STUDENT IN AN ORGANIZED HEALTH CARE EDUCATION/TRAINING PROGRAM

## 2024-01-22 PROCEDURE — B2111ZZ FLUOROSCOPY OF MULTIPLE CORONARY ARTERIES USING LOW OSMOLAR CONTRAST: ICD-10-PCS | Performed by: INTERNAL MEDICINE

## 2024-01-22 PROCEDURE — 99152 MOD SED SAME PHYS/QHP 5/>YRS: CPT | Mod: ,,, | Performed by: INTERNAL MEDICINE

## 2024-01-22 PROCEDURE — 27201423 OPTIME MED/SURG SUP & DEVICES STERILE SUPPLY: Performed by: INTERNAL MEDICINE

## 2024-01-22 PROCEDURE — 80061 LIPID PANEL: CPT | Performed by: INTERNAL MEDICINE

## 2024-01-22 PROCEDURE — B2151ZZ FLUOROSCOPY OF LEFT HEART USING LOW OSMOLAR CONTRAST: ICD-10-PCS | Performed by: INTERNAL MEDICINE

## 2024-01-22 PROCEDURE — 93459 L HRT ART/GRFT ANGIO: CPT | Mod: 26,,, | Performed by: INTERNAL MEDICINE

## 2024-01-22 PROCEDURE — 93459 L HRT ART/GRFT ANGIO: CPT | Performed by: INTERNAL MEDICINE

## 2024-01-22 PROCEDURE — 63600175 PHARM REV CODE 636 W HCPCS: Performed by: INTERNAL MEDICINE

## 2024-01-22 PROCEDURE — 63600175 PHARM REV CODE 636 W HCPCS: Performed by: STUDENT IN AN ORGANIZED HEALTH CARE EDUCATION/TRAINING PROGRAM

## 2024-01-22 PROCEDURE — 25000003 PHARM REV CODE 250: Performed by: STUDENT IN AN ORGANIZED HEALTH CARE EDUCATION/TRAINING PROGRAM

## 2024-01-22 PROCEDURE — 85730 THROMBOPLASTIN TIME PARTIAL: CPT | Performed by: STUDENT IN AN ORGANIZED HEALTH CARE EDUCATION/TRAINING PROGRAM

## 2024-01-22 PROCEDURE — C1760 CLOSURE DEV, VASC: HCPCS | Performed by: INTERNAL MEDICINE

## 2024-01-22 PROCEDURE — 80048 BASIC METABOLIC PNL TOTAL CA: CPT | Performed by: NURSE PRACTITIONER

## 2024-01-22 PROCEDURE — 99232 SBSQ HOSP IP/OBS MODERATE 35: CPT | Mod: 25,,, | Performed by: INTERNAL MEDICINE

## 2024-01-22 PROCEDURE — 99153 MOD SED SAME PHYS/QHP EA: CPT | Performed by: INTERNAL MEDICINE

## 2024-01-22 PROCEDURE — S4991 NICOTINE PATCH NONLEGEND: HCPCS | Performed by: NURSE PRACTITIONER

## 2024-01-22 PROCEDURE — 99152 MOD SED SAME PHYS/QHP 5/>YRS: CPT | Performed by: INTERNAL MEDICINE

## 2024-01-22 PROCEDURE — 36415 COLL VENOUS BLD VENIPUNCTURE: CPT | Mod: XB | Performed by: STUDENT IN AN ORGANIZED HEALTH CARE EDUCATION/TRAINING PROGRAM

## 2024-01-22 PROCEDURE — 11000001 HC ACUTE MED/SURG PRIVATE ROOM

## 2024-01-22 PROCEDURE — C1769 GUIDE WIRE: HCPCS | Performed by: INTERNAL MEDICINE

## 2024-01-22 PROCEDURE — C1894 INTRO/SHEATH, NON-LASER: HCPCS | Performed by: INTERNAL MEDICINE

## 2024-01-22 PROCEDURE — 25000003 PHARM REV CODE 250: Performed by: NURSE PRACTITIONER

## 2024-01-22 PROCEDURE — 94761 N-INVAS EAR/PLS OXIMETRY MLT: CPT

## 2024-01-22 RX ORDER — LOSARTAN POTASSIUM 25 MG/1
25 TABLET ORAL DAILY
Qty: 90 TABLET | Refills: 3 | Status: SHIPPED | OUTPATIENT
Start: 2024-01-23 | End: 2024-01-31

## 2024-01-22 RX ORDER — POTASSIUM CHLORIDE 750 MG/1
10 TABLET, EXTENDED RELEASE ORAL ONCE
Status: COMPLETED | OUTPATIENT
Start: 2024-01-22 | End: 2024-01-22

## 2024-01-22 RX ORDER — ATROPINE SULFATE 0.1 MG/ML
0.5 INJECTION INTRAVENOUS
Status: DISCONTINUED | OUTPATIENT
Start: 2024-01-22 | End: 2024-01-23 | Stop reason: HOSPADM

## 2024-01-22 RX ORDER — NITROGLYCERIN 0.4 MG/1
0.4 TABLET SUBLINGUAL EVERY 5 MIN PRN
Status: DISCONTINUED | OUTPATIENT
Start: 2024-01-22 | End: 2024-01-23 | Stop reason: HOSPADM

## 2024-01-22 RX ORDER — DIPHENHYDRAMINE HCL 25 MG
50 CAPSULE ORAL ONCE
Status: COMPLETED | OUTPATIENT
Start: 2024-01-22 | End: 2024-01-22

## 2024-01-22 RX ORDER — SODIUM CHLORIDE 0.9 % (FLUSH) 0.9 %
10 SYRINGE (ML) INJECTION
Status: DISCONTINUED | OUTPATIENT
Start: 2024-01-22 | End: 2024-01-23 | Stop reason: HOSPADM

## 2024-01-22 RX ORDER — DIPHENHYDRAMINE HYDROCHLORIDE 50 MG/ML
INJECTION INTRAMUSCULAR; INTRAVENOUS
Status: DISCONTINUED | OUTPATIENT
Start: 2024-01-22 | End: 2024-01-22 | Stop reason: HOSPADM

## 2024-01-22 RX ORDER — ASPIRIN 81 MG/1
81 TABLET ORAL DAILY
Qty: 90 TABLET | Refills: 3 | Status: SHIPPED | OUTPATIENT
Start: 2024-01-23 | End: 2025-01-22

## 2024-01-22 RX ORDER — SODIUM CHLORIDE 9 MG/ML
INJECTION, SOLUTION INTRAVENOUS CONTINUOUS
Status: ACTIVE | OUTPATIENT
Start: 2024-01-22 | End: 2024-01-22

## 2024-01-22 RX ORDER — MIDAZOLAM HYDROCHLORIDE 1 MG/ML
INJECTION, SOLUTION INTRAMUSCULAR; INTRAVENOUS
Status: DISCONTINUED | OUTPATIENT
Start: 2024-01-22 | End: 2024-01-22 | Stop reason: HOSPADM

## 2024-01-22 RX ORDER — ACETAMINOPHEN 325 MG/1
650 TABLET ORAL EVERY 4 HOURS PRN
Status: DISCONTINUED | OUTPATIENT
Start: 2024-01-22 | End: 2024-01-23

## 2024-01-22 RX ORDER — NITROGLYCERIN 0.4 MG/1
0.4 TABLET SUBLINGUAL EVERY 5 MIN PRN
Qty: 25 TABLET | Refills: 0 | Status: SHIPPED | OUTPATIENT
Start: 2024-01-22 | End: 2024-04-05 | Stop reason: SDUPTHER

## 2024-01-22 RX ORDER — LIDOCAINE HYDROCHLORIDE 20 MG/ML
INJECTION, SOLUTION INFILTRATION; PERINEURAL
Status: DISCONTINUED | OUTPATIENT
Start: 2024-01-22 | End: 2024-01-22 | Stop reason: HOSPADM

## 2024-01-22 RX ORDER — FENTANYL CITRATE 50 UG/ML
INJECTION, SOLUTION INTRAMUSCULAR; INTRAVENOUS
Status: DISCONTINUED | OUTPATIENT
Start: 2024-01-22 | End: 2024-01-22 | Stop reason: HOSPADM

## 2024-01-22 RX ORDER — SODIUM CHLORIDE 9 MG/ML
INJECTION, SOLUTION INTRAVENOUS
Status: DISCONTINUED | OUTPATIENT
Start: 2024-01-22 | End: 2024-01-23 | Stop reason: HOSPADM

## 2024-01-22 RX ORDER — METOPROLOL TARTRATE 25 MG/1
12.5 TABLET, FILM COATED ORAL 2 TIMES DAILY
Qty: 30 TABLET | Refills: 11 | Status: SHIPPED | OUTPATIENT
Start: 2024-01-22 | End: 2024-02-28

## 2024-01-22 RX ORDER — ONDANSETRON 8 MG/1
8 TABLET, ORALLY DISINTEGRATING ORAL EVERY 8 HOURS PRN
Status: DISCONTINUED | OUTPATIENT
Start: 2024-01-22 | End: 2024-01-23 | Stop reason: HOSPADM

## 2024-01-22 RX ORDER — FUROSEMIDE 20 MG/1
40 TABLET ORAL 2 TIMES DAILY
Qty: 120 TABLET | Refills: 11 | Status: SHIPPED | OUTPATIENT
Start: 2024-01-22 | End: 2024-02-28

## 2024-01-22 RX ORDER — KETOROLAC TROMETHAMINE 10 MG/1
10 TABLET, FILM COATED ORAL ONCE
Status: COMPLETED | OUTPATIENT
Start: 2024-01-22 | End: 2024-01-22

## 2024-01-22 RX ADMIN — ONDANSETRON 4 MG: 4 TABLET, ORALLY DISINTEGRATING ORAL at 10:01

## 2024-01-22 RX ADMIN — CHLORDIAZEPOXIDE HYDROCHLORIDE 5 MG: 5 CAPSULE ORAL at 08:01

## 2024-01-22 RX ADMIN — ONDANSETRON 4 MG: 4 TABLET, ORALLY DISINTEGRATING ORAL at 06:01

## 2024-01-22 RX ADMIN — NICOTINE 1 PATCH: 21 PATCH, EXTENDED RELEASE TRANSDERMAL at 08:01

## 2024-01-22 RX ADMIN — SODIUM CHLORIDE 250 ML: 9 INJECTION, SOLUTION INTRAVENOUS at 04:01

## 2024-01-22 RX ADMIN — CHLORDIAZEPOXIDE HYDROCHLORIDE 5 MG: 5 CAPSULE ORAL at 09:01

## 2024-01-22 RX ADMIN — HEPARIN SODIUM 16 UNITS/KG/HR: 10000 INJECTION, SOLUTION INTRAVENOUS at 09:01

## 2024-01-22 RX ADMIN — FUROSEMIDE 40 MG: 10 INJECTION, SOLUTION INTRAMUSCULAR; INTRAVENOUS at 08:01

## 2024-01-22 RX ADMIN — KETOROLAC TROMETHAMINE 10 MG: 10 TABLET, FILM COATED ORAL at 04:01

## 2024-01-22 RX ADMIN — LOSARTAN POTASSIUM 25 MG: 25 TABLET, FILM COATED ORAL at 08:01

## 2024-01-22 RX ADMIN — DIPHENHYDRAMINE HYDROCHLORIDE 50 MG: 25 CAPSULE ORAL at 02:01

## 2024-01-22 RX ADMIN — HYDROCODONE BITARTRATE AND ACETAMINOPHEN 1 TABLET: 7.5; 325 TABLET ORAL at 10:01

## 2024-01-22 RX ADMIN — ASPIRIN 81 MG: 81 TABLET, COATED ORAL at 08:01

## 2024-01-22 RX ADMIN — POTASSIUM CHLORIDE 10 MEQ: 750 TABLET, EXTENDED RELEASE ORAL at 02:01

## 2024-01-22 NOTE — PROGRESS NOTES
[facility] Surg  Cardiology  Progress Note    Patient Name: Jaqueline Gutierrez  MRN: [id number]  Admission Date: 1/20/2024  Hospital Length of Stay: 2 days  Code Status: Full Code   Attending Physician: Sanjeev Ford MD   Primary Care Physician: Raul, Provider  Expected Discharge Date:   Principal Problem:New onset of congestive heart failure    Subjective:   HPI: Jaqueline Gutierrez is a 48 y.o. female with a PMH  has a past medical history of Substance abuse and Thyroid disease.  Presented to the ER for evaluation of shortness of breath which gradually worsened over last 4 days.  Patient recently seen by PCP on 1/17/24 for similar complaint and was discharged home with Zyrtec and Flonase.  Patient was recently diagnosed with viral illness that turned into bronchitis on 1/5/24 at .  Patient reports completing Omnicef, cough medicine and steroids. Reports she is still having a sore throat, ear pain, and a post nasal drip.  In addition, patient has been complaining of right calf pain/cramp without any injury or trauma noted.  Denies any fever, aches, chills, sweats, palpitations, headache, lightheadedness, dizziness, abdominal pain common bladder/bowel complaints, or any other symptoms.     Of note, patient states that she has a history of Hashimoto's thyroiditis and has not been on medication for over a year.  Patient states that she has been self treating with snorting methamphetamines.  Patient also reports occasional marijuana use.  Denies any other substance use.     ER workup revealed BNP of 3, 195, initial troponin of 1.551 with repeat troponin of 1.615, flu and COVID negative, lactic acid negative, chest x-ray revealing:  [Cardiomegaly with perihilar edema], ultrasound of right lower extremity revealed:[Positive superficial venous thrombosis within the right superficial saphenous vein.  Negative deep venous thrombosis].  All other lab work unremarkable.  EKG revealed sinus tachycardia with  left bundle-branch block with a ventricular rate of 101 beats per minute and a QT/QTC of 412/534.  Patient received 325 mg aspirin, 60 mg of Lasix, 1 mg of Ativan in ED. hospital Medicine consulted to admit patient for new onset CHF.  Patient in agreement with treatment plan.  Patient will be admitted under inpatient status.         CARDS CONSULT OBTAINED FOR CHF. SHE IS RESPONDING TO DIURETICS CLINICALLY [facility] PROGRESSIVE COUGH ORTHOPNEA SHORTNESS OF BREATH DECREASE INE XERCISE TOLERANCE HER ECHO SUGGEST A DEPRESSED EF.SHE IS DECOMPENSATED  ACUTE SYSTOLIC CHF. ON INITIAL EVAL SHE SEEMS COMFORTABLE LYING IN BED. HER EKG SHOWED NEW LBBB.  Hospital Course:   1/22/24 Pt seen and examined today, resting in bed. Feels much better, reports improvement in SOB, lying flat on exam. Labs reviewed, Crt 1.0, K 3.3 -3L for admission, Troponin peak 1.6 and trending down. Discussed in detail low EF and possible need for LifeVest pending R/LHC eval pt agreeable with plan.         Review of Systems   Constitutional: Negative.   HENT: Negative.     Eyes: Negative.    Cardiovascular: Negative.    Respiratory: Negative.     Skin: Negative.    Musculoskeletal: Negative.    Gastrointestinal: Negative.    Genitourinary: Negative.    Neurological: Negative.    Psychiatric/Behavioral: Negative.       Objective:     Vital Signs (Most Recent):  Temp: 98.3 °F (36.8 °C) (01/22/24 1028)  Pulse: 100 (01/22/24 1100)  Resp: 20 (01/22/24 1034)  BP: (!) 97/58 (01/22/24 1028)  SpO2: (!) 93 % (01/22/24 1028) Vital Signs (24h Range):  Temp:  [97.4 °F (36.3 °C)-98.3 °F (36.8 °C)] 98.3 °F (36.8 °C)  Pulse:  [] 100  Resp:  [16-20] 20  SpO2:  [89 %-98 %] 93 %  BP: ()/(53-66) 97/58     Weight: 68.5 kg (151 lb)  Body mass index is 23.65 kg/m².     SpO2: (!) 93 %         Intake/Output Summary (Last 24 hours) at 1/22/2024 1353  Last data filed at 1/22/2024 1221  Gross per 24 hour   Intake 843.36 ml   Output 1800 ml   Net -956.64 ml        Lines/Drains/Airways       Peripheral Intravenous Line  Duration                  Peripheral IV - Single Lumen 01/20/24 1646 20 G Left Antecubital 1 day                       Physical Exam  Vitals and nursing note reviewed.   Constitutional:       Appearance: Normal appearance.   HENT:      Head: Normocephalic.   Eyes:      Pupils: Pupils are equal, round, and reactive to light.   Cardiovascular:      Rate and Rhythm: Normal rate and regular rhythm.      Heart sounds: Normal heart sounds, S1 normal and S2 normal. No murmur heard.     No S3 or S4 sounds.   Pulmonary:      Effort: Pulmonary effort is normal.      Breath sounds: Normal breath sounds.   Abdominal:      General: Bowel sounds are normal.      Palpations: Abdomen is soft.   Musculoskeletal:         General: Normal range of motion.      Cervical back: Normal range of motion.   Skin:     Capillary Refill: Capillary refill takes less than 2 seconds.   Neurological:      General: No focal deficit present.      Mental Status: She is alert and oriented to person, place, and time.   Psychiatric:         Mood and Affect: Mood is anxious.         Behavior: Behavior normal.         Thought Content: Thought content normal.            Significant Labs: BMP:   Recent Labs   Lab 01/20/24  1646 01/20/24 2002 01/21/24 1100 01/22/24  0555   *  --  88 109     --  140 136   K 3.8  --  3.3* 3.3*     --  101 98   CO2 23  --  27 28   BUN 12  --  10 10   CREATININE 1.1  --  1.1 1.0   CALCIUM 8.7  --  8.9 8.8   MG  --  1.6  --   --    , CMP   Recent Labs   Lab 01/20/24 1646 01/21/24 1100 01/22/24  0555    140 136   K 3.8 3.3* 3.3*    101 98   CO2 23 27 28   * 88 109   BUN 12 10 10   CREATININE 1.1 1.1 1.0   CALCIUM 8.7 8.9 8.8   PROT 6.2  --   --    ALBUMIN 3.4*  --   --    BILITOT 0.5  --   --    ALKPHOS 106  --   --    AST 52*  --   --    ALT 61*  --   --    ANIONGAP 10 12 10   , CBC   Recent Labs   Lab 01/20/24 1646  "01/22/24  0555   WBC 5.31 5.77   HGB 12.0 14.6   HCT 35.0* 42.5    241   , INR No results for input(s): "INR", "PROTIME" in the last 48 hours., Lipid Panel   Recent Labs   Lab 01/22/24  0555   CHOL 215*   HDL 39*   LDLCALC 151.4   TRIG 123   CHOLHDL 18.1*   , Troponin   Recent Labs   Lab 01/20/24  1646 01/20/24  2002 01/20/24  2257   TROPONINI 1.551* 1.615* 1.510*   , and All pertinent lab results from the last 24 hours have been reviewed.    Significant Imaging: Echocardiogram: Transthoracic echo (TTE) complete (Cupid Only):   Results for orders placed or performed during the hospital encounter of 01/20/24   Echo   Result Value Ref Range    BSA 1.8 m2    LVOT stroke volume 33.54 cm3    LVIDd 6.99 (A) 3.5 - 6.0 cm    LV Systolic Volume 225.16 mL    LV Systolic Volume Index 125.8 mL/m2    LVIDs 6.62 (A) 2.1 - 4.0 cm    LV Diastolic Volume 254.73 mL    LV Diastolic Volume Index 142.31 mL/m2    IVS 1.20 (A) 0.6 - 1.1 cm    LVOT diameter 1.98 cm    LVOT area 3.1 cm2    FS 5 (A) 28 - 44 %    Left Ventricle Relative Wall Thickness 0.36 cm    Posterior Wall 1.25 (A) 0.6 - 1.1 cm    LV mass 416.35 g    LV Mass Index 233 g/m2    MV Peak E Luis 1.44 m/s    TDI LATERAL 0.12 m/s    TDI SEPTAL 0.11 m/s    E/E' ratio 12.52 m/s    TR Max Luis 2.36 m/s    IVRT 38.06 msec    LV SEPTAL E/E' RATIO 13.09 m/s    LV LATERAL E/E' RATIO 12.00 m/s    LVOT peak luis 0.78 m/s    Left Ventricular Outflow Tract Mean Velocity 0.63 cm/s    Left Ventricular Outflow Tract Mean Gradient 1.71 mmHg    RVDD 2.89 cm    RVOT peak VTI 9.0 cm    TAPSE 1.69 cm    LA size 4.71 cm    Left Atrium Minor Axis 5.90 cm    Left Atrium Major Axis 5.92 cm    RA Major Axis 4.20 cm    AV mean gradient 5 mmHg    AV peak gradient 7 mmHg    Ao peak luis 1.35 m/s    Ao VTI 19.60 cm    LVOT peak VTI 10.90 cm    AV valve area 1.71 cm²    AV Velocity Ratio 0.58     AV index (prosthetic) 0.56     [place]RI[place] by Velocity Ratio 1.78 cm²    Mr max luis 4.33 m/s    MV stenosis pressure " 1/2 time 25.51 ms    MV valve area p 1/2 method 8.62 cm2    TV mean gradient 22 mmHg    Triscuspid Valve Regurgitation Peak Gradient 22 mmHg    PV mean gradient 1 mmHg    RVOT peak rodney 0.61 m/s    Ao root annulus 2.89 cm    STJ 2.96 cm    Ascending aorta 2.87 cm    IVC diameter 1.42 cm    Mean e' 0.12 m/s    ZLVIDS 6.10     ZLVIDD 3.45     LA Volume Index 54.2 mL/m2    LA volume 97.01 cm3    LA WIDTH 4.1 cm    RA Width 3.6 cm    EF 15 %    TV resting pulmonary artery pressure 30 mmHg    RV TB RVSP 10 mmHg    Est. RA pres 8 mmHg    Narrative      Left Ventricle: The left ventricle is severely dilated. Mildly   increased wall thickness. There is mild concentric hypertrophy. Regional   wall motion abnormalities present. Septal motion is consistent with bundle   branch block. There is severely reduced systolic function with a visually   estimated ejection fraction of less than 30%. Ejection fraction by visual   approximation is 15%. There is normal diastolic function.    Right Ventricle: Normal right ventricular cavity size. Wall thickness   is normal. Right ventricle wall motion  is normal. Systolic function is   normal.    Left Atrium: Left atrium is severely dilated.    Aortic Valve: The aortic valve is a trileaflet valve. There is mild   stenosis. Aortic valve area by VTI is 1.71 cm². Aortic valve peak velocity   is 1.35 m/s. Mean gradient is 5 mmHg. The dimensionless index is 0.56.    Mitral Valve: There is mild to moderate regurgitation.    IVC/SVC: Intermediate venous pressure at 8 mmHg.    Pericardium: There is a trivial circumferential effusion. No indication   of cardiac tamponade.     , EKG: reviewed, Stress Test: reviewed, and X-Ray: CXR: X-Ray Chest 1 View (CXR):   Results for orders placed or performed during the hospital encounter of 01/20/24   X-Ray Chest 1 View    Narrative    EXAMINATION:  XR CHEST 1 VIEW    CLINICAL HISTORY:  shortness of breath;    TECHNIQUE:  Single frontal view of the chest was  performed.    COMPARISON:  None    FINDINGS:  Cardiomegaly with perihilar edema.  Correlate clinically for CHF.    Bones are intact.  Nipple markers are seen.      Impression    As above      Electronically signed by: Richy Almonte  Date:    01/20/2024  Time:    16:24     Assessment and Plan:       * New onset of congestive heart failure    Recent Labs   Lab 01/20/24  1646   BNP 3,195*     HAS ACIUTE DECOMPENSATED SYSTOLIC CHF WILL DIURESE AND OPTIMIZE MEDICAL THERAPY AS BP ALLOWS. FLUID RESTRICTION LOW SALT DIET SMOKING CESSATION DISCUSSED.  HAS NEW ONSET LBBB THIS REQUIRES AN ISCHEMIC EVAL ONCE EUVOLEMIC DUE TO HER RF AND FH I THINK R/LHC WOULD BE IDEAL.    1/22/24  Echo with EF 15%  Diuresed well, -3L for admission, resp status improved  RLHC planned for today with Dr. Menard, All risks, benefits and treatment alternatives explained. All questions answered. Pt agreeable to proceed  Cont hep gtt, ASA, BB, lasix, ARB    Elevated troponin  SECONDARY TO CHF DEMAND ISCHEMIA SHE WILL NEED R/LHC WHEN EUVOLEMIC.    1/22/24  R/LHC planned for today  Keep NPO    LBBB (left bundle branch block)  R/LHC planned for today    Tobacco use  SMOKING CESSATION.    Substance abuse  cessation    Venous embolism and thrombosis of superficial vessels of lower extremity  PER PRIMARY TEAM        VTE Risk Mitigation (From admission, onward)           Ordered     heparin 25,000 units in dextrose 5% (100 units/ml) IV bolus from bag - ADDITIONAL PRN BOLUS - 60 units/kg (max bolus 4000 units)  As needed (PRN)        Question:  Heparin Infusion Adjustment (DO NOT MODIFY ANSWER)  Answer:  [email or web address]\epic\Images\Pharmacy\HeparinInfusions\heparin LOW INTENSITY nomogram for [facility] [id number].pdf    01/21/24 0845     heparin 25,000 units in dextrose 5% (100 units/ml) IV bolus from bag - ADDITIONAL PRN BOLUS - 30 units/kg (max bolus 4000 units)  As needed (PRN)        Question:  Heparin Infusion Adjustment (DO NOT MODIFY ANSWER)  Answer:   \\[email or web address]\epic\Images\Pharmacy\HeparinInfusions\heparin LOW INTENSITY nomogram for [facility] [id number].pdf    01/21/24 0845     heparin 25,000 units in dextrose 5% 250 mL (100 units/mL) infusion LOW INTENSITY nomogram - [facility]  Continuous        Question:  Begin at (units/kg/hr)  Answer:  12    01/21/24 0845     Place MARVIN hose  Until discontinued         01/20/24 1917     IP VTE LOW RISK PATIENT  Once         01/20/24 1917                    Shira Manriquez, NP  Cardiology  [facility] - Med Surg

## 2024-01-22 NOTE — PLAN OF CARE
Discussed poc with pt, pt verbalized understanding     Pt went for heart cath today. Dressing monitored for bleeding. CDI currently    VS wnl  Cardiac monitoring in use, pt is NSR to ST  Fall precautions in place, remains injury free  Pain, nausea, and anxiety under control with PRN meds     Accurate I&Os. Fluids restricted  Bed locked at lowest position  Call light within reach     Chart check complete  Will cont with POC

## 2024-01-22 NOTE — NURSING
patient is still complaining of pain when she takes a deep breath even after the Norco that was given at 2103, her BP is now 91/59 (after the Lasix, norco and metoprolol was given tonight), the patient stated that her pain was better when she was lying on her right side so I encouraged her to do that. Informed provider, lidocaine patch ordered.

## 2024-01-22 NOTE — DISCHARGE SUMMARY
[facility] - Cath Lab ([facility] Medicine  Discharge Summary      Patient Name: Jaqueline Gutierrez  MRN: [id number]  [facility]: [id number]  Patient Class: IP- Inpatient  Admission Date: 1/20/2024  Hospital Length of Stay: 2 days  Discharge Date and Time:  01/22/2024 5:08 PM  Attending Physician: Sanjeev Ford MD   Discharging Provider: Sanjeev Ford MD  Primary Care Provider: Jennifer Carter    Primary Care Team: [facility] MEDICINE B    HPI:   Jaqueline Gutierrez is a 48 y.o. female with a PMH  has a past medical history of Substance abuse and Thyroid disease.  Presented to the ER for evaluation of shortness of breath which gradually worsened over last 4 days.  Patient recently seen by PCP on 1/17/24 for similar complaint and was discharged home with Zyrtec and Flonase.  Patient was recently diagnosed with viral illness that turned into bronchitis on 1/5/24 at .  Patient reports completing Omnicef, cough medicine and steroids. Reports she is still having a sore throat, ear pain, and a post nasal drip.  In addition, patient has been complaining of right calf pain/cramp without any injury or trauma noted.  Denies any fever, aches, chills, sweats, palpitations, headache, lightheadedness, dizziness, abdominal pain common bladder/bowel complaints, or any other symptoms.    Of note, patient states that she has a history of Hashimoto's thyroiditis and has not been on medication for over a year.  Patient states that she has been self treating with snorting methamphetamines.  Patient also reports occasional marijuana use.  Denies any other substance use.    ER workup revealed BNP of 3, 195, initial troponin of 1.551 with repeat troponin of 1.615, flu and COVID negative, lactic acid negative, chest x-ray revealing:  [Cardiomegaly with perihilar edema], ultrasound of right lower extremity revealed:[Positive superficial venous thrombosis within the right superficial saphenous vein.  Negative deep venous  thrombosis].  All other lab work unremarkable.  EKG revealed sinus tachycardia with left bundle-branch block with a ventricular rate of 101 beats per minute and a QT/QTC of 412/534.  Patient received 325 mg aspirin, 60 mg of Lasix, 1 mg of Ativan in ED. hospital Medicine consulted to admit patient for new onset CHF.  Patient in agreement with treatment plan.  Patient will be admitted under inpatient status.       PCP: Raul, Provider      Procedure(s) (LRB):  CATHETERIZATION, HEART, BOTH LEFT AND RIGHT (N/A)      Hospital Course:   A 48-year-old woman with a history of alcohol abuse-induced cardiomyopathy was admitted for new onset acute heart failure and congestive heart failure, characterized by symptoms like progressive cough, orthopnea, and decreased exercise tolerance. On January 21, 2024, she reported severe right-sided chest pain and was initially uncooperative with morning lab work, insisting on IV Dilaudid for pain relief before relenting. Post-administration, she rested comfortably but remained minimally cooperative. Her treatment included Furosemide for fluid control, a strict fluid restriction of 1.5 liters, telemetry, and close monitoring with daily weights and input-output checks. Cardiology was consulted, and patient education on self-care and diet was ongoing.    An echocardiogram revealed a depressed ejection fraction, indicating decompensated acute systolic congestive heart failure. Despite no edema and overall euvolemia, she received 60 mg IV Lasix in the ER, followed by a scheduled 40 mg every 12 hours. Elevated troponins and chest pain led to the initiation of a heparin drip, and ongoing monitoring of electrolytes and urine output was ordered.    By January 22, the patient showed improvement, with reduced shortness of breath and declining troponin levels. A heart catheterization revealed an occluded mid-right coronary artery  and a significantly reduced EF of 25%. The cardiologist  "recommended medical treatment and a life vest, attributing the cardiac myopathy to alcohol use. Preparations for discharge included arranging a life vest and sending medications to the bedside via [facility].    BP (!) 91/58   Pulse 88   Temp 98.1 °F (36.7 °C)   Resp (!) 22   Ht 5' 7" (1.702 m)   Wt 68.5 kg (151 lb)   LMP  (LMP Unknown)   SpO2 99%   Breastfeeding No   BMI 23.65 kg/m²     PHYSICAL EXAM  Vitals Reviewed  GEN: No acute distress, pleasant, body habitus normal  HEENT: atraumatic and normocephalic  CARDS: regular rate and rhythm, no m/g, pulses palpable in LE  PULM: breathing comfortably on room air, chest symmetric, nonlabored, no abnormal breath sounds on auscultation  ABD: nontender, nondistended, soft, no organomegaly, BS+  Neuro: Alert and oriented x3, CN's I-IX grossly intact, sensation and motor intact; follows directions and answers questions appropriately       Goals of Care Treatment Preferences:  Code Status: Full Code      Consults:   Consults (From admission, onward)          Status Ordering Provider     Inpatient consult to Social Work/Case Management  Once        Provider:  (Not yet assigned)    Ordered SAM WHITAKER     Inpatient consult to Cardiology  Once        Provider:  Gerri Mcnulty MD    Completed ALVARADO HEARD     Inpatient consult to Social Work/Case Management  Once        Provider:  (Not yet assigned)    Completed ALVARADO HEARD     Inpatient consult to Registered Dietitian/Nutritionist  Once        Provider:  (Not yet assigned)    Completed ALVARADO HEARD            No new Assessment & Plan notes have been filed under this hospital service since the last note was generated.  Service: Hospital Medicine    Final Active Diagnoses:    Diagnosis Date Noted POA    PRINCIPAL PROBLEM:  New onset of congestive heart failure [I50.9] 01/21/2024 Yes    Venous embolism and thrombosis of superficial vessels of lower extremity [I82.819] 01/21/2024 Yes    " Substance abuse [F19.10] 01/21/2024 Yes    Tobacco use [Z72.0] 01/21/2024 Yes    LBBB (left bundle branch block) [I44.7] 01/21/2024 Yes    Elevated troponin [R79.89] 01/21/2024 Yes      Problems Resolved During this Admission:       Discharged Condition: good    Disposition: Home or Self Care    Follow Up:   Follow-up Information       Henry Menard MD Follow up.    Specialties: Interventional Cardiology, Cardiology  Contact information:  [address]  [place] [address]  [phone]               [employer], Provider. Schedule an appointment as soon as possible for a visit.                           Patient Instructions:      Ambulatory referral/consult to [facility]   Standing Status: Future   Referral Priority: Routine Referral Type: Consultation   Referral Reason: Specialty Services Required   Requested Specialty: Cardiology   Number of Visits Requested: 1     Ambulatory referral/consult to Family Practice   Standing Status: Future   Referral Priority: Routine Referral Type: Consultation   Referral Reason: Specialty Services Required   Requested Specialty: Family Medicine   Number of Visits Requested: 1     Call MD for:  temperature >100.4     Call MD for:  persistent nausea and vomiting or diarrhea     Call MD for:  severe uncontrolled pain     Call MD for:  redness, tenderness, or signs of infection (pain, swelling, redness, odor or green/yellow discharge around incision site)     Call MD for:  difficulty breathing or increased cough     Call MD for:  severe persistent headache     Call MD for:  worsening rash     Call MD for:  persistent dizziness, light-headedness, or visual disturbances     Call MD for:  increased confusion or weakness       Significant Diagnostic Studies: BMP:   Recent Labs   Lab 01/20/24  2002 01/21/24  1100 01/22/24  0555   GLU  --    < > 109   NA  --    < > 136   K  --    < > 3.3*   CL  --    < > 98   CO2  --    < > 28   BUN  --    < > 10  "  CREATININE  --    < > 1.0   CALCIUM  --    < > 8.8   MG 1.6  --   --     < > = values in this interval not displayed.     CBC:   Recent Labs   Lab 01/22/24  0555   WBC 5.77   HGB 14.6   HCT 42.5        CMP:   Recent Labs   Lab 01/21/24  1100 01/22/24  0555    136   K 3.3* 3.3*    98   CO2 27 28   GLU 88 109   BUN 10 10   CREATININE 1.1 1.0   CALCIUM 8.9 8.8   ANIONGAP 12 10     Cardiac Markers: No results for input(s): "CKMB", "MYOGLOBIN", "BNP", "TROPISTAT" in the last 48 hours.  Coagulation:   Recent Labs   Lab 01/22/24  1452   APTT 26.4     Lactic Acid:   Recent Labs   Lab 01/20/24  1822   LACTATE 0.9     Magnesium:   Recent Labs   Lab 01/20/24 2002   MG 1.6     Troponin:   Recent Labs   Lab 01/20/24 2002 01/20/24  2257   TROPONINI 1.615* 1.510*     TSH:   Recent Labs   Lab 01/21/24  1101   TSH 0.904     Urine Studies:   No results for input(s): "COLORU", "APPEARANCEUA", "PHUR", "SPECGRAV", "PROTEINUA", "GLUCUA", "KETONESU", "BILIRUBINUA", "OCCULTUA", "NITRITE", "UROBILINOGEN", "LEUKOCYTESUR", "RBCUA", "WBCUA", "BACTERIA", "SQUAMEPITHEL", "HYALINECASTS" in the last 48 hours.    Invalid input(s): "WRIGHTSUR"      Pending Diagnostic Studies:       Procedure Component Value Units Date/Time    Echo [[id number]]     Order Status: Sent Lab Status: No result            Medications:  Reconciled Home Medications:      Medication List        START taking these medications      aspirin 81 MG EC tablet  Commonly known as: ECOTRIN  Take 1 tablet (81 mg total) by mouth once daily.  Start taking on: January 23, 2024     furosemide 20 MG tablet  Commonly known as: LASIX  Take 2 tablets (40 mg total) by mouth 2 (two) times daily.     losartan 25 MG tablet  Commonly known as: COZAAR  Take 1 tablet (25 mg total) by mouth once daily.  Start taking on: January 23, 2024     metoprolol tartrate 25 MG tablet  Commonly known as: LOPRESSOR  Take 0.5 tablets (12.5 mg total) by mouth 2 (two) times daily.   "   nitroGLYCERIN 0.4 MG SL tablet  Commonly known as: NITROSTAT  Place 1 tablet (0.4 mg total) under the tongue every 5 (five) minutes as needed for Chest pain.            CONTINUE taking these medications      albuterol 90 mcg/actuation inhaler  Commonly known as: PROVENTIL/VENTOLIN HFA  Inhale 2 puffs into the lungs every 4 (four) hours as needed.     cetirizine 10 MG tablet  Commonly known as: ZYRTEC  Take 1 tablet by mouth every morning.     fluticasone propionate 50 mcg/actuation nasal spray  Commonly known as: FLONASE  1 spray by Each Nostril route every morning.     levothyroxine 175 MCG tablet  Commonly known as: SYNTHROID, LEVOTHROID  Take 1 tablet (175 mcg total) by mouth once daily.     omeprazole 20 MG capsule  Commonly known as: PRILOSEC  Take 20 mg by mouth once daily.              Indwelling Lines/Drains at time of discharge:   Lines/Drains/Airways       None                   Time spent on the discharge of patient: 25 minutes  of time spent on discharge including examining patient, providing discharge instructions, arranging follow-up and documentation.           Sanjeev Ford MD  Department of Hospital Medicine  [facility] - Cath Lab ([facility])

## 2024-01-22 NOTE — ASSESSMENT & PLAN NOTE
SECONDARY TO CHF DEMAND ISCHEMIA SHE WILL NEED R/LHC WHEN EUVOLEMIC.    1/22/24  R/LHC planned for today  Keep NPO

## 2024-01-22 NOTE — PLAN OF CARE
Patient resting, no distress noted, patient more calm now than earlier, patient on tele [id number] SR, patient on heparin drip see MAR, fall precautions in place, bed alarm on, chart check completed

## 2024-01-22 NOTE — BRIEF OP NOTE
<[facility] - Cath Lab ([facility])  Cardiology Department  Operative Note    SUMMARY     Date of Procedure: 1/22/2024     Procedure: Procedure(s) (LRB):  CATHETERIZATION, HEART, BOTH LEFT AND RIGHT (N/A)     Surgeon(s) and Role:     * Henry Menard MD - Primary    Assisting Surgeon: None    Pre-Operative Diagnosis: New onset of congestive heart failure [I50.9]  Elevated troponin [R79.89]    Post-Operative Diagnosis: Post-Op Diagnosis Codes:     * New onset of congestive heart failure [I50.9]     * Elevated troponin [R79.89]    Anesthesia: RN IV Sedation    Technical Procedures Used: LHC/RHC, Dx: CMY/CHF    Description of the Findings of the Procedure: occluded mid RCA with left to right collaterals, EF=25%, recommend medical tx, life vest. CMY probably from etoh use    Significant Surgical Tasks Conducted by the Assistant(s), if Applicable: none    Complications: No    Estimated Blood Loss (EBL): < 50 cc           Implants: * No implants in log *    Specimens:   Specimen (24h ago, onward)      None                    Condition: Good    Disposition: PACU - hemodynamically stable.    Attestation: I was present and scrubbed for the entire procedure.

## 2024-01-22 NOTE — CONSULTS
"                    Food & Nutrition Education    Diet Education: Heart Failure  Time Spent: 15 minutes.    Learners: Pt    Nutrition Education provided with handouts:  Healthy-Heart Nutrition Therapy, Fluid-Restricted Diet, Low-Sodium Nutrition Therapy ([email or web address])    Past Medical History:   Diagnosis Date    Elevated troponin 01/21/2024    Substance abuse     Thyroid disease      Patient Active Problem List   Diagnosis    Anxiety reaction    New onset of congestive heart failure    Venous embolism and thrombosis of superficial vessels of lower extremity    Substance abuse    Tobacco use    LBBB (left bundle branch block)    Elevated troponin       Comments:  Dietitian educated patient on low sodium diet and fluid restriction related to hospital diagnosis. Discussed the importance of limiting sodium to 2,000 mg per day and reading food labels to avoid further complications of CHF. Discussed using salt free seasonings (Mrs. Lowell and [employer] "No Salt") and other herbs and spices in meals to enhance flavor without additional sodium. Discussed 1500 ml fluid restriction per MD and dietary sources of fluid. Dietitian recommended using a cup with measurements for fluids and to try to consume small sips spread throughout the day rather than a lot at one time.    The pt remained minimally engaged throughout entire nutrition education. The pt is has new onset of congestive heart failure and has not followed fluid restriction prior to current admission. The pt states she understands the information presented and will begin accounting for all sources of fluids/sodium throughout the day. The pt was in pain and had no further questions. Pt and bedside visitor reports pt with decreased appetite since near [place] d/t consistent acid reflux. Pt currently with % PO intake. Dietitian will continue to follow pt during current admission and monitor PO intake.     Wt Readings from Last 20 Encounters: "   01/21/24 68.5 kg (151 lb)   12/30/23 70.8 kg (155 lb 15.6 oz)   07/17/21 78.9 kg (173 lb 15.1 oz)   11/20/19 77.8 kg (171 lb 8.3 oz)   09/18/17 65.8 kg (145 lb)   09/17/17 65.8 kg (145 lb)   12/02/15 67.1 kg (148 lb)   07/06/15 64.4 kg (142 lb)   ]  NFPE not performed, Dietitian will assess the need for NFPE during follow up.    All questions and concerns answered.    Provided handout with dietitian's contact information.    *Please re-consult as needed.    Thank You!   Bertin Jiang, Registration Eligible, Provisional LDN

## 2024-01-22 NOTE — SUBJECTIVE & OBJECTIVE
Review of Systems   Constitutional: Negative.   HENT: Negative.     Eyes: Negative.    Cardiovascular: Negative.    Respiratory: Negative.     Skin: Negative.    Musculoskeletal: Negative.    Gastrointestinal: Negative.    Genitourinary: Negative.    Neurological: Negative.    Psychiatric/Behavioral: Negative.       Objective:     Vital Signs (Most Recent):  Temp: 98.3 °F (36.8 °C) (01/22/24 1028)  Pulse: 100 (01/22/24 1100)  Resp: 20 (01/22/24 1034)  BP: (!) 97/58 (01/22/24 1028)  SpO2: (!) 93 % (01/22/24 1028) Vital Signs (24h Range):  Temp:  [97.4 °F (36.3 °C)-98.3 °F (36.8 °C)] 98.3 °F (36.8 °C)  Pulse:  [] 100  Resp:  [16-20] 20  SpO2:  [89 %-98 %] 93 %  BP: ()/(53-66) 97/58     Weight: 68.5 kg (151 lb)  Body mass index is 23.65 kg/m².     SpO2: (!) 93 %         Intake/Output Summary (Last 24 hours) at 1/22/2024 1353  Last data filed at 1/22/2024 1221  Gross per 24 hour   Intake 843.36 ml   Output 1800 ml   Net -956.64 ml       Lines/Drains/Airways       Peripheral Intravenous Line  Duration                  Peripheral IV - Single Lumen 01/20/24 1646 20 G Left Antecubital 1 day                       Physical Exam  Vitals and nursing note reviewed.   Constitutional:       Appearance: Normal appearance.   HENT:      Head: Normocephalic.   Eyes:      Pupils: Pupils are equal, round, and reactive to light.   Cardiovascular:      Rate and Rhythm: Normal rate and regular rhythm.      Heart sounds: Normal heart sounds, S1 normal and S2 normal. No murmur heard.     No S3 or S4 sounds.   Pulmonary:      Effort: Pulmonary effort is normal.      Breath sounds: Normal breath sounds.   Abdominal:      General: Bowel sounds are normal.      Palpations: Abdomen is soft.   Musculoskeletal:         General: Normal range of motion.      Cervical back: Normal range of motion.   Skin:     Capillary Refill: Capillary refill takes less than 2 seconds.   Neurological:      General: No focal deficit present.       "Mental Status: She is alert and oriented to person, place, and time.   Psychiatric:         Mood and Affect: Mood is anxious.         Behavior: Behavior normal.         Thought Content: Thought content normal.            Significant Labs: BMP:   Recent Labs   Lab 01/20/24 1646 01/20/24 2002 01/21/24 1100 01/22/24  0555   *  --  88 109     --  140 136   K 3.8  --  3.3* 3.3*     --  101 98   CO2 23  --  27 28   BUN 12  --  10 10   CREATININE 1.1  --  1.1 1.0   CALCIUM 8.7  --  8.9 8.8   MG  --  1.6  --   --    , CMP   Recent Labs   Lab 01/20/24 1646 01/21/24 1100 01/22/24  0555    140 136   K 3.8 3.3* 3.3*    101 98   CO2 23 27 28   * 88 109   BUN 12 10 10   CREATININE 1.1 1.1 1.0   CALCIUM 8.7 8.9 8.8   PROT 6.2  --   --    ALBUMIN 3.4*  --   --    BILITOT 0.5  --   --    ALKPHOS 106  --   --    AST 52*  --   --    ALT 61*  --   --    ANIONGAP 10 12 10   , CBC   Recent Labs   Lab 01/20/24 1646 01/22/24  0555   WBC 5.31 5.77   HGB 12.0 14.6   HCT 35.0* 42.5    241   , INR No results for input(s): "INR", "PROTIME" in the last 48 hours., Lipid Panel   Recent Labs   Lab 01/22/24  0555   CHOL 215*   HDL 39*   LDLCALC 151.4   TRIG 123   CHOLHDL 18.1*   , Troponin   Recent Labs   Lab 01/20/24 1646 01/20/24 2002 01/20/24  2257   TROPONINI 1.551* 1.615* 1.510*   , and All pertinent lab results from the last 24 hours have been reviewed.    Significant Imaging: Echocardiogram: Transthoracic echo (TTE) complete ([facility] Only):   Results for orders placed or performed during the hospital encounter of 01/20/24   Echo   Result Value Ref Range    BSA 1.8 m2    LVOT stroke volume 33.54 cm3    LVIDd 6.99 (A) 3.5 - 6.0 cm    LV Systolic Volume 225.16 mL    LV Systolic Volume Index 125.8 mL/m2    LVIDs 6.62 (A) 2.1 - 4.0 cm    LV Diastolic Volume 254.73 mL    LV Diastolic Volume Index 142.31 mL/m2    IVS 1.20 (A) 0.6 - 1.1 cm    LVOT diameter 1.98 cm    LVOT area 3.1 cm2    FS 5 (A) 28 " - 44 %    Left Ventricle Relative Wall Thickness 0.36 cm    Posterior Wall 1.25 (A) 0.6 - 1.1 cm    LV mass 416.35 g    LV Mass Index 233 g/m2    MV Peak E Luis 1.44 m/s    TDI LATERAL 0.12 m/s    TDI SEPTAL 0.11 m/s    E/E' ratio 12.52 m/s    TR Max Luis 2.36 m/s    IVRT 38.06 msec    LV SEPTAL E/E' RATIO 13.09 m/s    LV LATERAL E/E' RATIO 12.00 m/s    LVOT peak luis 0.78 m/s    Left Ventricular Outflow Tract Mean Velocity 0.63 cm/s    Left Ventricular Outflow Tract Mean Gradient 1.71 mmHg    RVDD 2.89 cm    RVOT peak VTI 9.0 cm    TAPSE 1.69 cm    LA size 4.71 cm    Left Atrium Minor Axis 5.90 cm    Left Atrium Major Axis 5.92 cm    RA Major Axis 4.20 cm    AV mean gradient 5 mmHg    AV peak gradient 7 mmHg    Ao peak luis 1.35 m/s    Ao VTI 19.60 cm    LVOT peak VTI 10.90 cm    AV valve area 1.71 cm²    AV Velocity Ratio 0.58     AV index (prosthetic) 0.56     [place] by Velocity Ratio 1.78 cm²    Mr max luis 4.33 m/s    MV stenosis pressure 1/2 time 25.51 ms    MV valve area p 1/2 method 8.62 cm2    TV mean gradient 22 mmHg    Triscuspid Valve Regurgitation Peak Gradient 22 mmHg    PV mean gradient 1 mmHg    RVOT peak luis 0.61 m/s    Ao root annulus 2.89 cm    STJ 2.96 cm    Ascending aorta 2.87 cm    IVC diameter 1.42 cm    Mean e' 0.12 m/s    ZLVIDS 6.10     ZLVIDD 3.45     LA Volume Index 54.2 mL/m2    LA volume 97.01 cm3    LA WIDTH 4.1 cm    RA Width 3.6 cm    EF 15 %    TV resting pulmonary artery pressure 30 mmHg    RV TB RVSP 10 mmHg    Est. RA pres 8 mmHg    Narrative      Left Ventricle: The left ventricle is severely dilated. Mildly   increased wall thickness. There is mild concentric hypertrophy. Regional   wall motion abnormalities present. Septal motion is consistent with bundle   branch block. There is severely reduced systolic function with a visually   estimated ejection fraction of less than 30%. Ejection fraction by visual   approximation is 15%. There is normal diastolic function.    Right Ventricle:  Normal right ventricular cavity size. Wall thickness   is normal. Right ventricle wall motion  is normal. Systolic function is   normal.    Left Atrium: Left atrium is severely dilated.    Aortic Valve: The aortic valve is a trileaflet valve. There is mild   stenosis. Aortic valve area by VTI is 1.71 cm². Aortic valve peak velocity   is 1.35 m/s. Mean gradient is 5 mmHg. The dimensionless index is 0.56.    Mitral Valve: There is mild to moderate regurgitation.    IVC/SVC: Intermediate venous pressure at 8 mmHg.    Pericardium: There is a trivial circumferential effusion. No indication   of cardiac tamponade.     , EKG: reviewed, Stress Test: reviewed, and X-Ray: CXR: X-Ray Chest 1 View (CXR):   Results for orders placed or performed during the hospital encounter of 01/20/24   X-Ray Chest 1 View    Narrative    EXAMINATION:  XR CHEST 1 VIEW    CLINICAL HISTORY:  shortness of breath;    TECHNIQUE:  Single frontal view of the chest was performed.    COMPARISON:  None    FINDINGS:  Cardiomegaly with perihilar edema.  Correlate clinically for CHF.    Bones are intact.  Nipple markers are seen.      Impression    As above      Electronically signed by: Richy Almonte  Date:    01/20/2024  Time:    16:24

## 2024-01-22 NOTE — HOSPITAL COURSE
1/22/24 Pt seen and examined today, resting in bed. Feels much better, reports improvement in SOB, lying flat on exam. Labs reviewed, Crt 1.0, K 3.3 -3L for admission, Troponin peak 1.6 and trending down. Discussed in detail low EF and possible need for LifeVest pending R/LHC eval pt agreeable with plan.     1/23/24 Pt seen and examined today, resting in bed. Reports some tenderness near LHC site. LH with occluded RCA with left to right collaterals, nml filling pressures. Labs reviewed, Crt 1.1, -3L for admission. BP tenuous this morning BB held. Lifevest approved

## 2024-01-22 NOTE — PLAN OF CARE
[facility] - Cath Lab (Hospital)  Discharge Final Note    Primary Care Provider: Jennifer Carter    Expected Discharge Date: 1/22/2024    Final Discharge Note (most recent)       Final Note - 01/22/24 1556          Final Note    Assessment Type Final Discharge Note     Anticipated Discharge Disposition Home or Self Care        Post-Acute Status    Post-Acute Authorization HME     E Status Pending payor review     Discharge Delays None known at this time                     Contact Info       Henry Menard MD   Specialty: Interventional Cardiology, Cardiology    [address]  [place] [address]   Phone: [phone]       Next Steps: Follow up    Jennifer Carter   Relationship: PCP - General        Next Steps: Schedule an appointment as soon as possible for a visit          Plan for pt to d/c today pending [employer] lifevest being approved and fitting complete. Referral faxed to Marlon with [facility].

## 2024-01-22 NOTE — ASSESSMENT & PLAN NOTE
Recent Labs   Lab 01/20/24  1646   BNP 3,195*     HAS ACIUTE DECOMPENSATED SYSTOLIC CHF WILL DIURESE AND OPTIMIZE MEDICAL THERAPY AS BP ALLOWS. FLUID RESTRICTION LOW SALT DIET SMOKING CESSATION DISCUSSED.  HAS NEW ONSET LBBB THIS REQUIRES AN ISCHEMIC EVAL ONCE EUVOLEMIC DUE TO HER RF AND FH I THINK R/LHC WOULD BE IDEAL.    1/22/24  Echo with EF 15%  Diuresed well, -3L for admission, resp status improved  RLHC planned for today with Dr. Menard, All risks, benefits and treatment alternatives explained. All questions answered. Pt agreeable to proceed  Cont hep gtt, ASA, BB, lasix, ARB

## 2024-01-22 NOTE — DISCHARGE INSTRUCTIONS
Right groin dressing remains c/d/I and site wnl.  Transferred back to room 506, via hospital bed, in no apparent distress.  Dressing remains c/d/I at time of transfer.  Family at bedside at time of transfer.

## 2024-01-22 NOTE — HOSPITAL COURSE
"A 48-year-old woman with a history of alcohol abuse-induced cardiomyopathy was admitted for new onset acute heart failure and congestive heart failure, characterized by symptoms like progressive cough, orthopnea, and decreased exercise tolerance. On January 21, 2024, she reported severe right-sided chest pain and was initially uncooperative with morning lab work, insisting on IV Dilaudid for pain relief before relenting. Post-administration, she rested comfortably but remained minimally cooperative. Her treatment included Furosemide for fluid control, a strict fluid restriction of 1.5 liters, telemetry, and close monitoring with daily weights and input-output checks. Cardiology was consulted, and patient education on self-care and diet was ongoing.    An echocardiogram revealed a depressed ejection fraction, indicating decompensated acute systolic congestive heart failure. Despite no edema and overall euvolemia, she received 60 mg IV Lasix in the ER, followed by a scheduled 40 mg every 12 hours. Elevated troponins and chest pain led to the initiation of a heparin drip, and ongoing monitoring of electrolytes and urine output was ordered.    By January 22, the patient showed improvement, with reduced shortness of breath and declining troponin levels. A heart catheterization revealed an occluded mid-right coronary artery  and a significantly reduced EF of 25%. The cardiologist recommended medical treatment and a life vest, attributing the cardiac myopathy to alcohol use. Preparations for discharge included arranging a life vest and sending medications to the bedside via [facility].    BP (!) 91/58   Pulse 88   Temp 98.1 °F (36.7 °C)   Resp (!) 22   Ht 5' 7" (1.702 m)   Wt 68.5 kg (151 lb)   LMP  (LMP Unknown)   SpO2 99%   Breastfeeding No   BMI 23.65 kg/m²     PHYSICAL EXAM  Vitals Reviewed  GEN: No acute distress, pleasant, body habitus normal  HEENT: atraumatic and normocephalic  CARDS: regular rate and " rhythm, no m/g, pulses palpable in LE  PULM: breathing comfortably on room air, chest symmetric, nonlabored, no abnormal breath sounds on auscultation  ABD: nontender, nondistended, soft, no organomegaly, BS+  Neuro: Alert and oriented x3, CN's I-IX grossly intact, sensation and motor intact; follows directions and answers questions appropriately

## 2024-01-23 ENCOUNTER — DOCUMENTATION ONLY (OUTPATIENT)
Dept: CARDIOLOGY | Facility: CLINIC | Age: 49
End: 2024-01-23
Payer: MEDICAID

## 2024-01-23 ENCOUNTER — CLINICAL SUPPORT (OUTPATIENT)
Dept: SMOKING CESSATION | Facility: CLINIC | Age: 49
End: 2024-01-23
Payer: COMMERCIAL

## 2024-01-23 VITALS
SYSTOLIC BLOOD PRESSURE: 90 MMHG | WEIGHT: 151 LBS | OXYGEN SATURATION: 93 % | HEIGHT: 67 IN | HEART RATE: 92 BPM | TEMPERATURE: 98 F | DIASTOLIC BLOOD PRESSURE: 54 MMHG | RESPIRATION RATE: 18 BRPM | BODY MASS INDEX: 23.7 KG/M2

## 2024-01-23 DIAGNOSIS — F17.200 NICOTINE DEPENDENCE: Primary | ICD-10-CM

## 2024-01-23 DIAGNOSIS — F17.200 NEEDS SMOKING CESSATION EDUCATION: ICD-10-CM

## 2024-01-23 LAB
ANION GAP SERPL CALC-SCNC: 9 MMOL/L (ref 8–16)
BUN SERPL-MCNC: 12 MG/DL (ref 6–20)
CALCIUM SERPL-MCNC: 8.5 MG/DL (ref 8.7–10.5)
CATH EF QUANTITATIVE: 20 %
CHLORIDE SERPL-SCNC: 102 MMOL/L (ref 95–110)
CO2 SERPL-SCNC: 26 MMOL/L (ref 23–29)
CREAT SERPL-MCNC: 1.1 MG/DL (ref 0.5–1.4)
EST. GFR  (NO RACE VARIABLE): >60 ML/MIN/1.73 M^2
GLUCOSE SERPL-MCNC: 106 MG/DL (ref 70–110)
POCT GLUCOSE: 120 MG/DL (ref 70–110)
POCT GLUCOSE: 146 MG/DL (ref 70–110)
POTASSIUM SERPL-SCNC: 3.7 MMOL/L (ref 3.5–5.1)
SODIUM SERPL-SCNC: 137 MMOL/L (ref 136–145)
THRYOGLOBULIN INTERPRETATION: ABNORMAL
THYROGLOB AB SERPL-ACNC: <1.8 IU/ML
THYROGLOB SERPL-MCNC: 22 NG/ML

## 2024-01-23 PROCEDURE — 25000003 PHARM REV CODE 250

## 2024-01-23 PROCEDURE — 99232 SBSQ HOSP IP/OBS MODERATE 35: CPT | Mod: ,,, | Performed by: INTERNAL MEDICINE

## 2024-01-23 PROCEDURE — 25000003 PHARM REV CODE 250: Performed by: NURSE PRACTITIONER

## 2024-01-23 PROCEDURE — S4991 NICOTINE PATCH NONLEGEND: HCPCS | Performed by: NURSE PRACTITIONER

## 2024-01-23 PROCEDURE — 36415 COLL VENOUS BLD VENIPUNCTURE: CPT | Performed by: NURSE PRACTITIONER

## 2024-01-23 PROCEDURE — 99406 BEHAV CHNG SMOKING 3-10 MIN: CPT | Mod: S$GLB,,,

## 2024-01-23 PROCEDURE — 99900035 HC TECH TIME PER 15 MIN (STAT)

## 2024-01-23 PROCEDURE — 25000003 PHARM REV CODE 250: Performed by: STUDENT IN AN ORGANIZED HEALTH CARE EDUCATION/TRAINING PROGRAM

## 2024-01-23 PROCEDURE — 80048 BASIC METABOLIC PNL TOTAL CA: CPT | Performed by: NURSE PRACTITIONER

## 2024-01-23 RX ORDER — ACETAMINOPHEN 325 MG/1
650 TABLET ORAL EVERY 6 HOURS PRN
Status: DISCONTINUED | OUTPATIENT
Start: 2024-01-23 | End: 2024-01-23 | Stop reason: HOSPADM

## 2024-01-23 RX ADMIN — ASPIRIN 81 MG: 81 TABLET, COATED ORAL at 08:01

## 2024-01-23 RX ADMIN — NICOTINE 1 PATCH: 21 PATCH, EXTENDED RELEASE TRANSDERMAL at 08:01

## 2024-01-23 RX ADMIN — SACUBITRIL AND VALSARTAN 1 TABLET: 24; 26 TABLET, FILM COATED ORAL at 10:01

## 2024-01-23 RX ADMIN — CHLORDIAZEPOXIDE HYDROCHLORIDE 5 MG: 5 CAPSULE ORAL at 03:01

## 2024-01-23 RX ADMIN — HYDROCODONE BITARTRATE AND ACETAMINOPHEN 1 TABLET: 7.5; 325 TABLET ORAL at 10:01

## 2024-01-23 RX ADMIN — LIDOCAINE 1 PATCH: 50 PATCH CUTANEOUS at 12:01

## 2024-01-23 RX ADMIN — CHLORDIAZEPOXIDE HYDROCHLORIDE 5 MG: 5 CAPSULE ORAL at 08:01

## 2024-01-23 NOTE — PLAN OF CARE
No changes to previous final note.     Chasity notified by Marlon with [facility] that lifevest was approved and they can fit today. Marlon notified Nurse at [facility] of need for fitting ASAP for d/c.     Once pt has been fitted for lifevest she is cleared for d/c.     Bedside Nurse notified.     1330 Chasity notified by Bedside Nurse, Mark, that lifevest was fit and delivered to bedside.

## 2024-01-23 NOTE — PROGRESS NOTES
[facility]  Cardiology  Progress Note    Patient Name: Jaqueline Gutierrez  MRN: [id number]  Admission Date: 1/20/2024  Hospital Length of Stay: 3 days  Code Status: Full Code   Attending Physician: Sanjeev Ford MD   Primary Care Physician: Irene, Primary Doctor  Expected Discharge Date: 1/23/2024  Principal Problem:New onset of congestive heart failure    Subjective:   HPI: Jaqueline Gutierrez is a 48 y.o. female with a PMH  has a past medical history of Substance abuse and Thyroid disease.  Presented to the ER for evaluation of shortness of breath which gradually worsened over last 4 days.  Patient recently seen by PCP on 1/17/24 for similar complaint and was discharged home with Zyrtec and Flonase.  Patient was recently diagnosed with viral illness that turned into bronchitis on 1/5/24 at .  Patient reports completing Omnicef, cough medicine and steroids. Reports she is still having a sore throat, ear pain, and a post nasal drip.  In addition, patient has been complaining of right calf pain/cramp without any injury or trauma noted.  Denies any fever, aches, chills, sweats, palpitations, headache, lightheadedness, dizziness, abdominal pain common bladder/bowel complaints, or any other symptoms.     Of note, patient states that she has a history of Hashimoto's thyroiditis and has not been on medication for over a year.  Patient states that she has been self treating with snorting methamphetamines.  Patient also reports occasional marijuana use.  Denies any other substance use.     ER workup revealed BNP of 3, 195, initial troponin of 1.551 with repeat troponin of 1.615, flu and COVID negative, lactic acid negative, chest x-ray revealing:  [Cardiomegaly with perihilar edema], ultrasound of right lower extremity revealed:[Positive superficial venous thrombosis within the right superficial saphenous vein.  Negative deep venous thrombosis].  All other lab work unremarkable.  EKG revealed sinus tachycardia  with left bundle-branch block with a ventricular rate of 101 beats per minute and a QT/QTC of 412/534.  Patient received 325 mg aspirin, 60 mg of Lasix, 1 mg of Ativan in ED. hospital Medicine consulted to admit patient for new onset CHF.  Patient in agreement with treatment plan.  Patient will be admitted under inpatient status.         CARDS CONSULT OBTAINED FOR CHF. SHE IS RESPONDING TO DIURETICS CLINICALLY AHS PROGRESSIVE COUGH ORTHOPNEA SHORTNESS OF BREATH DECREASE INE XERCISE TOLERANCE HER ECHO SUGGEST A DEPRESSED EF.SHE IS DECOMPENSATED  ACUTE SYSTOLIC CHF. ON INITIAL EVAL SHE SEEMS COMFORTABLE LYING IN BED. HER EKG SHOWED NEW LBBB.  Hospital Course:   1/22/24 Pt seen and examined today, resting in bed. Feels much better, reports improvement in SOB, lying flat on exam. Labs reviewed, Crt 1.0, K 3.3 -3L for admission, Troponin peak 1.6 and trending down. Discussed in detail low EF and possible need for LifeVest pending R/LHC eval pt agreeable with plan.     1/23/24 Pt seen and examined today, resting in bed. Reports some tenderness near LHC site. LHC with occluded RCA with left to right collaterals, nml filling pressures. Labs reviewed, Crt 1.1, -3L for admission. BP tenuous this morning BB held. Lifevest approved        Review of Systems   Constitutional: Positive for malaise/fatigue.   HENT: Negative.     Eyes: Negative.    Cardiovascular: Negative.    Respiratory: Negative.     Skin: Negative.    Musculoskeletal: Negative.    Gastrointestinal: Negative.    Genitourinary: Negative.    Neurological: Negative.    Psychiatric/Behavioral: Negative.       Objective:     Vital Signs (Most Recent):  Temp: 98.3 °F (36.8 °C) (01/23/24 1217)  Pulse: 91 (01/23/24 1217)  Resp: 18 (01/23/24 1217)  BP: (!) 90/54 (01/23/24 1217)  SpO2: (!) 93 % (01/23/24 1217) Vital Signs (24h Range):  Temp:  [97.6 °F (36.4 °C)-98.3 °F (36.8 °C)] 98.3 °F (36.8 °C)  Pulse:  [] 91  Resp:  [10-26] 18  SpO2:  [92 %-99 %] 93 %  BP:  ()/(53-74) 90/54     Weight: 68.5 kg (151 lb)  Body mass index is 23.65 kg/m².     SpO2: (!) 93 %         Intake/Output Summary (Last 24 hours) at 1/23/2024 1258  Last data filed at 1/23/2024 0618  Gross per 24 hour   Intake 524.23 ml   Output 600 ml   Net -75.77 ml       Lines/Drains/Airways       Peripheral Intravenous Line  Duration                  Peripheral IV - Single Lumen 01/20/24 1646 20 G Left Antecubital 2 days                       Physical Exam  Vitals and nursing note reviewed.   Constitutional:       Appearance: Normal appearance.   HENT:      Head: Normocephalic.   Eyes:      Pupils: Pupils are equal, round, and reactive to light.   Cardiovascular:      Rate and Rhythm: Normal rate and regular rhythm.      Heart sounds: Normal heart sounds, S1 normal and S2 normal. No murmur heard.     No S3 or S4 sounds.   Pulmonary:      Effort: Pulmonary effort is normal.      Breath sounds: Normal breath sounds.   Abdominal:      General: Bowel sounds are normal.      Palpations: Abdomen is soft.   Musculoskeletal:         General: Normal range of motion.      Cervical back: Normal range of motion.   Skin:     Capillary Refill: Capillary refill takes less than 2 seconds.      Comments: [facility] site C/D/I   Neurological:      General: No focal deficit present.      Mental Status: She is alert and oriented to person, place, and time.   Psychiatric:         Mood and Affect: Mood normal.         Behavior: Behavior normal.         Thought Content: Thought content normal.            Significant Labs: BMP:   Recent Labs   Lab 01/22/24  0555 01/23/24  0619    106    137   K 3.3* 3.7   CL 98 102   CO2 28 26   BUN 10 12   CREATININE 1.0 1.1   CALCIUM 8.8 8.5*   , CMP   Recent Labs   Lab 01/22/24  0555 01/23/24  0619    137   K 3.3* 3.7   CL 98 102   CO2 28 26    106   BUN 10 12   CREATININE 1.0 1.1   CALCIUM 8.8 8.5*   ANIONGAP 10 9   , CBC   Recent Labs   Lab 01/22/24  0555   WBC 5.77   HGB  "14.6   HCT 42.5      , INR No results for input(s): "INR", "PROTIME" in the last 48 hours., Lipid Panel   Recent Labs   Lab 01/22/24  0555   CHOL 215*   HDL 39*   LDLCALC 151.4   TRIG 123   CHOLHDL 18.1*   , Troponin No results for input(s): "TROPONINI" in the last 48 hours., and All pertinent lab results from the last 24 hours have been reviewed.    Significant Imaging: Cardiac Cath: reviewed, Echocardiogram: Transthoracic echo (TTE) complete (Cupid Only):   Results for orders placed or performed during the hospital encounter of 01/20/24   Echo   Result Value Ref Range    BSA 1.8 m2    LVOT stroke volume 33.54 cm3    LVIDd 6.99 (A) 3.5 - 6.0 cm    LV Systolic Volume 225.16 mL    LV Systolic Volume Index 125.8 mL/m2    LVIDs 6.62 (A) 2.1 - 4.0 cm    LV Diastolic Volume 254.73 mL    LV Diastolic Volume Index 142.31 mL/m2    IVS 1.20 (A) 0.6 - 1.1 cm    LVOT diameter 1.98 cm    LVOT area 3.1 cm2    FS 5 (A) 28 - 44 %    Left Ventricle Relative Wall Thickness 0.36 cm    Posterior Wall 1.25 (A) 0.6 - 1.1 cm    LV mass 416.35 g    LV Mass Index 233 g/m2    MV Peak E Luis 1.44 m/s    TDI LATERAL 0.12 m/s    TDI SEPTAL 0.11 m/s    E/E' ratio 12.52 m/s    TR Max Luis 2.36 m/s    IVRT 38.06 msec    LV SEPTAL E/E' RATIO 13.09 m/s    LV LATERAL E/E' RATIO 12.00 m/s    LVOT peak luis 0.78 m/s    Left Ventricular Outflow Tract Mean Velocity 0.63 cm/s    Left Ventricular Outflow Tract Mean Gradient 1.71 mmHg    RVDD 2.89 cm    RVOT peak VTI 9.0 cm    TAPSE 1.69 cm    LA size 4.71 cm    Left Atrium Minor Axis 5.90 cm    Left Atrium Major Axis 5.92 cm    RA Major Axis 4.20 cm    AV mean gradient 5 mmHg    AV peak gradient 7 mmHg    Ao peak luis 1.35 m/s    Ao VTI 19.60 cm    LVOT peak VTI 10.90 cm    AV valve area 1.71 cm²    AV Velocity Ratio 0.58     AV index (prosthetic) 0.56     [place] by Velocity Ratio 1.78 cm²    Mr max luis 4.33 m/s    MV stenosis pressure 1/2 time 25.51 ms    MV valve area p 1/2 method 8.62 cm2    TV mean " gradient 22 mmHg    Triscuspid Valve Regurgitation Peak Gradient 22 mmHg    PV mean gradient 1 mmHg    RVOT peak rodney 0.61 m/s    Ao root annulus 2.89 cm    STJ 2.96 cm    Ascending aorta 2.87 cm    IVC diameter 1.42 cm    Mean e' 0.12 m/s    ZLVIDS 6.10     ZLVIDD 3.45     LA Volume Index 54.2 mL/m2    LA volume 97.01 cm3    LA WIDTH 4.1 cm    RA Width 3.6 cm    EF 15 %    TV resting pulmonary artery pressure 30 mmHg    RV TB RVSP 10 mmHg    Est. RA pres 8 mmHg    Narrative      Left Ventricle: The left ventricle is severely dilated. Mildly   increased wall thickness. There is mild concentric hypertrophy. Regional   wall motion abnormalities present. Septal motion is consistent with bundle   branch block. There is severely reduced systolic function with a visually   estimated ejection fraction of less than 30%. Ejection fraction by visual   approximation is 15%. There is normal diastolic function.    Right Ventricle: Normal right ventricular cavity size. Wall thickness   is normal. Right ventricle wall motion  is normal. Systolic function is   normal.    Left Atrium: Left atrium is severely dilated.    Aortic Valve: The aortic valve is a trileaflet valve. There is mild   stenosis. Aortic valve area by VTI is 1.71 cm². Aortic valve peak velocity   is 1.35 m/s. Mean gradient is 5 mmHg. The dimensionless index is 0.56.    Mitral Valve: There is mild to moderate regurgitation.    IVC/SVC: Intermediate venous pressure at 8 mmHg.    Pericardium: There is a trivial circumferential effusion. No indication   of cardiac tamponade.     , EKG: reviewed, Stress Test: reviewed, and X-Ray: CXR: X-Ray Chest 1 View (CXR):   Results for orders placed or performed during the hospital encounter of 01/20/24   X-Ray Chest 1 View    Narrative    EXAMINATION:  XR CHEST 1 VIEW    CLINICAL HISTORY:  shortness of breath;    TECHNIQUE:  Single frontal view of the chest was performed.    COMPARISON:  None    FINDINGS:  Cardiomegaly with  perihilar edema.  Correlate clinically for CHF.    Bones are intact.  Nipple markers are seen.      Impression    As above      Electronically signed by: Richy Almonte  Date:    01/20/2024  Time:    16:24     Assessment and Plan:       * New onset of congestive heart failure    Recent Labs   Lab 01/20/24  1646   BNP 3,195*     HAS ACIUTE DECOMPENSATED SYSTOLIC CHF WILL DIURESE AND OPTIMIZE MEDICAL THERAPY AS BP ALLOWS. FLUID RESTRICTION LOW SALT DIET SMOKING CESSATION DISCUSSED.  HAS NEW ONSET LBBB THIS REQUIRES AN ISCHEMIC EVAL ONCE EUVOLEMIC DUE TO HER RF AND FH I THINK R/LHC WOULD BE IDEAL.    1/22/24  Echo with EF 15%  Diuresed well, -3L for admission, resp status improved  RLHC planned for today with Dr. Menard, All risks, benefits and treatment alternatives explained. All questions answered. Pt agreeable to proceed  Cont hep gtt, ASA, BB, lasix, ARB    1/23/24  R/LHC with nml filling pressures, RCA occluded with left to right collaterals.   Cont OMT BB lasix  Added entresto today, monitor BP  LifeVest approved  Follow up in [facility]    Elevated troponin  SECONDARY TO CHF DEMAND ISCHEMIA SHE WILL NEED R/LHC WHEN EUVOLEMIC.    1/22/24  R/LHC planned for today  Keep NPO    1/23/24  LHC with occluded RCA with left to right collaterals   Cont BB asa  Consider adding statin, LFTs bumped    LBBB (left bundle branch block)  R/LHC planned for today    Tobacco use  SMOKING CESSATION.    Substance abuse  cessation    Venous embolism and thrombosis of superficial vessels of lower extremity  PER PRIMARY TEAM        VTE Risk Mitigation (From admission, onward)           Ordered     Place MARVIN hose  Until discontinued         01/20/24 1917     IP VTE LOW RISK PATIENT  Once         01/20/24 1917                    Shira Manriquez, NP  Cardiology  [facility] - Med Surg

## 2024-01-23 NOTE — PLAN OF CARE
Discussed poc with pt, pt verbalized understanding    Purposeful rounding every 2hours    VS wnl  Cardiac monitoring in use, pt is NSR, tele monitor # [id number]  Blood glucose monitoring   Fall precautions in place, remains injury free  Pt denies c/o nausea   Pain  under control with PRN meds      Accurate I&Os  Bed locked at lowest position  Call light within reach    Chart check complete  Patient is ready for discharge

## 2024-01-23 NOTE — SUBJECTIVE & OBJECTIVE
Review of Systems   Constitutional: Positive for malaise/fatigue.   HENT: Negative.     Eyes: Negative.    Cardiovascular: Negative.    Respiratory: Negative.     Skin: Negative.    Musculoskeletal: Negative.    Gastrointestinal: Negative.    Genitourinary: Negative.    Neurological: Negative.    Psychiatric/Behavioral: Negative.       Objective:     Vital Signs (Most Recent):  Temp: 98.3 °F (36.8 °C) (01/23/24 1217)  Pulse: 91 (01/23/24 1217)  Resp: 18 (01/23/24 1217)  BP: (!) 90/54 (01/23/24 1217)  SpO2: (!) 93 % (01/23/24 1217) Vital Signs (24h Range):  Temp:  [97.6 °F (36.4 °C)-98.3 °F (36.8 °C)] 98.3 °F (36.8 °C)  Pulse:  [] 91  Resp:  [10-26] 18  SpO2:  [92 %-99 %] 93 %  BP: ()/(53-74) 90/54     Weight: 68.5 kg (151 lb)  Body mass index is 23.65 kg/m².     SpO2: (!) 93 %         Intake/Output Summary (Last 24 hours) at 1/23/2024 1258  Last data filed at 1/23/2024 0618  Gross per 24 hour   Intake 524.23 ml   Output 600 ml   Net -75.77 ml       Lines/Drains/Airways       Peripheral Intravenous Line  Duration                  Peripheral IV - Single Lumen 01/20/24 1646 20 G Left Antecubital 2 days                       Physical Exam  Vitals and nursing note reviewed.   Constitutional:       Appearance: Normal appearance.   HENT:      Head: Normocephalic.   Eyes:      Pupils: Pupils are equal, round, and reactive to light.   Cardiovascular:      Rate and Rhythm: Normal rate and regular rhythm.      Heart sounds: Normal heart sounds, S1 normal and S2 normal. No murmur heard.     No S3 or S4 sounds.   Pulmonary:      Effort: Pulmonary effort is normal.      Breath sounds: Normal breath sounds.   Abdominal:      General: Bowel sounds are normal.      Palpations: Abdomen is soft.   Musculoskeletal:         General: Normal range of motion.      Cervical back: Normal range of motion.   Skin:     Capillary Refill: Capillary refill takes less than 2 seconds.      Comments: [facility] site C/D/I   Neurological:      " General: No focal deficit present.      Mental Status: She is alert and oriented to person, place, and time.   Psychiatric:         Mood and Affect: Mood normal.         Behavior: Behavior normal.         Thought Content: Thought content normal.            Significant Labs: BMP:   Recent Labs   Lab 01/22/24  0555 01/23/24  0619    106    137   K 3.3* 3.7   CL 98 102   CO2 28 26   BUN 10 12   CREATININE 1.0 1.1   CALCIUM 8.8 8.5*   , CMP   Recent Labs   Lab 01/22/24  0555 01/23/24  0619    137   K 3.3* 3.7   CL 98 102   CO2 28 26    106   BUN 10 12   CREATININE 1.0 1.1   CALCIUM 8.8 8.5*   ANIONGAP 10 9   , CBC   Recent Labs   Lab 01/22/24  0555   WBC 5.77   HGB 14.6   HCT 42.5      , INR No results for input(s): "INR", "PROTIME" in the last 48 hours., Lipid Panel   Recent Labs   Lab 01/22/24  0555   CHOL 215*   HDL 39*   LDLCALC 151.4   TRIG 123   CHOLHDL 18.1*   , Troponin No results for input(s): "TROPONINI" in the last 48 hours., and All pertinent lab results from the last 24 hours have been reviewed.    Significant Imaging: Cardiac Cath: reviewed, Echocardiogram: Transthoracic echo (TTE) complete (Cupid Only):   Results for orders placed or performed during the hospital encounter of 01/20/24   Echo   Result Value Ref Range    BSA 1.8 m2    LVOT stroke volume 33.54 cm3    LVIDd 6.99 (A) 3.5 - 6.0 cm    LV Systolic Volume 225.16 mL    LV Systolic Volume Index 125.8 mL/m2    LVIDs 6.62 (A) 2.1 - 4.0 cm    LV Diastolic Volume 254.73 mL    LV Diastolic Volume Index 142.31 mL/m2    IVS 1.20 (A) 0.6 - 1.1 cm    LVOT diameter 1.98 cm    LVOT area 3.1 cm2    FS 5 (A) 28 - 44 %    Left Ventricle Relative Wall Thickness 0.36 cm    Posterior Wall 1.25 (A) 0.6 - 1.1 cm    LV mass 416.35 g    LV Mass Index 233 g/m2    MV Peak E Luis 1.44 m/s    TDI LATERAL 0.12 m/s    TDI SEPTAL 0.11 m/s    E/E' ratio 12.52 m/s    TR Max Luis 2.36 m/s    IVRT 38.06 msec    LV SEPTAL E/E' RATIO 13.09 m/s    LV " LATERAL E/E' RATIO 12.00 m/s    LVOT peak rodney 0.78 m/s    Left Ventricular Outflow Tract Mean Velocity 0.63 cm/s    Left Ventricular Outflow Tract Mean Gradient 1.71 mmHg    RVDD 2.89 cm    RVOT peak VTI 9.0 cm    TAPSE 1.69 cm    LA size 4.71 cm    Left Atrium Minor Axis 5.90 cm    Left Atrium Major Axis 5.92 cm    RA Major Axis 4.20 cm    AV mean gradient 5 mmHg    AV peak gradient 7 mmHg    Ao peak rodney 1.35 m/s    Ao VTI 19.60 cm    LVOT peak VTI 10.90 cm    AV valve area 1.71 cm²    AV Velocity Ratio 0.58     AV index (prosthetic) 0.56     [place] by Velocity Ratio 1.78 cm²    Mr max ordney 4.33 m/s    MV stenosis pressure 1/2 time 25.51 ms    MV valve area p 1/2 method 8.62 cm2    TV mean gradient 22 mmHg    Triscuspid Valve Regurgitation Peak Gradient 22 mmHg    PV mean gradient 1 mmHg    RVOT peak rodney 0.61 m/s    Ao root annulus 2.89 cm    STJ 2.96 cm    Ascending aorta 2.87 cm    IVC diameter 1.42 cm    Mean e' 0.12 m/s    ZLVIDS 6.10     ZLVIDD 3.45     LA Volume Index 54.2 mL/m2    LA volume 97.01 cm3    LA WIDTH 4.1 cm    RA Width 3.6 cm    EF 15 %    TV resting pulmonary artery pressure 30 mmHg    RV TB RVSP 10 mmHg    Est. RA pres 8 mmHg    Narrative      Left Ventricle: The left ventricle is severely dilated. Mildly   increased wall thickness. There is mild concentric hypertrophy. Regional   wall motion abnormalities present. Septal motion is consistent with bundle   branch block. There is severely reduced systolic function with a visually   estimated ejection fraction of less than 30%. Ejection fraction by visual   approximation is 15%. There is normal diastolic function.    Right Ventricle: Normal right ventricular cavity size. Wall thickness   is normal. Right ventricle wall motion  is normal. Systolic function is   normal.    Left Atrium: Left atrium is severely dilated.    Aortic Valve: The aortic valve is a trileaflet valve. There is mild   stenosis. Aortic valve area by VTI is 1.71 cm². Aortic valve  peak velocity   is 1.35 m/s. Mean gradient is 5 mmHg. The dimensionless index is 0.56.    Mitral Valve: There is mild to moderate regurgitation.    IVC/SVC: Intermediate venous pressure at 8 mmHg.    Pericardium: There is a trivial circumferential effusion. No indication   of cardiac tamponade.     , EKG: reviewed, Stress Test: reviewed, and X-Ray: CXR: X-Ray Chest 1 View (CXR):   Results for orders placed or performed during the hospital encounter of 01/20/24   X-Ray Chest 1 View    Narrative    EXAMINATION:  XR CHEST 1 VIEW    CLINICAL HISTORY:  shortness of breath;    TECHNIQUE:  Single frontal view of the chest was performed.    COMPARISON:  None    FINDINGS:  Cardiomegaly with perihilar edema.  Correlate clinically for CHF.    Bones are intact.  Nipple markers are seen.      Impression    As above      Electronically signed by: Richy Almonte  Date:    01/20/2024  Time:    16:24

## 2024-01-23 NOTE — PROGRESS NOTES
Patient is ready to quit smoking and requests a smoking cessation appointment.  Patient has a nicotine patch in use.

## 2024-01-23 NOTE — ASSESSMENT & PLAN NOTE
Recent Labs   Lab 01/20/24  1646   BNP 3,195*     HAS ACIUTE DECOMPENSATED SYSTOLIC CHF WILL DIURESE AND OPTIMIZE MEDICAL THERAPY AS BP ALLOWS. FLUID RESTRICTION LOW SALT DIET SMOKING CESSATION DISCUSSED.  HAS NEW ONSET LBBB THIS REQUIRES AN ISCHEMIC EVAL ONCE EUVOLEMIC DUE TO HER RF AND FH I THINK R/LHC WOULD BE IDEAL.    1/22/24  Echo with EF 15%  Diuresed well, -3L for admission, resp status improved  RLHC planned for today with Dr. Menard, All risks, benefits and treatment alternatives explained. All questions answered. Pt agreeable to proceed  Cont hep gtt, ASA, BB, lasix, ARB    1/23/24  R/LHC with nml filling pressures, RCA occluded with left to right collaterals.   Cont OMT BB lasix  Added entresto today, monitor BP  LifeVest approved  Follow up in [facility]

## 2024-01-23 NOTE — PLAN OF CARE
Patient resting, no distress noted, Norco given for pain x1 tonight, patient on tele [id number] SR with PVCs, right groin gauze/tegaderm dressing c/d/I soft around the dressing, strong pedal and radial pulses, no s/s of a hematoma noted, fall precautions in place with bed alarm on, chart check completed

## 2024-01-23 NOTE — PROGRESS NOTES
"[facility]) nurse navigator notified of HFTCC candidate in need of education and introduction to 4-6 week program.      PT aao x 3 while sitting up on side of bed. Introduced self to pt as HFTCC nurse navigator.     Patient given "Home Care Guide for Heart Failure Patients" , "[facility]" flyer and "Daily weight and symptom tracker".  Encouraged pt to review information.      Reviewed the following key points of HFTCC program with pt and family:   1.) Take your medications as directed.    2.) Weight yourself daily   3.) Follow low salt and limited fluid diet.    4.) Stop smoking and start exercising   5.) Go to your appointments and call your team.      Pt reminded to follow Symptom tracker and to call at the onset of symptoms according to tracker.     Reviewed plan for follow up once discharged to include phone calls, in person and virtual visits to assist pt optimizing their heart failure medication regimen and encouraging healthy lifestyle modifications.  Reminded pt that program will assist them over the next 4-6 weeks and then patient will be transferred to long term care provider .  Reminded pt how to contact HFTCC navigator via phone and or via [facility].     Pt  instructed appointment with SANDRA Greenwood will be printed on hospital discharge paperwork.     Pt also reminded HF nurse will call 48-72 hours after discharge to check on them.     PT verbalize read back of information given.  Encouraged pt and family to read over information often and contact team with any questions or concerns.      "

## 2024-01-23 NOTE — ASSESSMENT & PLAN NOTE
SECONDARY TO CHF DEMAND ISCHEMIA SHE WILL NEED R/LHC WHEN EUVOLEMIC.    1/22/24  R/LHC planned for today  Keep NPO    1/23/24  LHC with occluded RCA with left to right collaterals   Cont BB asa  Consider adding statin, LFTs bumped

## 2024-01-26 ENCOUNTER — TELEPHONE (OUTPATIENT)
Dept: CARDIOLOGY | Facility: CLINIC | Age: 49
End: 2024-01-26
Payer: MEDICAID

## 2024-01-26 NOTE — TELEPHONE ENCOUNTER
"[facility]) hospital discharge 48-72 hour phone follow up completed.     Most Recent Hospital Discharge Date: 1/23/24  Last admission Diagnosis/chief complaint:new onset HF    TCC nurse Navigator spoke with pt    Pt says she still has some sob but not sure if its due to the lifevest being tight.   Also says she feels very dizzy and tired. Bp running in 80's systolic.   After reviewing meds pt taking both losartan and entresto as both were listed on her d/c summary. Pt instructed to STOP losartan and continue Entresto. [facility] provider notified.       Pt reports the following:  [x]  Shortness of Breath with Activity  []  Shortness of Breath at rest   [x]  Fatigue  []  Edema   [] Chest pain or tightness  [] Weight Increase since discharge  [] None of the above    Medications:   Discharge medication reviewed with pt.  Pt reports having medication list available and has all medications at home for use per list.     Education:   Confirmed pt received "Home Care Guide for Heart Failure Patients" while admitted.   Reviewed key points as listed below.     Recommend 2 -3 gram sodium restriction and 1500 cc-2000 cc fluid restriction.  Encourage physical activity with graded exercise program.  Requested patient to weigh themselves daily, and to notify us if their weight increases by more than 3 lbs in 1 day or 5 lbs in 3 days.   Reminded patient to use "Daily weight and symptom tracker" to record and guide patient on when and how to call [facility]. PT may also use symptom tracker if no scale available  Pt reports being in the green (color) Zone. If in yellow/red, reminded that they should be calling [facility] today or now.     Watch for these Signs and Symptoms: If any of these occur, contact [facility] immediately:   Increase in shortness of breath with movement   Increase in swelling in your legs and ankles   Weight gain of more than 3 pounds in a day or 5 pounds in 3 days.   Difficulty breathing when you " are lying down   Worsening fatigue or tiredness   Stomach bloating, a full feeling or a loss of appetite   Increased coughing--especially when you are lying down      Pt was able to verbalize back to nurse in their own words correct diet/fluid restrictions, necessity for exercise, warning signs and symptoms, when and how to contact their TCC team.      Pt educated on follow-up plan while in [facility] program to include:   Week 1 -  F/u appt with Provider and HF nurse (Date) 1/31 with Michaela FLORES-confirmed.    Week 2-5 - In person/ Virtual/ phone call check ins    Week 5-7 - Pt will discharge from [facility] and transition to longterm care provider (Cardiology/PCP/ Advanced Heart Failure).      Patient active on myChart? Yes      Pt given the following contact information for ease of communication: [phone] (Mon-Fri, 8a-5p) & for urgent issues on the weekend call Milena on-call [phone] to page the Cardiology MD on call.  Pt also encouraged utilize [facility] messaging as well.      Will follow up with pt at first clinic visit and HF nurse navigator available for pt questions, issues or concerns.

## 2024-01-31 ENCOUNTER — OFFICE VISIT (OUTPATIENT)
Dept: CARDIOLOGY | Facility: CLINIC | Age: 49
End: 2024-01-31
Payer: MEDICAID

## 2024-01-31 VITALS
BODY MASS INDEX: 23.18 KG/M2 | DIASTOLIC BLOOD PRESSURE: 60 MMHG | WEIGHT: 147.69 LBS | SYSTOLIC BLOOD PRESSURE: 90 MMHG | HEART RATE: 100 BPM | HEIGHT: 67 IN

## 2024-01-31 DIAGNOSIS — I44.7 LBBB (LEFT BUNDLE BRANCH BLOCK): Primary | ICD-10-CM

## 2024-01-31 DIAGNOSIS — I50.9 NEW ONSET OF CONGESTIVE HEART FAILURE: Primary | ICD-10-CM

## 2024-01-31 PROCEDURE — 99999 PR PBB SHADOW E&M-EST. PATIENT-LVL III: CPT | Mod: PBBFAC,,, | Performed by: PHYSICIAN ASSISTANT

## 2024-01-31 PROCEDURE — 3078F DIAST BP <80 MM HG: CPT | Mod: CPTII,,, | Performed by: PHYSICIAN ASSISTANT

## 2024-01-31 PROCEDURE — 4010F ACE/ARB THERAPY RXD/TAKEN: CPT | Mod: CPTII,,, | Performed by: PHYSICIAN ASSISTANT

## 2024-01-31 PROCEDURE — 1111F DSCHRG MED/CURRENT MED MERGE: CPT | Mod: CPTII,,, | Performed by: PHYSICIAN ASSISTANT

## 2024-01-31 PROCEDURE — 1160F RVW MEDS BY RX/DR IN RCRD: CPT | Mod: CPTII,,, | Performed by: PHYSICIAN ASSISTANT

## 2024-01-31 PROCEDURE — 1159F MED LIST DOCD IN RCRD: CPT | Mod: CPTII,,, | Performed by: PHYSICIAN ASSISTANT

## 2024-01-31 PROCEDURE — 3044F HG A1C LEVEL LT 7.0%: CPT | Mod: CPTII,,, | Performed by: PHYSICIAN ASSISTANT

## 2024-01-31 PROCEDURE — 3074F SYST BP LT 130 MM HG: CPT | Mod: CPTII,,, | Performed by: PHYSICIAN ASSISTANT

## 2024-01-31 PROCEDURE — 99213 OFFICE O/P EST LOW 20 MIN: CPT | Mod: PBBFAC | Performed by: PHYSICIAN ASSISTANT

## 2024-01-31 PROCEDURE — 99204 OFFICE O/P NEW MOD 45 MIN: CPT | Mod: S$PBB,,, | Performed by: PHYSICIAN ASSISTANT

## 2024-01-31 PROCEDURE — 3008F BODY MASS INDEX DOCD: CPT | Mod: CPTII,,, | Performed by: PHYSICIAN ASSISTANT

## 2024-01-31 NOTE — PROGRESS NOTES
HF T[facility] Provider Note (Initial Clinic) Consult Note    Date of original referral: 1/22/2024  Age: 48 y.o.  Gender: female  Ethnicity: Not  or /a   Number of admissions for CHF within the preceding year: 1   Duration of CHF:   Type of Congestive Heart Failure: Systolic   Etiology: Ischemic   Social history:   Enrolled in Infusion suite: no    Diagnostic Labs:   EKG - 01/21/2024  CXR - 01/20/2024  ECHO - 01/21/2024  Stress test -   Stress echo -   Pharmacologic stress -   Cardiac catheterization - 01/23/2024   Cardiac MRI -     Lab Results   Component Value Date     01/23/2024     01/22/2024    K 3.7 01/23/2024    K 3.3 (L) 01/22/2024     01/23/2024    CL 98 01/22/2024    CO2 26 01/23/2024    CO2 28 01/22/2024     01/23/2024     01/22/2024    BUN 12 01/23/2024    BUN 10 01/22/2024    CREATININE 1.1 01/23/2024    CREATININE 1.0 01/22/2024    CALCIUM 8.5 (L) 01/23/2024    CALCIUM 8.8 01/22/2024    PROT 6.2 01/20/2024    PROT 6.5 12/30/2023    ALBUMIN 3.4 (L) 01/20/2024    ALBUMIN 3.4 (L) 12/30/2023    BILITOT 0.5 01/20/2024    BILITOT 0.6 12/30/2023    ALKPHOS 106 01/20/2024    ALKPHOS 99 12/30/2023    AST 52 (H) 01/20/2024    AST 30 12/30/2023    ALT 61 (H) 01/20/2024    ALT 40 12/30/2023    ANIONGAP 9 01/23/2024    ANIONGAP 10 01/22/2024    ESTGFRAFRICA >60 07/17/2021    ESTGFRAFRICA >60 11/20/2019    EGFRNONAA >60 07/17/2021    EGFRNONAA >60 11/20/2019       Lab Results   Component Value Date    WBC 5.77 01/22/2024    WBC 5.31 01/20/2024    RBC 4.73 01/22/2024    RBC 3.86 (L) 01/20/2024    HGB 14.6 01/22/2024    HGB 12.0 01/20/2024    HCT 42.5 01/22/2024    HCT 35.0 (L) 01/20/2024    MCV 90 01/22/2024    MCV 91 01/20/2024    MCH 30.9 01/22/2024    MCH 31.1 (H) 01/20/2024    MCHC 34.4 01/22/2024    MCHC 34.3 01/20/2024    RDW 13.0 01/22/2024    RDW 13.0 01/20/2024     01/22/2024     01/20/2024    MPV 10.5 01/22/2024    MPV 10.1 01/20/2024    IMMGR 0.3  01/22/2024    IMMGR 0.6 (H) 01/20/2024    IGABS 0.02 01/22/2024    IGABS 0.03 01/20/2024    LYMPH 1.1 01/22/2024    LYMPH 19.4 01/22/2024    MONO 0.5 01/22/2024    MONO 9.0 01/22/2024    EOS 0.2 01/22/2024    EOS 0.1 01/20/2024    BASO 0.02 01/22/2024    BASO 0.02 01/20/2024    NRBC 0 01/22/2024    NRBC 0 01/20/2024    GRAN 3.9 01/22/2024    GRAN 68.2 01/22/2024    EOSINOPHIL 2.8 01/22/2024    EOSINOPHIL 1.3 01/20/2024    BASOPHIL 0.3 01/22/2024    BASOPHIL 0.4 01/20/2024       Lab Results   Component Value Date    BNP 3,195 (H) 01/20/2024    BNP 11 07/17/2021    MG 1.6 01/20/2024    TROPONINI 1.510 (H) 01/20/2024    TROPONINI 1.615 (H) 01/20/2024    HGBA1C 5.7 (H) 01/20/2024    TSH 0.904 01/21/2024    TSH 1.357 07/17/2021    FREET4 0.93 01/21/2024    G3DZHFL 6.8 01/21/2024       Lab Results   Component Value Date    CHOL 215 (H) 01/22/2024    TRIG 123 01/22/2024    HDL 39 (L) 01/22/2024    LDLCALC 151.4 01/22/2024    CHOLHDL 18.1 (L) 01/22/2024    TOTALCHOLEST 5.5 (H) 01/22/2024    NONHDLCHOL 176 01/22/2024    COLORU Colorless (A) 12/30/2023    APPEARANCEUA Clear 12/30/2023    PHUR 8.0 12/30/2023    SPECGRAV <1.005 (A) 12/30/2023    PROTEINUA Negative 12/30/2023    GLUCUA Negative 12/30/2023    KETONESU Negative 12/30/2023    BILIRUBINUA Negative 12/30/2023    OCCULTUA Negative 12/30/2023    NITRITE Negative 12/30/2023    LEUKOCYTESUR Negative 12/30/2023       List all implanted cardiac devices:   LV    Current Outpatient Medications on File Prior to Visit   Medication Sig Dispense Refill    aspirin (ECOTRIN) 81 MG EC tablet Take 1 tablet (81 mg total) by mouth once daily. 90 tablet 3    furosemide (LASIX) 20 MG tablet Take 2 tablets (40 mg total) by mouth 2 (two) times daily. 120 tablet 11    metoprolol tartrate (LOPRESSOR) 25 MG tablet Take 0.5 tablets (12.5 mg total) by mouth 2 (two) times daily. 30 tablet 11    nitroGLYCERIN (NITROSTAT) 0.4 MG SL tablet Place 1 tablet (0.4 mg total) under the tongue every 5  (five) minutes as needed for Chest pain. 25 tablet 0    sacubitriL-valsartan (ENTRESTO) 24-26 mg per tablet Take 1 tablet by mouth 2 (two) times daily. 180 tablet 0    albuterol (PROVENTIL/VENTOLIN HFA) 90 mcg/actuation inhaler Inhale 2 puffs into the lungs every 4 (four) hours as needed.      cetirizine (ZYRTEC) 10 MG tablet Take 1 tablet by mouth every morning.      fluticasone propionate (FLONASE) 50 mcg/actuation nasal spray 1 spray by Each Nostril route every morning.      omeprazole (PRILOSEC) 20 MG capsule Take 20 mg by mouth once daily.      [DISCONTINUED] levothyroxine (SYNTHROID, LEVOTHROID) 175 MCG tablet Take 1 tablet (175 mcg total) by mouth once daily. 30 tablet 11    [DISCONTINUED] losartan (COZAAR) 25 MG tablet Take 1 tablet (25 mg total) by mouth once daily. 90 tablet 3     No current facility-administered medications on file prior to visit.         HPI:  Jaqueline Gutierrez is a 48 y.o. female with a PMH  has a past medical history of Substance abuse and Thyroid disease.  Presented to the ER for evaluation of shortness of breath which gradually worsened over last 4 days.  Patient recently seen by PCP on 1/17/24 for similar complaint and was discharged home with Zyrtec and Flonase.  Patient was recently diagnosed with viral illness that turned into bronchitis on 1/5/24 at .  Patient reports completing Omnicef, cough medicine and steroids. Reports she is still having a sore throat, ear pain, and a post nasal drip.  In addition, patient has been complaining of right calf pain/cramp without any injury or trauma noted.  Denies any fever, aches, chills, sweats, palpitations, headache, lightheadedness, dizziness, abdominal pain common bladder/bowel complaints, or any other symptoms.     Of note, patient states that she has a history of Hashimoto's thyroiditis and has not been on medication for over a year.  Patient states that she has been self treating with snorting methamphetamines.  Patient also  reports occasional marijuana use.  Denies any other substance use.     ER workup revealed BNP of 3, 195, initial troponin of 1.551 with repeat troponin of 1.615, flu and COVID negative, lactic acid negative, chest x-ray revealing:  [Cardiomegaly with perihilar edema], ultrasound of right lower extremity revealed:[Positive superficial venous thrombosis within the right superficial saphenous vein.  Negative deep venous thrombosis].  All other lab work unremarkable.  EKG revealed sinus tachycardia with left bundle-branch block with a ventricular rate of 101 beats per minute and a QT/QTC of 412/534.  Patient received 325 mg aspirin, 60 mg of Lasix, 1 mg of Ativan in ED. hospital Medicine consulted to admit patient for new onset CHF.  Patient in agreement with treatment plan.  Patient will be admitted under inpatient status.           A 48-year-old woman with a history of alcohol abuse-induced cardiomyopathy was admitted for new onset acute heart failure and congestive heart failure, characterized by symptoms like progressive cough, orthopnea, and decreased exercise tolerance. On January 21, 2024, she reported severe right-sided chest pain and was initially uncooperative with morning lab work, insisting on IV Dilaudid for pain relief before relenting. Post-administration, she rested comfortably but remained minimally cooperative. Her treatment included Furosemide for fluid control, a strict fluid restriction of 1.5 liters, telemetry, and close monitoring with daily weights and input-output checks. Cardiology was consulted, and patient education on self-care and diet was ongoing.     An echocardiogram revealed a depressed ejection fraction, indicating decompensated acute systolic congestive heart failure. Despite no edema and overall euvolemia, she received 60 mg IV Lasix in the ER, followed by a scheduled 40 mg every 12 hours. Elevated troponins and chest pain led to the initiation of a heparin drip, and ongoing  monitoring of electrolytes and urine output was ordered.     By January 22, the patient showed improvement, with reduced shortness of breath and declining troponin levels. A heart catheterization revealed an occluded mid-right coronary artery  and a significantly reduced EF of 25%. The cardiologist recommended medical treatment and a life vest, attributing the cardiac myopathy to alcohol use. Preparations for discharge included arranging a life vest and sending medications to the bedside via [facility].        PHYSICAL:   Vitals:    01/31/24 0910   BP: 90/60   Pulse: 100      @[id number](3)@    JVD: no,    Heart rhythm: regular  Cardiac murmur: No    S3: no  S4: no  Lungs: clear  Liver span: 10 cm:   Hepatojugular reflux: no  Edema: no,       ASSESSMENT: acute systolic HF    PLAN:      Patient Instructions:   Instruct the patient to notify this clinic if HH, a physician or an advanced care provider wants to change medication one of their HF medications   Activity and Diet restrictions:   Recommend 2-3 gram sodium restriction and 1500cc- 2000cc fluid restriction.  Encourage physical activity with graded exercise program.  Requested patient to weigh themselves daily, and to notify us if their weight increases by more than 3 lbs in 1 day or 5 lbs in 3 days.    Assigned dry weight on home scale:   Medication changes (include current dose and changed dose)  Stop losartan  Taken ARNi BID  CHF education done  RTC 1 month, echo in   Upcoming labs and date anticipated: echo in 3 months for AICD

## 2024-02-05 ENCOUNTER — TELEPHONE (OUTPATIENT)
Dept: FAMILY MEDICINE | Facility: CLINIC | Age: 49
End: 2024-02-05
Payer: MEDICAID

## 2024-02-05 ENCOUNTER — PATIENT MESSAGE (OUTPATIENT)
Dept: CARDIOLOGY | Facility: CLINIC | Age: 49
End: 2024-02-05
Payer: MEDICAID

## 2024-02-05 DIAGNOSIS — I50.9 NEW ONSET OF CONGESTIVE HEART FAILURE: Primary | ICD-10-CM

## 2024-02-05 RX ORDER — ROSUVASTATIN CALCIUM 20 MG/1
20 TABLET, COATED ORAL DAILY
Qty: 90 TABLET | Refills: 3 | Status: SHIPPED | OUTPATIENT
Start: 2024-02-05 | End: 2025-02-04

## 2024-02-05 NOTE — LETTER
February 5, 2024      [facility]  [id number]  [place] [address]  Phone: [phone]  Fax: [phone]       Patient: Jaqueline Gutierrez   YOB: 1975  Date of Visit: 02/05/2024    To Whom It May Concern:    ELICIA Gutierrez  was at [facility] on 1/20/24-1/23/24. She had appt with provider on 1/31/24. The patient may return to work/school on 2/6/24 with no restrictions. If you have any questions or concerns, or if I can be of further assistance, please do not hesitate to contact me.    Sincerely,  Michaela Cormier, KRISTOFER

## 2024-02-05 NOTE — TELEPHONE ENCOUNTER
Spoke with pt and she is trying to establish care with Dr. Voss; explained to pt that Dr. Voss is not accepting new medicaid pt's right now and to call [phone] to see what provider is accepting new pts; pt verbalized understanding.

## 2024-02-05 NOTE — TELEPHONE ENCOUNTER
Michaela Montesinos, Mariana Bear, LPN  Caller: Unspecified (Today,  2:35 PM)  No exercise restrictions, can start with cardiac rehab if she would like    She can start crestor 20 mg daily        Pt notified   Return to work sent in patient portal.   Rx sent to pharmacy

## 2024-02-05 NOTE — TELEPHONE ENCOUNTER
----- Message from Mariana Cormier LPN sent at 2/5/2024  1:31 PM CST -----  Regarding: NP appt est care  Pt needing to est care. Can you please assist with scheduling? She would prefer todd whitman ok if not. Thank you.

## 2024-02-05 NOTE — TELEPHONE ENCOUNTER
Pt called. She says her job is needing a written release for her to return. She works at [employer].   she says she wants to start working out again and is asking if she has a weight limit.     And she is asking if she needs to be on cholesterol medication for her high cholesterol showing from her labs done on 1/22  Please adivse

## 2024-02-08 ENCOUNTER — CLINICAL SUPPORT (OUTPATIENT)
Dept: SMOKING CESSATION | Facility: CLINIC | Age: 49
End: 2024-02-08

## 2024-02-08 VITALS
WEIGHT: 147 LBS | HEART RATE: 98 BPM | OXYGEN SATURATION: 97 % | HEIGHT: 67 IN | SYSTOLIC BLOOD PRESSURE: 100 MMHG | BODY MASS INDEX: 23.07 KG/M2 | DIASTOLIC BLOOD PRESSURE: 72 MMHG

## 2024-02-08 DIAGNOSIS — F17.200 NICOTINE DEPENDENCE: ICD-10-CM

## 2024-02-08 DIAGNOSIS — F17.200 NICOTINE DEPENDENCE: Primary | ICD-10-CM

## 2024-02-08 PROCEDURE — 99999 PR PBB SHADOW E&M-EST. PATIENT-LVL III: CPT | Mod: PBBFAC,,,

## 2024-02-08 RX ORDER — DM/P-EPHED/ACETAMINOPH/DOXYLAM 30-7.5/3
2 LIQUID (ML) ORAL
Qty: 81 LOZENGE | Refills: 0 | Status: SHIPPED | OUTPATIENT
Start: 2024-02-08 | End: 2024-04-25 | Stop reason: SDUPTHER

## 2024-02-08 RX ORDER — BUPROPION HYDROCHLORIDE 150 MG/1
150 TABLET, EXTENDED RELEASE ORAL 2 TIMES DAILY
Qty: 52 TABLET | Refills: 0 | Status: SHIPPED | OUTPATIENT
Start: 2024-02-08 | End: 2024-03-11 | Stop reason: SDUPTHER

## 2024-02-08 RX ORDER — IBUPROFEN 200 MG
1 TABLET ORAL DAILY
Qty: 28 PATCH | Refills: 0 | Status: SHIPPED | OUTPATIENT
Start: 2024-02-08 | End: 2024-03-11 | Stop reason: SDUPTHER

## 2024-02-08 NOTE — PROGRESS NOTES
Patient presents to the smoking cessation program. She smokes about 18 cigarettes per day. Per Smokerlyzer CO  13  ppm, last smoked 30 minutes prior to meeting. She has Hashimoto disease with an enlarged heart and Thyroid issues. She also smokes a vape occasionally but is willing to stop today. She will begin using 21 mg patches, 2 mg lozenges and 150 mg Wellbutrin SR+ BID. FTND of 6 indicates a high level of tobacco/nicotine dependency; CESD of 22 is preceived as significant level of mental distress or depression at this time. Will be monitored.

## 2024-02-09 ENCOUNTER — TELEPHONE (OUTPATIENT)
Dept: CARDIOLOGY | Facility: CLINIC | Age: 49
End: 2024-02-09
Payer: MEDICAID

## 2024-02-09 RX ORDER — BUPROPION HYDROCHLORIDE 150 MG/1
TABLET, EXTENDED RELEASE ORAL
Qty: 52 TABLET | Refills: 0 | OUTPATIENT
Start: 2024-02-09

## 2024-02-09 NOTE — TELEPHONE ENCOUNTER
Pt called with c/o bp being low. This afternoon is 84/50 and she says she does not feel well. She has already taken all of her PM doses of medication today  Advised proper hydration  I let her know I would discuss with provider on any other recommendations moving forward or possibly spreading medications out .

## 2024-02-09 NOTE — TELEPHONE ENCOUNTER
Refill Routing Note   Medication(s) are not appropriate for processing by [facility] for the following reason(s):        Other    [facility] action(s):  Defer        Medication Therapy Plan: Pharmacy comment: Bupropion is contraindicated in patients with seizure. continue to fill?      Appointments  past 12m or future 3m with PCP    Date Provider   Last Visit   Visit date not found Jaylene Garcia MD   Next Visit   Visit date not found Jaylene Garcia MD   ED visits in past 90 days: 1        Note composed:6:30 PM 02/08/2024

## 2024-02-09 NOTE — TELEPHONE ENCOUNTER
Michaela Montesinos, Mariana Bear, LPN  Caller: Unspecified (Today,  3:36 PM)  Since she has already taken evening meds i would just monitor. Hold tomorrow if remains less than 80 and not feeling well.      The patient has been notified of this information and all questions answered.

## 2024-02-22 ENCOUNTER — CLINICAL SUPPORT (OUTPATIENT)
Dept: SMOKING CESSATION | Facility: CLINIC | Age: 49
End: 2024-02-22

## 2024-02-22 DIAGNOSIS — F17.200 NICOTINE DEPENDENCE: Primary | ICD-10-CM

## 2024-02-22 DIAGNOSIS — I50.9 ACUTE CONGESTIVE HEART FAILURE, UNSPECIFIED HEART FAILURE TYPE: ICD-10-CM

## 2024-02-22 PROCEDURE — 99999 PR PBB SHADOW E&M-EST. PATIENT-LVL II: CPT | Mod: PBBFAC,,,

## 2024-02-22 NOTE — PROGRESS NOTES
Individual Follow-Up Form    2/22/2024    Quit Date: tbd    Clinical Status of Patient: Outpatient    Length of Service: 60 minutes    Continuing Medication: yes  Wellbutrin or Patches    Other Medications: none     Target Symptoms: Withdrawal and medication side effects. The following were  rated moderate (3) to severe (4) on TCRS:  Moderate (3): none  Severe (4): none    Comments: The patient was seen in the clinic for smoking cessation follow up.  She states she smokes 10-15, down from 18-20 cigarettes per day. Per Smokerlyzer CO 10 ppm, last smoked 2 hours prior to meeting.  The patient remains on the prescribed tobacco cessation medication regimen of 150 mg Wellbutrin bid and 21 mg patches without any negative side effects at this time. Session Focus: Orientation, client introductions, completion of TCRS (Tobacco Cessation Rating Scale) learned addiction model, cues/triggers, personal reasons for quitting, medications, goals, quit date discussed in March. She is moving her cigarette pack away from her. GOALS: 1. Notice triggers for each cigarette.  2. Wait 30 min to smoke. The patient denies any abnormal behavioral or mental changes at this time. The patient will continue with  therapy sessions and medication monitoring by CTTS. Prescribed medication management will be by physician.    Diagnosis: F17.210    Next Visit: 2 weeks

## 2024-02-22 NOTE — Clinical Note
The patient was seen in the clinic for smoking cessation follow up.  She states she smokes 10-15, down from 18-20 cigarettes per day. Per Smokerlyzer CO 10 ppm, last smoked 2 hours prior to meeting.  The patient remains on the prescribed tobacco cessation medication regimen of 150 mg Wellbutrin bid and 21 mg patches without any negative side effects at this time. Session Focus: Orientation, client introductions, completion of TCRS (Tobacco Cessation Rating Scale) learned addiction model, cues/triggers, personal reasons for quitting, medications, goals, quit date discussed in March. She is moving her cigarette pack away from her. GOALS: 1. Notice triggers for each cigarette.  2. Wait 30 min to smoke. The patient denies any abnormal behavioral or mental changes at this time. The patient will continue with  therapy sessions and medication monitoring by CTTS. Prescribed medication management will be by physician.

## 2024-02-28 ENCOUNTER — OFFICE VISIT (OUTPATIENT)
Dept: CARDIOLOGY | Facility: CLINIC | Age: 49
End: 2024-02-28
Payer: MEDICAID

## 2024-02-28 ENCOUNTER — LAB VISIT (OUTPATIENT)
Dept: LAB | Facility: HOSPITAL | Age: 49
End: 2024-02-28
Payer: MEDICAID

## 2024-02-28 VITALS
SYSTOLIC BLOOD PRESSURE: 100 MMHG | HEART RATE: 98 BPM | WEIGHT: 149.94 LBS | DIASTOLIC BLOOD PRESSURE: 70 MMHG | HEIGHT: 67 IN | BODY MASS INDEX: 23.53 KG/M2

## 2024-02-28 DIAGNOSIS — I50.9 NEW ONSET OF CONGESTIVE HEART FAILURE: Primary | ICD-10-CM

## 2024-02-28 DIAGNOSIS — I50.9 NEW ONSET OF CONGESTIVE HEART FAILURE: ICD-10-CM

## 2024-02-28 LAB
ANION GAP SERPL CALC-SCNC: 10 MMOL/L (ref 8–16)
BUN SERPL-MCNC: 17 MG/DL (ref 6–20)
CALCIUM SERPL-MCNC: 9.5 MG/DL (ref 8.7–10.5)
CHLORIDE SERPL-SCNC: 99 MMOL/L (ref 95–110)
CO2 SERPL-SCNC: 30 MMOL/L (ref 23–29)
CREAT SERPL-MCNC: 1.4 MG/DL (ref 0.5–1.4)
EST. GFR  (NO RACE VARIABLE): 46 ML/MIN/1.73 M^2
GLUCOSE SERPL-MCNC: 95 MG/DL (ref 70–110)
POTASSIUM SERPL-SCNC: 4 MMOL/L (ref 3.5–5.1)
SODIUM SERPL-SCNC: 139 MMOL/L (ref 136–145)

## 2024-02-28 PROCEDURE — 3074F SYST BP LT 130 MM HG: CPT | Mod: CPTII,,, | Performed by: PHYSICIAN ASSISTANT

## 2024-02-28 PROCEDURE — 80048 BASIC METABOLIC PNL TOTAL CA: CPT | Performed by: PHYSICIAN ASSISTANT

## 2024-02-28 PROCEDURE — 36415 COLL VENOUS BLD VENIPUNCTURE: CPT | Performed by: PHYSICIAN ASSISTANT

## 2024-02-28 PROCEDURE — 1159F MED LIST DOCD IN RCRD: CPT | Mod: CPTII,,, | Performed by: PHYSICIAN ASSISTANT

## 2024-02-28 PROCEDURE — 1160F RVW MEDS BY RX/DR IN RCRD: CPT | Mod: CPTII,,, | Performed by: PHYSICIAN ASSISTANT

## 2024-02-28 PROCEDURE — 3044F HG A1C LEVEL LT 7.0%: CPT | Mod: CPTII,,, | Performed by: PHYSICIAN ASSISTANT

## 2024-02-28 PROCEDURE — 3008F BODY MASS INDEX DOCD: CPT | Mod: CPTII,,, | Performed by: PHYSICIAN ASSISTANT

## 2024-02-28 PROCEDURE — 3078F DIAST BP <80 MM HG: CPT | Mod: CPTII,,, | Performed by: PHYSICIAN ASSISTANT

## 2024-02-28 PROCEDURE — 4010F ACE/ARB THERAPY RXD/TAKEN: CPT | Mod: CPTII,,, | Performed by: PHYSICIAN ASSISTANT

## 2024-02-28 PROCEDURE — 99999 PR PBB SHADOW E&M-EST. PATIENT-LVL III: CPT | Mod: PBBFAC,,, | Performed by: PHYSICIAN ASSISTANT

## 2024-02-28 PROCEDURE — 99214 OFFICE O/P EST MOD 30 MIN: CPT | Mod: S$PBB,,, | Performed by: PHYSICIAN ASSISTANT

## 2024-02-28 PROCEDURE — 99213 OFFICE O/P EST LOW 20 MIN: CPT | Mod: PBBFAC | Performed by: PHYSICIAN ASSISTANT

## 2024-02-28 RX ORDER — FUROSEMIDE 20 MG/1
20 TABLET ORAL 2 TIMES DAILY
Qty: 60 TABLET | Refills: 11
Start: 2024-02-28 | End: 2025-02-27

## 2024-02-28 NOTE — PROGRESS NOTES
HF TCC Provider Note (Follow-up) Consult Note      HPI:  Patient can walk without SOB, back to exercising   Patient sleeps on 2 pillows   Patient wakes up SOB, has to get out of bed, associated cough, sputum none   Palpitations - none   Dizzy, light-headed, pre-syncope or syncope none   Since discharge frequency of performing weights, home weight and weight change 68 kg   Other information felt pertinent to HPI    Jaqueline Gutierrez is a 48 y.o. female with a PMH  has a past medical history of Substance abuse and Thyroid disease. Now systolic HF LVEF 30%    Last visit BB had to be stopped with LH and hypotension    On ARNi BID      Lasix 40 BID, no SOB, exercises regular      Wearing LV       PHYSICAL:   Vitals:    02/28/24 0910   BP: 100/70   Pulse: 98      68 kg    JVD: no,    Heart rhythm: regular  Cardiac murmur: No    S3: no  S4: no  Lungs: clear  Liver span: 10 cm:   Hepatojugular reflux: no  Edema: no,       ASSESSMENT: chronic systolic HF    PLAN:      Patient Instructions:   Instruct the patient to notify this clinic if HH, a physician or an advanced care provider wants to change medication one of their HF medications   Activity and Diet restrictions:   Recommend 2-3 gram sodium restriction and 1500cc- 2000cc fluid restriction.  Encourage physical activity with graded exercise program.  Requested patient to weigh themselves daily, and to notify us if their weight increases by more than 3 lbs in 1 day or 5 lbs in 3 days.    Assigned dry weight on home scale: 68 kg  Medication changes (include current dose and changed dose)  Decrease lasix to 20 mg BID  CHF education done  Smoking cessation education  Off BB for hypotension  Continue ARNi  Echo in 6 weeks for AICD

## 2024-03-07 ENCOUNTER — CLINICAL SUPPORT (OUTPATIENT)
Dept: SMOKING CESSATION | Facility: CLINIC | Age: 49
End: 2024-03-07

## 2024-03-07 DIAGNOSIS — F17.200 NICOTINE DEPENDENCE: Primary | ICD-10-CM

## 2024-03-07 PROCEDURE — 99999 PR PBB SHADOW E&M-EST. PATIENT-LVL II: CPT | Mod: PBBFAC,,,

## 2024-03-07 NOTE — PROGRESS NOTES
Individual Follow-Up Form    3/7/2024    Quit Date: tbd    Clinical Status of Patient: Outpatient    Length of Service: 45 minutes    Continuing Medication: yes  Wellbutrin or Patches    Other Medications: none     Target Symptoms: Withdrawal and medication side effects. The following were  rated moderate (3) to severe (4) on TCRS:  Moderate (3): none  Severe (4): none    Comments: Spoke with patient at length for smoking cessation follow up, she was not able to come into the office due to work.  She states she smokes 15-18 cigarettes per day. The patient remains on the prescribed tobacco cessation medication regimen of 150 mg Wellbutrin bid and 21 mg patches without any negative side effects at this time. Session Focus:  Completion of TCRS (Tobacco Cessation Rating Scale) reviewed strategies, cues, and triggers. Introduced the negative impact of tobacco on health, the health advantages of discontinuing the use of tobacco, time line improved health changes after a quit, withdrawal issues to expect from nicotine and habit, and ways to achieve the goal of a quit. GOALS: 1. TRIGGERS 2. Explore a NEW HOBBY. The patient denies any abnormal behavioral or mental changes at this time. The patient will continue with  therapy sessions and medication monitoring by CTTS. Prescribed medication management will be by physician.    Diagnosis: F17.210    Next Visit: 2 weeks    
[FreeTextEntry1] : Raymundo is a pleasant 34-year-old man with past medical history of alcohol abuse, current smoker with at least 3 clinical events concerning for nocturnal seizures. Unclear if precipitated by alcohol use but the fact that he has 3 events, and the fact that they are nocturnal increases the likelihood that he carries a diagnosis of epilepsy. Suspect frontal lobe seizures however no EEG data. As per dad, used to see Dr. Nickolas Helton MD at [facility] who was not sure if he had a sleep disorder or seizures?? \par \par  Discussed importance of limiting alcohol use and the high risk of serious injury with seizures including but not limited to brain bleed, shoulder dislocation, death. Nocturnal convulsive seizures are associated with SUDEP. Discussed this in detail on 2/10/23\par \par Started lamotrigine and oxcarbazepine. He self-discontinued oxcarbazepine. He was drinking 15 beers a week and sustained another seizure in March 2022 , also in setting of alcohol use. He was found to have a T7 compression fracture. Seen by Dr. Zelaya, recommendation for pain management and physical therapy. . \par \par Doing well now on zonisamide 100 mg daily and lamotrigine 150 mg twice a day. Will continue. Has not gotten EEG yet. \par \par Zonisamide level was low (<2). Discussed importance of compliance - and risks including worsening of fracture were he to have a convulsive seizure. Will get AED levels.

## 2024-03-07 NOTE — Clinical Note
Spoke with patient at length for smoking cessation follow up, she was not able to come into the office due to work.  She states she smokes 15-18 cigarettes per day. The patient remains on the prescribed tobacco cessation medication regimen of 150 mg Wellbutrin bid and 21 mg patches without any negative side effects at this time. Session Focus:  Completion of TCRS (Tobacco Cessation Rating Scale) reviewed strategies, cues, and triggers. Introduced the negative impact of tobacco on health, the health advantages of discontinuing the use of tobacco, time line improved health changes after a quit, withdrawal issues to expect from nicotine and habit, and ways to achieve the goal of a quit. GOALS: 1. TRIGGERS 2. Explore a NEW HOBBY. The patient denies any abnormal behavioral or mental changes at this time. The patient will continue with  therapy sessions and medication monitoring by CTTS. Prescribed medication management will be by physician.

## 2024-03-08 DIAGNOSIS — R11.0 NAUSEA: Primary | ICD-10-CM

## 2024-03-08 RX ORDER — ONDANSETRON 4 MG/1
8 TABLET, ORALLY DISINTEGRATING ORAL 2 TIMES DAILY
Qty: 30 TABLET | Refills: 0 | Status: SHIPPED | OUTPATIENT
Start: 2024-03-08

## 2024-03-11 ENCOUNTER — TELEPHONE (OUTPATIENT)
Dept: SMOKING CESSATION | Facility: CLINIC | Age: 49
End: 2024-03-11
Payer: MEDICAID

## 2024-03-11 ENCOUNTER — CLINICAL SUPPORT (OUTPATIENT)
Dept: SMOKING CESSATION | Facility: CLINIC | Age: 49
End: 2024-03-11

## 2024-03-11 DIAGNOSIS — F17.200 NICOTINE DEPENDENCE: Primary | ICD-10-CM

## 2024-03-11 PROCEDURE — 99999 PR PBB SHADOW E&M-EST. PATIENT-LVL II: CPT | Mod: PBBFAC,,,

## 2024-03-11 RX ORDER — BUPROPION HYDROCHLORIDE 150 MG/1
150 TABLET, EXTENDED RELEASE ORAL 2 TIMES DAILY
Qty: 52 TABLET | Refills: 0 | Status: SHIPPED | OUTPATIENT
Start: 2024-03-11 | End: 2024-04-16

## 2024-03-11 RX ORDER — IBUPROFEN 200 MG
1 TABLET ORAL DAILY
Qty: 28 PATCH | Refills: 0 | Status: SHIPPED | OUTPATIENT
Start: 2024-03-11 | End: 2024-04-11 | Stop reason: SDUPTHER

## 2024-03-11 NOTE — Clinical Note
The patient was seen in the clinic for smoking cessation follow up. She states she smokes 6-8 cigarettes per day. Per [employer] CO 9 ppm, last smoked 2 hours prior to meeting. She has picked a quit date of 4/1/24 and is excited and scared as this will be her first time quitting. The patient remains on the prescribed tobacco cessation medication regimen of 150 mg Wellbutrin bid, 2 mg lozenges and 21 mg patches without any negative side effects at this time. Session Focus: Completion of TCRS (Tobacco Cessation Rating Scale) reviewed strategies, cues, and triggers. Introduced the negative impact of tobacco on health, the health advantages of discontinuing the use of tobacco, time line improved health changes after a quit, withdrawal issues to expect from nicotine and habit, and ways to achieve the goal of a quit. GOALS: 1. Breathing exercises. 2. Plan for her quit. She may come to groups after her quit. The patient denies any abnormal behavioral or mental changes at this time.

## 2024-03-11 NOTE — TELEPHONE ENCOUNTER
Attempted to call patient for their smoking cessation appointment. Left a message with my contact information to reschedule the appointment. Poppy Stevens [phone].

## 2024-03-11 NOTE — PROGRESS NOTES
Individual Follow-Up Form    3/11/2024    Quit Date: tbd    Clinical Status of Patient: Outpatient    Length of Service: 60 minutes    Continuing Medication: yes  Wellbutrin or Patches    Other Medications: none     Target Symptoms: Withdrawal and medication side effects. The following were  rated moderate (3) to severe (4) on TCRS:  Moderate (3): none  Severe (4): none    Comments: The patient was seen in the clinic for smoking cessation follow up. She states she smokes 6-8 cigarettes per day. Per [facility]oker[facility] CO 9 ppm, last smoked 2 hours prior to meeting. She has picked a quit date of 4/1/24 and is excited and scared as this will be her first time quitting. The patient remains on the prescribed tobacco cessation medication regimen of 150 mg Wellbutrin bid, 2 mg lozenges and 21 mg patches without any negative side effects at this time. Session Focus: Completion of TCRS (Tobacco Cessation Rating Scale) reviewed strategies, cues, and triggers. Introduced the negative impact of tobacco on health, the health advantages of discontinuing the use of tobacco, time line improved health changes after a quit, withdrawal issues to expect from nicotine and habit, and ways to achieve the goal of a quit. GOALS: 1. Breathing exercises. 2. Plan for her quit. She may come to groups after her quit. The patient denies any abnormal behavioral or mental changes at this time. The patient will continue with  therapy sessions and medication monitoring by CTTS. Prescribed medication management will be by physician.      Diagnosis: F17.210    Next Visit: 2 weeks

## 2024-03-14 ENCOUNTER — TELEPHONE (OUTPATIENT)
Dept: CARDIOLOGY | Facility: CLINIC | Age: 49
End: 2024-03-14
Payer: MEDICAID

## 2024-03-14 NOTE — TELEPHONE ENCOUNTER
Pt called. States her bp readings have been running low again. Feeling lightheaded at times and a little fatigued and winded with exertion.   Bp 88/57 and 83/56.   So far has taken all of her AM meds    Please advise

## 2024-03-14 NOTE — TELEPHONE ENCOUNTER
Michaela Montesinos, Mariana Bear, LPN  Caller: Unspecified (Today,  1:41 PM)  Stop entresto        I called pt. The patient has been notified of this information and all questions answered.

## 2024-03-28 ENCOUNTER — CLINICAL SUPPORT (OUTPATIENT)
Dept: SMOKING CESSATION | Facility: CLINIC | Age: 49
End: 2024-03-28
Payer: COMMERCIAL

## 2024-03-28 DIAGNOSIS — F17.200 NICOTINE DEPENDENCE: Primary | ICD-10-CM

## 2024-03-28 PROCEDURE — 99999 PR PBB SHADOW E&M-EST. PATIENT-LVL II: CPT | Mod: PBBFAC,,,

## 2024-03-28 NOTE — Clinical Note
Spoke with patient at length for smoking cessation follow up. She states she smokes 5 cigarettes per day. The patient remains on the prescribed tobacco cessation medication regimen of 21 mg patches, 150 mg Wellbutrin bid and 2 mg lozenges without any negative side effects at this time. [facility] first denied her medications, after she called Medicaid and found out they are covered and went to [facility] to get it correct with a different brand, which she will be sending me the information for future reference. Session Focus: Completion of TCRS (Tobacco Cessation Rating Scale) reviewed strategies, habitual behavior, high risks situations, understanding urges and cravings, stress and relaxation with open discussion and additional interventions, Introduced lapses, relapses, understanding them and analyzing the situation of a lapse, conflict issues that may be linked to a lapse. GOALS: 1. Getting ready for her quit on 1-1-24.2.      The patient denies any abnormal behavioral or mental changes at this time.

## 2024-03-28 NOTE — PROGRESS NOTES
Individual Follow-Up Form    3/28/2024    Quit Date: tbd    Clinical Status of Patient: Outpatient    Length of Service: 60 minutes    Continuing Medication: yes  Patches or Nicotine Lozenges    Other Medications: none     Target Symptoms: Withdrawal and medication side effects. The following were  rated moderate (3) to severe (4) on TCRS:  Moderate (3): none  Severe (4): none    Comments: Spoke with patient at length for smoking cessation follow up. She states she smokes 5 cigarettes per day. The patient remains on the prescribed tobacco cessation medication regimen of 21 mg patches, 150 mg Wellbutrin bid and 2 mg lozenges without any negative side effects at this time. [facility] first denied her medications, after she called Medicaid and found out they are covered and went to [facility] to get it correct with a different brand, which she will be sending me the information for future reference. Session Focus: Completion of TCRS (Tobacco Cessation Rating Scale) reviewed strategies, habitual behavior, high risks situations, understanding urges and cravings, stress and relaxation with open discussion and additional interventions, Introduced lapses, relapses, understanding them and analyzing the situation of a lapse, conflict issues that may be linked to a lapse. GOALS: 1. Getting ready for her quit on 1-1-24.2.      The patient denies any abnormal behavioral or mental changes at this time. The patient will continue with  therapy sessions and medication monitoring by CTTS. Prescribed medication management will be by physician.      Diagnosis: F17.210    Next Visit: 2 weeks

## 2024-04-01 ENCOUNTER — TELEPHONE (OUTPATIENT)
Dept: SMOKING CESSATION | Facility: CLINIC | Age: 49
End: 2024-04-01
Payer: MEDICAID

## 2024-04-01 NOTE — TELEPHONE ENCOUNTER
Attempted to call the patient for her smoking cessation appointment. Left a voicemai message with my contact information.

## 2024-04-05 DIAGNOSIS — R07.9 CHEST PAIN, UNSPECIFIED TYPE: Primary | ICD-10-CM

## 2024-04-05 RX ORDER — NITROGLYCERIN 0.4 MG/1
0.4 TABLET SUBLINGUAL EVERY 5 MIN PRN
Qty: 25 TABLET | Refills: 1 | Status: SHIPPED | OUTPATIENT
Start: 2024-04-05 | End: 2025-04-05

## 2024-04-10 ENCOUNTER — TELEPHONE (OUTPATIENT)
Dept: CARDIOLOGY | Facility: CLINIC | Age: 49
End: 2024-04-10
Payer: MEDICAID

## 2024-04-10 ENCOUNTER — HOSPITAL ENCOUNTER (OUTPATIENT)
Dept: CARDIOLOGY | Facility: HOSPITAL | Age: 49
Discharge: HOME | End: 2024-04-10
Attending: PHYSICIAN ASSISTANT
Payer: MEDICAID

## 2024-04-10 VITALS
HEIGHT: 67 IN | SYSTOLIC BLOOD PRESSURE: 100 MMHG | WEIGHT: 149 LBS | DIASTOLIC BLOOD PRESSURE: 70 MMHG | HEART RATE: 80 BPM | BODY MASS INDEX: 23.39 KG/M2

## 2024-04-10 DIAGNOSIS — I50.9 NEW ONSET OF CONGESTIVE HEART FAILURE: ICD-10-CM

## 2024-04-10 DIAGNOSIS — I50.42 CHRONIC COMBINED SYSTOLIC AND DIASTOLIC HEART FAILURE: Primary | ICD-10-CM

## 2024-04-10 LAB
AORTIC ROOT ANNULUS: 2.76 CM
ASCENDING AORTA: 2.53 CM
AV INDEX (PROSTH): 0.45
AV MEAN GRADIENT: 6 MMHG
AV PEAK GRADIENT: 11 MMHG
AV VALVE AREA BY VELOCITY RATIO: 1.22 CM²
AV VALVE AREA: 1.37 CM²
AV VELOCITY RATIO: 0.41
BSA FOR ECHO PROCEDURE: 1.79 M2
CV ECHO LV RWT: 0.29 CM
DOP CALC AO PEAK VEL: 1.65 M/S
DOP CALC AO VTI: 25.5 CM
DOP CALC LVOT AREA: 3 CM2
DOP CALC LVOT DIAMETER: 1.96 CM
DOP CALC LVOT PEAK VEL: 0.67 M/S
DOP CALC LVOT STROKE VOLUME: 34.98 CM3
DOP CALC RVOT PEAK VEL: 0.56 M/S
DOP CALC RVOT VTI: 10.8 CM
DOP CALCLVOT PEAK VEL VTI: 11.6 CM
ECHO LV POSTERIOR WALL: 1.03 CM (ref 0.6–1.1)
EJECTION FRACTION: 20 %
FRACTIONAL SHORTENING: 9 % (ref 28–44)
INTERVENTRICULAR SEPTUM: 0.84 CM (ref 0.6–1.1)
IVRT: 97.05 MSEC
LA MAJOR: 4.91 CM
LA MINOR: 5.47 CM
LA WIDTH: 4 CM
LEFT ATRIUM SIZE: 5.03 CM
LEFT ATRIUM VOLUME INDEX MOD: 38.7 ML/M2
LEFT ATRIUM VOLUME INDEX: 49.7 ML/M2
LEFT ATRIUM VOLUME MOD: 68.97 CM3
LEFT ATRIUM VOLUME: 88.5 CM3
LEFT INTERNAL DIMENSION IN SYSTOLE: 6.57 CM (ref 2.1–4)
LEFT VENTRICLE DIASTOLIC VOLUME INDEX: 152.5 ML/M2
LEFT VENTRICLE DIASTOLIC VOLUME: 271.45 ML
LEFT VENTRICLE MASS INDEX: 174 G/M2
LEFT VENTRICLE SYSTOLIC VOLUME INDEX: 124.4 ML/M2
LEFT VENTRICLE SYSTOLIC VOLUME: 221.45 ML
LEFT VENTRICULAR INTERNAL DIMENSION IN DIASTOLE: 7.19 CM (ref 3.5–6)
LEFT VENTRICULAR MASS: 310.09 G
LVOT MG: 0.89 MMHG
LVOT MV: 0.44 CM/S
MV PEAK A VEL: 1.11 M/S
PISA TR MAX VEL: 2.71 M/S
PV MEAN GRADIENT: 1 MMHG
PV PEAK GRADIENT: 3 MMHG
PV PEAK VELOCITY: 0.88 M/S
RA MAJOR: 3.98 CM
RA PRESSURE ESTIMATED: 3 MMHG
RA WIDTH: 2.87 CM
RIGHT VENTRICULAR END-DIASTOLIC DIMENSION: 2.92 CM
RV TB RVSP: 6 MMHG
SINUS: 2.16 CM
STJ: 2.03 CM
TDI LATERAL: 0.06 M/S
TDI SEPTAL: 0.06 M/S
TDI: 0.06 M/S
TR MAX PG: 29 MMHG
TV REST PULMONARY ARTERY PRESSURE: 32 MMHG
Z-SCORE OF LEFT VENTRICULAR DIMENSION IN END DIASTOLE: 3.79
Z-SCORE OF LEFT VENTRICULAR DIMENSION IN END SYSTOLE: 6.09

## 2024-04-10 PROCEDURE — 93306 TTE W/DOPPLER COMPLETE: CPT | Mod: 26,,, | Performed by: INTERNAL MEDICINE

## 2024-04-10 PROCEDURE — 93306 TTE W/DOPPLER COMPLETE: CPT

## 2024-04-10 NOTE — TELEPHONE ENCOUNTER
----- Message from SANDRA King sent at 4/10/2024  2:30 PM CDT -----  Refer to EP for [facility] as LVEF still 20%

## 2024-04-10 NOTE — TELEPHONE ENCOUNTER
The patient has been notified of this information and all questions answered.  Appt with EP scheduled for icd

## 2024-04-11 ENCOUNTER — CLINICAL SUPPORT (OUTPATIENT)
Dept: SMOKING CESSATION | Facility: CLINIC | Age: 49
End: 2024-04-11

## 2024-04-11 DIAGNOSIS — F17.200 NICOTINE DEPENDENCE: Primary | ICD-10-CM

## 2024-04-11 PROCEDURE — 99999 PR PBB SHADOW E&M-EST. PATIENT-LVL II: CPT | Mod: PBBFAC,,,

## 2024-04-11 RX ORDER — IBUPROFEN 200 MG
1 TABLET ORAL DAILY
Qty: 28 PATCH | Refills: 0 | Status: SHIPPED | OUTPATIENT
Start: 2024-04-11 | End: 2024-05-30 | Stop reason: SDUPTHER

## 2024-04-11 NOTE — PROGRESS NOTES
Individual Follow-Up Form    4/11/2024    Quit Date: tbd    Clinical Status of Patient: Outpatient    Length of Service: 45 minutes    Continuing Medication: yes  Wellbutrin, Patches, or Nicotine Lozenges    Other Medications: none     Target Symptoms: Withdrawal and medication side effects. The following were  rated moderate (3) to severe (4) on TCRS:  Moderate (3): none  Severe (4): none    Comments: Spoke with patient at length for smoking cessation follow up. She states she smokes 1-2 cigarettes per day. The patient remains on the prescribed tobacco cessation medication regimen of 150 Wellbutrin bid, 21 mg patches and 2 mg lozenges without any negative side effects at this time. Session Focus: Completion of TCRS (Tobacco Cessation Rating Scale) reviewed strategies, habitual behavior, high risks situations, understanding urges and cravings, stress and relaxation with open discussion and additional interventions, Introduced lapses, relapses, understanding them and analyzing the situation of a lapse, conflict issues that may be linked to a lapse.  Crosswords coloring. Croshaying. GOALS: 1. 24 hour quit. 2. Let herself run out of cigarettes.  The patient denies any abnormal behavioral or mental changes at this time. The patient will continue with  therapy sessions and medication monitoring by CTTS. Prescribed medication management will be by physician.    Diagnosis: F17.210    Next Visit: 2 weeks

## 2024-04-11 NOTE — Clinical Note
Spoke with patient at length for smoking cessation follow up. She states she smokes 1-2 cigarettes per day. The patient remains on the prescribed tobacco cessation medication regimen of 150 Wellbutrin bid, 21 mg patches and 2 mg lozenges without any negative side effects at this time. Session Focus: Completion of TCRS (Tobacco Cessation Rating Scale) reviewed strategies, habitual behavior, high risks situations, understanding urges and cravings, stress and relaxation with open discussion and additional interventions, Introduced lapses, relapses, understanding them and analyzing the situation of a lapse, conflict issues that may be linked to a lapse.  Crosswords coloring. Croshaying. GOALS: 1. 24 hour quit. 2. Let herself run out of cigarettes.  The patient denies any abnormal behavioral or mental changes at this time. The patient will continue with  therapy sessions and medication monitoring by CTTS. Prescribed medication management will be by physician.

## 2024-04-16 DIAGNOSIS — F17.200 NICOTINE DEPENDENCE: ICD-10-CM

## 2024-04-16 RX ORDER — BUPROPION HYDROCHLORIDE 150 MG/1
150 TABLET, EXTENDED RELEASE ORAL 2 TIMES DAILY
Qty: 52 TABLET | Refills: 0 | Status: SHIPPED | OUTPATIENT
Start: 2024-04-16 | End: 2024-05-30 | Stop reason: SDUPTHER

## 2024-04-19 ENCOUNTER — PATIENT MESSAGE (OUTPATIENT)
Dept: CARDIOLOGY | Facility: CLINIC | Age: 49
End: 2024-04-19
Payer: MEDICAID

## 2024-04-19 DIAGNOSIS — I50.42 CHRONIC COMBINED SYSTOLIC AND DIASTOLIC HEART FAILURE: Primary | ICD-10-CM

## 2024-04-22 ENCOUNTER — OFFICE VISIT (OUTPATIENT)
Dept: CARDIOLOGY | Facility: CLINIC | Age: 49
End: 2024-04-22
Payer: MEDICAID

## 2024-04-22 ENCOUNTER — HOSPITAL ENCOUNTER (OUTPATIENT)
Dept: CARDIOLOGY | Facility: HOSPITAL | Age: 49
Discharge: HOME | End: 2024-04-22
Attending: INTERNAL MEDICINE
Payer: MEDICAID

## 2024-04-22 VITALS
BODY MASS INDEX: 23.63 KG/M2 | HEART RATE: 68 BPM | SYSTOLIC BLOOD PRESSURE: 94 MMHG | HEIGHT: 67 IN | DIASTOLIC BLOOD PRESSURE: 60 MMHG | WEIGHT: 150.56 LBS

## 2024-04-22 DIAGNOSIS — I50.42 CHRONIC COMBINED SYSTOLIC AND DIASTOLIC HEART FAILURE: ICD-10-CM

## 2024-04-22 DIAGNOSIS — I35.0 NONRHEUMATIC AORTIC VALVE STENOSIS: ICD-10-CM

## 2024-04-22 DIAGNOSIS — Z72.0 TOBACCO USE: ICD-10-CM

## 2024-04-22 DIAGNOSIS — Z45.02 ICD (IMPLANTABLE CARDIOVERTER-DEFIBRILLATOR) BATTERY DEPLETION: ICD-10-CM

## 2024-04-22 DIAGNOSIS — F41.1 ANXIETY REACTION: ICD-10-CM

## 2024-04-22 DIAGNOSIS — I44.7 LBBB (LEFT BUNDLE BRANCH BLOCK): ICD-10-CM

## 2024-04-22 DIAGNOSIS — I50.9 NEW ONSET OF CONGESTIVE HEART FAILURE: Primary | ICD-10-CM

## 2024-04-22 DIAGNOSIS — Z01.818 PREOP TESTING: Primary | ICD-10-CM

## 2024-04-22 PROCEDURE — 4010F ACE/ARB THERAPY RXD/TAKEN: CPT | Mod: CPTII,,, | Performed by: INTERNAL MEDICINE

## 2024-04-22 PROCEDURE — 99999 PR PBB SHADOW E&M-EST. PATIENT-LVL IV: CPT | Mod: PBBFAC,,, | Performed by: INTERNAL MEDICINE

## 2024-04-22 PROCEDURE — 3008F BODY MASS INDEX DOCD: CPT | Mod: CPTII,,, | Performed by: INTERNAL MEDICINE

## 2024-04-22 PROCEDURE — 99205 OFFICE O/P NEW HI 60 MIN: CPT | Mod: S$PBB,,, | Performed by: INTERNAL MEDICINE

## 2024-04-22 PROCEDURE — 1159F MED LIST DOCD IN RCRD: CPT | Mod: CPTII,,, | Performed by: INTERNAL MEDICINE

## 2024-04-22 PROCEDURE — 3074F SYST BP LT 130 MM HG: CPT | Mod: CPTII,,, | Performed by: INTERNAL MEDICINE

## 2024-04-22 PROCEDURE — 93005 ELECTROCARDIOGRAM TRACING: CPT

## 2024-04-22 PROCEDURE — 1160F RVW MEDS BY RX/DR IN RCRD: CPT | Mod: CPTII,,, | Performed by: INTERNAL MEDICINE

## 2024-04-22 PROCEDURE — 93010 ELECTROCARDIOGRAM REPORT: CPT | Mod: ,,, | Performed by: INTERNAL MEDICINE

## 2024-04-22 PROCEDURE — 99214 OFFICE O/P EST MOD 30 MIN: CPT | Mod: PBBFAC,25 | Performed by: INTERNAL MEDICINE

## 2024-04-22 PROCEDURE — 3044F HG A1C LEVEL LT 7.0%: CPT | Mod: CPTII,,, | Performed by: INTERNAL MEDICINE

## 2024-04-22 PROCEDURE — 3078F DIAST BP <80 MM HG: CPT | Mod: CPTII,,, | Performed by: INTERNAL MEDICINE

## 2024-04-22 NOTE — PROGRESS NOTES
Subjective:   Patient ID:  Jaqueline Gutierrez is a 49 y.o. female     Chief complaint:  CHF/left bundle-branch block    HPI  New patient to me. (04/22/2024 )  Referred by Ms hutchison for evaluation and management of ICD implant   --   49-year-old woman, CD with the occluded the RCA but no MI symptoms.  Extensive collateralization.  Triple-vessel disease.  Was doing well until sometime late 2023.  Had ECG.  Was noted to have left bundle-branch block.  Prior ECG poison 2021 and had normal QRS duration.  On 12/27/2023 admitted with acute CHF.  Treated and sent home feeling better.  EF was about 30%.  Has been treated for several months but the EF actually lower despite patient feeling better.  I have reviewed the actual image of the ECG tracing obtained today and it shows NSR with left bundle branch block with sharp early intrinsicoid and very delayed late intrinsicoid.  QRS duration is 180 milliseconds.  Patient feels reasonably well and can function but is definitely not as energetic as she was 6 months ago.     Other findings of relevance is that she has mild aortic valve sclerosis with mild stenosis.    The chart mentions substance abuse.  Asked patient about this and especially about IV and or intranasal use.  Said she never used IV drugs.  In the past, she has occasionally used intranasal administration.  However this has not happened recently.  At this time she may use CBD products.  She has discontinued smoking 9 days ago.  She is quite anxious about her condition is not able to wrap her head about how this all developed.  Current Outpatient Medications   Medication Sig Dispense Refill    aspirin (ECOTRIN) 81 MG EC tablet Take 1 tablet (81 mg total) by mouth once daily. 90 tablet 3    buPROPion (WELLBUTRIN SR) 150 MG TBSR 12 hr tablet Take 1 tablet (150 mg total) by mouth 2 (two) times daily. 52 tablet 0    cetirizine (ZYRTEC) 10 MG tablet Take 1 tablet by mouth every morning.      furosemide (LASIX) 20 MG  tablet Take 1 tablet (20 mg total) by mouth 2 (two) times daily. 60 tablet 11    nicotine (NICODERM CQ) 21 mg/24 hr Place 1 patch onto the skin once daily. Dispense Clear patches please. Fill Medicaid 28 patch 0    nicotine polacrilex 2 MG Lozg Take 1 lozenge (2 mg total) by mouth as needed (as needed). Dispense MINIs please. DO NOT CHEW UP. Can take 1-2 per hour in place of a cigarette for breakthrough cravings in place of a cigarette. 81 lozenge 0    nitroGLYCERIN (NITROSTAT) 0.4 MG SL tablet Place 1 tablet (0.4 mg total) under the tongue every 5 (five) minutes as needed for Chest pain. 25 tablet 1    ondansetron (ZOFRAN-ODT) 4 MG TbDL Take 2 tablets (8 mg total) by mouth 2 (two) times daily. 30 tablet 0    rosuvastatin (CRESTOR) 20 MG tablet Take 1 tablet (20 mg total) by mouth once daily. 90 tablet 3    sacubitriL-valsartan (ENTRESTO) 24-26 mg per tablet Take 1 tablet by mouth 2 (two) times daily. (Patient not taking: Reported on 4/22/2024) 180 tablet 0     No current facility-administered medications for this visit.       Review of Systems     Constitutional: Reviewed  for decreased appetite, weight gain and weight loss.   HENT: Reviewed for nosebleeds.    Eyes:  Reviewed for blurred vision and visual disturbance.   Cardiovascular: Reviewed for chest pain, claudication, cyanosis,dyspnea on exertion, leg swelling, orthopnea,paroxysmal nocturnal dyspnearregular heartbeats, palpitations, near-syncope, and syncope.   Respiratory: Reviewed for cough, shortness of breath, wheezing, sleep disturbances due to breathing and snoring, .    Endocrine: Reviewed for heat intolerance.   Hematologic/Lymphatic: Reviewed for easy bruisability/bleeding.   Skin: Reviewed for rash.   Musculoskeletal: Reviewed for muscle weakness and myalgias.   Gastrointestinal: Reviewed for abdominal pain, anorexia, melena, nausea and vomiting.   Genitourinary: Reviewed for menorrhagia, frequency, nocturia and incontinence.   Neurological:  Reviewed for excessive daytime sleepiness, dizziness, vertigo, weakness, headaches, loss of balance and seizures,   Psychiatric/Behavioral:  Reviewed for insomnia, altered mental status, depression, anxiety and nervousness.       All symptoms reviewed above were negative except for palpitations and heartburn, headaches, lightheadedness, neck pain, anxiety.     Social History     Tobacco Use   Smoking Status Every Day    Current packs/day: 0.50    Average packs/day: 0.5 packs/day for 39.3 years (19.7 ttl pk-yrs)    Types: Cigarettes    Start date: 1985    Passive exposure: Current   Smokeless Tobacco Never       reports current alcohol use.   Past Medical History:   Diagnosis Date    Elevated troponin 01/21/2024    Substance abuse     Thyroid disease      No family history on file.  Social History     Socioeconomic History    Marital status:    Tobacco Use    Smoking status: Every Day     Current packs/day: 0.50     Average packs/day: 0.5 packs/day for 39.3 years (19.7 ttl pk-yrs)     Types: Cigarettes     Start date: 1985     Passive exposure: Current    Smokeless tobacco: Never   Substance and Sexual Activity    Alcohol use: Yes     Comment: occasional    Drug use: Yes     Types: Marijuana, Methamphetamines     Social Determinants of Health     Financial Resource Strain: High Risk (4/21/2024)    Overall Financial Resource Strain (CARDIA)     Difficulty of Paying Living Expenses: Hard   Food Insecurity: Food Insecurity Present (4/21/2024)    Hunger Vital Sign     Worried About Running Out of Food in the Last Year: Often true     Ran Out of Food in the Last Year: Often true   Transportation Needs: Unmet Transportation Needs (4/21/2024)    PRAPARE - Transportation     Lack of Transportation (Medical): No     Lack of Transportation (Non-Medical): Yes   Physical Activity: Sufficiently Active (4/21/2024)    Exercise Vital Sign     Days of Exercise per Week: 5 days     Minutes of Exercise per Session: 30 min    Stress: Stress Concern Present (2024)    [employer] - Occupational Stress Questionnaire     Feeling of Stress : To some extent   Social Connections: Unknown (2024)    Social Connection and Isolation Panel [NHANES]     Frequency of Communication with Friends and Family: Three times a week     Frequency of Social Gatherings with Friends and Family: More than three times a week     Active Member of Clubs or Organizations: No     Attends Club or Organization Meetings: Never     Marital Status: Living with partner   Housing Stability: High Risk (2024)    Housing Stability Vital Sign     Unable to Pay for Housing in the Last Year: Yes     Past Surgical History:   Procedure Laterality Date    CATHETERIZATION OF BOTH LEFT AND RIGHT HEART N/A 2024    Procedure: CATHETERIZATION, HEART, BOTH LEFT AND RIGHT;  Surgeon: Henry Menard MD;  Location: [facility] CATH LAB;  Service: Cardiology;  Laterality: N/A;     SECTION      HYSTERECTOMY      KNEE SURGERY         Objective:   Physical Exam  Vitals and nursing note reviewed.   Constitutional:       Appearance: She is well-developed.   HENT:      Head: Normocephalic and atraumatic.      Right Ear: External ear normal.      Left Ear: External ear normal.   Neck:      Thyroid: No thyromegaly.   Cardiovascular:      Rate and Rhythm: Normal rate and regular rhythm.      Pulses: Intact distal pulses.           Carotid pulses are 2+ on the right side and 2+ on the left side.       Radial pulses are 2+ on the right side and 2+ on the left side.        Dorsalis pedis pulses are 2+ on the right side and 2+ on the left side.        Posterior tibial pulses are 2+ on the right side and 2+ on the left side.      Heart sounds: Normal heart sounds. No midsystolic click and no opening snap. No murmur heard.     No friction rub. No gallop. No S3 or S4 sounds.   Pulmonary:      Effort: Pulmonary effort is normal.      Breath sounds: Normal  "breath sounds.   Abdominal:      General: There is no distension.      Palpations: Abdomen is soft.      Tenderness: There is no abdominal tenderness.   Musculoskeletal:      Cervical back: Normal range of motion and neck supple.      Right lower leg: No swelling.      Left lower leg: No swelling.      Right ankle: No swelling.      Left ankle: No swelling.   Skin:     General: Skin is warm.      Findings: No rash.      Nails: There is no clubbing.   Neurological:      Mental Status: She is alert and oriented to person, place, and time.      Cranial Nerves: No cranial nerve deficit.      Gait: Gait normal.   Psychiatric:         Speech: Speech normal.         Behavior: Behavior normal.         Thought Content: Thought content normal.       BP 94/60 (BP Location: Right arm, Patient Position: Sitting)   Pulse 68   Ht 5' 7" (1.702 m)   Wt 68.3 kg (150 lb 9.2 oz)   BMI 23.58 kg/m²          Results for orders placed during the hospital encounter of 04/10/24    Echo    Interpretation Summary    Left Ventricle: The left ventricle is severely dilated. Normal wall thickness. There is eccentric hypertrophy. Regional wall motion abnormalities present. Septal motion is consistent with bundle branch block. There is severely reduced systolic function with a visually estimated ejection fraction of 15 - 20%. Ejection fraction by visual approximation is 20%.    Right Ventricle: Normal right ventricular cavity size. Wall thickness is normal. Right ventricle wall motion  is normal. Systolic function is normal.    Left Atrium: Left atrium is severely dilated.    Aortic Valve: The aortic valve is a trileaflet valve. There is moderate stenosis. Aortic valve area by VTI is 1.37 cm². Aortic valve peak velocity is 1.65 m/s. Mean gradient is 6 mmHg. The dimensionless index is 0.45.    Mitral Valve: There is mild to moderate regurgitation.    Tricuspid Valve: There is mild regurgitation.  No pulmonary hypertension.    IVC/SVC: Normal " venous pressure at 3 mmHg.    WBC   Date Value Ref Range Status   01/22/2024 5.77 3.90 - 12.70 K/uL Final     Hematocrit   Date Value Ref Range Status   01/22/2024 42.5 37.0 - 48.5 % Final     Hemoglobin   Date Value Ref Range Status   01/22/2024 14.6 12.0 - 16.0 g/dL Final     Lab Results   Component Value Date     01/22/2024     Lab Results   Component Value Date    CREATININE 1.4 02/28/2024    EGFRNORACEVR 46 (A) 02/28/2024    K 4.0 02/28/2024     Lab Results   Component Value Date    BNP 3,195 (H) 01/20/2024         Assessment:    She is good candidate for CRT.  She is also a candidate for ICD/prevention of SCD.  1. New onset of congestive heart failure    2. Chronic combined systolic and diastolic heart failure    3. LBBB (left bundle branch block)    4. Nonrheumatic aortic valve stenosis    5. Tobacco use    6. Anxiety reaction        Plan:    I spent a long time discussing cardiomyopathy, sudden cardiac death, left bundle-branch block (including proximal versus distal), CRT aortic valve sclerosis/aortic stenosis, the risk of intracardiac device infections should AV and or intranasal drugs be used etc.  I recommended CRT D implantation.  She agrees to proceed.  I have discussed the procedure in detail with the patient. I described its benefits and risks. I reviewed alternative therapies and discussed their potential value. The patient was given ample opportunity to express concerns and ask questions and I provided appropriate responses and  answers to such.The patient understands and agrees to proceed.  Consent form was signed  by patient and myself and appropriately witnessed.     Orders Placed This Encounter   Procedures    BNP     Standing Status:   Future     Standing Expiration Date:   6/21/2025    NT-Pro Natriuretic Peptide     Standing Status:   Future     Standing Expiration Date:   7/21/2025    Holter monitor - 48 hour     Standing Status:   Future     Standing Expiration Date:   4/22/2025      Order Specific Question:   Release to patient     Answer:   Immediate     Follow up if symptoms worsen or fail to improve, for post work up, post op.  Medications Discontinued During This Encounter   Medication Reason    omeprazole (PRILOSEC) 20 MG capsule Patient no longer taking    fluticasone propionate (FLONASE) 50 mcg/actuation nasal spray Patient no longer taking    albuterol (PROVENTIL/VENTOLIN HFA) 90 mcg/actuation inhaler Patient no longer taking     Outpatient Encounter Medications as of 4/22/2024   Medication Sig Dispense Refill    aspirin (ECOTRIN) 81 MG EC tablet Take 1 tablet (81 mg total) by mouth once daily. 90 tablet 3    buPROPion (WELLBUTRIN SR) 150 MG TBSR 12 hr tablet Take 1 tablet (150 mg total) by mouth 2 (two) times daily. 52 tablet 0    cetirizine (ZYRTEC) 10 MG tablet Take 1 tablet by mouth every morning.      furosemide (LASIX) 20 MG tablet Take 1 tablet (20 mg total) by mouth 2 (two) times daily. 60 tablet 11    nicotine (NICODERM CQ) 21 mg/24 hr Place 1 patch onto the skin once daily. Dispense Clear patches please. Fill Medicaid 28 patch 0    nicotine polacrilex 2 MG Lozg Take 1 lozenge (2 mg total) by mouth as needed (as needed). Dispense MINIs please. DO NOT CHEW UP. Can take 1-2 per hour in place of a cigarette for breakthrough cravings in place of a cigarette. 81 lozenge 0    nitroGLYCERIN (NITROSTAT) 0.4 MG SL tablet Place 1 tablet (0.4 mg total) under the tongue every 5 (five) minutes as needed for Chest pain. 25 tablet 1    ondansetron (ZOFRAN-ODT) 4 MG TbDL Take 2 tablets (8 mg total) by mouth 2 (two) times daily. 30 tablet 0    rosuvastatin (CRESTOR) 20 MG tablet Take 1 tablet (20 mg total) by mouth once daily. 90 tablet 3    sacubitriL-valsartan (ENTRESTO) 24-26 mg per tablet Take 1 tablet by mouth 2 (two) times daily. (Patient not taking: Reported on 4/22/2024) 180 tablet 0    [DISCONTINUED] albuterol (PROVENTIL/VENTOLIN HFA) 90 mcg/actuation inhaler Inhale 2 puffs into the  lungs every 4 (four) hours as needed. (Patient not taking: Reported on 4/22/2024)      [DISCONTINUED] buPROPion (WELLBUTRIN SR) 150 MG TBSR 12 hr tablet Take 1 tablet (150 mg total) by mouth 2 (two) times daily. First week take one pill per day. The second week and on, take 1 pill two times per day. 52 tablet 0    [DISCONTINUED] fluticasone propionate (FLONASE) 50 mcg/actuation nasal spray 1 spray by Each Nostril route every morning. (Patient not taking: Reported on 4/22/2024)      [DISCONTINUED] nicotine (NICODERM CQ) 21 mg/24 hr Place 1 patch onto the skin once daily. Fill Medicaid 28 patch 0    [DISCONTINUED] omeprazole (PRILOSEC) 20 MG capsule Take 20 mg by mouth once daily. (Patient not taking: Reported on 4/22/2024)       No facility-administered encounter medications on file as of 4/22/2024.     Medication List with Changes/Refills   Current Medications    ASPIRIN (ECOTRIN) 81 MG EC TABLET    Take 1 tablet (81 mg total) by mouth once daily.    BUPROPION (WELLBUTRIN SR) 150 MG TBSR 12 HR TABLET    Take 1 tablet (150 mg total) by mouth 2 (two) times daily.    CETIRIZINE (ZYRTEC) 10 MG TABLET    Take 1 tablet by mouth every morning.    FUROSEMIDE (LASIX) 20 MG TABLET    Take 1 tablet (20 mg total) by mouth 2 (two) times daily.    NICOTINE (NICODERM CQ) 21 MG/24 HR    Place 1 patch onto the skin once daily. Dispense Clear patches please. Fill Medicaid    NICOTINE POLACRILEX 2 MG LOZG    Take 1 lozenge (2 mg total) by mouth as needed (as needed). Dispense MINIs please. DO NOT CHEW UP. Can take 1-2 per hour in place of a cigarette for breakthrough cravings in place of a cigarette.    NITROGLYCERIN (NITROSTAT) 0.4 MG SL TABLET    Place 1 tablet (0.4 mg total) under the tongue every 5 (five) minutes as needed for Chest pain.    ONDANSETRON (ZOFRAN-ODT) 4 MG TBDL    Take 2 tablets (8 mg total) by mouth 2 (two) times daily.    ROSUVASTATIN (CRESTOR) 20 MG TABLET    Take 1 tablet (20 mg total) by mouth once daily.     SACUBITRIL-VALSARTAN (ENTRESTO) 24-26 MG PER TABLET    Take 1 tablet by mouth 2 (two) times daily.   Discontinued Medications    ALBUTEROL (PROVENTIL/VENTOLIN HFA) 90 MCG/ACTUATION INHALER    Inhale 2 puffs into the lungs every 4 (four) hours as needed.    FLUTICASONE PROPIONATE (FLONASE) 50 MCG/ACTUATION NASAL SPRAY    1 spray by Each Nostril route every morning.    OMEPRAZOLE (PRILOSEC) 20 MG CAPSULE    Take 20 mg by mouth once daily.        This note is at least partially dictated using the M*Modal Fluency Direct word recognition program. There are word recognition mistakes that are occasionally missed on review.   Pre-visit chart review: 12 min    Face to face time: 30 min, > 50% of which spent in education    I have reviewed the actual images of all relevant ECG, rhythm, holter and long term monitoring tracings available in EPIC, MUSE  and in legacy as well as outside records.   I have reviewed the actual images of all relevant CXRays.   I have directly evaluated all relevant data pertaining to any CIED.     Post visit chart documentation and care coordination: 15 min    In total 5 active and complex problems addressed

## 2024-04-23 ENCOUNTER — HOSPITAL ENCOUNTER (OUTPATIENT)
Dept: CARDIOLOGY | Facility: HOSPITAL | Age: 49
Discharge: HOME | End: 2024-04-23
Attending: INTERNAL MEDICINE
Payer: MEDICAID

## 2024-04-23 DIAGNOSIS — I50.42 CHRONIC COMBINED SYSTOLIC AND DIASTOLIC HEART FAILURE: ICD-10-CM

## 2024-04-23 LAB
OHS QRS DURATION: 182 MS
OHS QTC CALCULATION: 556 MS

## 2024-04-23 PROCEDURE — 93227 XTRNL ECG REC<48 HR R&I: CPT | Mod: ,,, | Performed by: INTERNAL MEDICINE

## 2024-04-23 PROCEDURE — 93225 XTRNL ECG REC<48 HRS REC: CPT

## 2024-04-24 ENCOUNTER — PATIENT MESSAGE (OUTPATIENT)
Dept: CARDIOLOGY | Facility: CLINIC | Age: 49
End: 2024-04-24
Payer: MEDICAID

## 2024-04-24 ENCOUNTER — TELEPHONE (OUTPATIENT)
Dept: CARDIOLOGY | Facility: CLINIC | Age: 49
End: 2024-04-24
Payer: MEDICAID

## 2024-04-25 ENCOUNTER — CLINICAL SUPPORT (OUTPATIENT)
Dept: SMOKING CESSATION | Facility: CLINIC | Age: 49
End: 2024-04-25

## 2024-04-25 ENCOUNTER — TELEPHONE (OUTPATIENT)
Dept: SMOKING CESSATION | Facility: CLINIC | Age: 49
End: 2024-04-25
Payer: MEDICAID

## 2024-04-25 DIAGNOSIS — F17.200 NICOTINE DEPENDENCE: Primary | ICD-10-CM

## 2024-04-25 PROCEDURE — 99999 PR PBB SHADOW E&M-EST. PATIENT-LVL II: CPT | Mod: PBBFAC,,,

## 2024-04-25 RX ORDER — DM/P-EPHED/ACETAMINOPH/DOXYLAM 30-7.5/3
2 LIQUID (ML) ORAL
Qty: 81 LOZENGE | Refills: 0 | Status: SHIPPED | OUTPATIENT
Start: 2024-04-25

## 2024-04-25 NOTE — PROGRESS NOTES
Individual Follow-Up Form    4/25/2024    Quit Date: 4/11/24    Clinical Status of Patient: Outpatient    Length of Service: 30 minutes    Continuing Medication: yes  Wellbutrin, Patches, or Nicotine Lozenges    Other Medications: none     Target Symptoms: Withdrawal and medication side effects. The following were  rated moderate (3) to severe (4) on TCRS:  Moderate (3): none  Severe (4): none    Comments: Spoke with patient at length for smoking cessation follow up. Patient remains quit as of 4/11/24. Commended patient for this continued quit. The patient remains on the prescribed tobacco cessation medication regimen of 150 mg Wellbutrin bid, 21 patches and 2 mg lozenges without any negative side effects at this time. Session Focus: Completion of TCRS (Tobacco Cessation Rating Scale) reviewed strategies, habitual behavior, high risks situations, understanding urges and cravings, stress and relaxation with open discussion and additional interventions, Introduced lapses, relapses, understanding them and analyzing the situation of a lapse, conflict issues that may be linked to a lapse. GOALS: 1. Control snacking. 2. Walking. She is having a surgery for a defibrillator in one week.  The patient denies any abnormal behavioral or mental changes at this time. The patient will continue with  therapy sessions and medication monitoring by CTTS. Prescribed medication management will be by physician.    Diagnosis: F17.210    Next Visit: 2 weeks

## 2024-04-25 NOTE — Clinical Note
Spoke with patient at length for smoking cessation follow up. Patient remains quit as of 4/11/24. Commended patient for this continued quit. The patient remains on the prescribed tobacco cessation medication regimen of 150 mg Wellbutrin bid, 21 patches and 2 mg lozenges without any negative side effects at this time. Session Focus: Completion of TCRS (Tobacco Cessation Rating Scale) reviewed strategies, habitual behavior, high risks situations, understanding urges and cravings, stress and relaxation with open discussion and additional interventions, Introduced lapses, relapses, understanding them and analyzing the situation of a lapse, conflict issues that may be linked to a lapse. GOALS: 1. Control snacking. 2. Walking. She is having a surgery for a defibrillator in one week.  The patient denies any abnormal behavioral or mental changes at this time. The patient will continue with  therapy sessions and medication monitoring by CTTS. Prescribed medication management will be by physician.

## 2024-04-25 NOTE — TELEPHONE ENCOUNTER
Attempted to call patient to reschedule their smoking cessation appointment. Left a message with my contact information to reschedule the appointment. Poppy Stevens [phone].

## 2024-04-29 ENCOUNTER — PATIENT MESSAGE (OUTPATIENT)
Dept: CARDIOLOGY | Facility: CLINIC | Age: 49
End: 2024-04-29
Payer: MEDICAID

## 2024-04-29 NOTE — TELEPHONE ENCOUNTER
Telephoned patient to advise Insurance plan [employer] has denied CRT-D/3 lead Defibrillator  Representative requested that patient call and request appeal with Provider

## 2024-04-29 NOTE — TELEPHONE ENCOUNTER
Patient called back and stated her Insurance requested Expedited Appeal and to fax all records to [phone]

## 2024-04-30 ENCOUNTER — TELEPHONE (OUTPATIENT)
Dept: CARDIOLOGY | Facility: CLINIC | Age: 49
End: 2024-04-30
Payer: MEDICAID

## 2024-04-30 ENCOUNTER — PATIENT MESSAGE (OUTPATIENT)
Dept: CARDIOLOGY | Facility: CLINIC | Age: 49
End: 2024-04-30
Payer: MEDICAID

## 2024-04-30 NOTE — TELEPHONE ENCOUNTER
Returned call to patient and ICD implant is still pending with possible decision later today or by 5/1/24  [phone] spoke with rep [employer]    ----- Message from Anna Marie Kerr sent at 4/30/2024 10:18 AM CDT -----  Contact: Patient, [phone]  Calling to speak with the nurse regarding the procedure. Please call her. Thanks.

## 2024-05-01 DIAGNOSIS — R94.30 EJECTION FRACTION < 50%: ICD-10-CM

## 2024-05-01 DIAGNOSIS — I44.7 LBBB (LEFT BUNDLE BRANCH BLOCK): Primary | ICD-10-CM

## 2024-05-01 DIAGNOSIS — I50.42 CHRONIC COMBINED SYSTOLIC AND DIASTOLIC HEART FAILURE: ICD-10-CM

## 2024-05-01 NOTE — TELEPHONE ENCOUNTER
Patient contacted her Insurance company with no resolve.  Telephoned will reschedule and resubmit for 5/9  Advised she should be wearing Lifevest and if she becomes more congested, experiencing severe heart failure symptoms to report to ER but hopefully with resubmission we should be able to proceed

## 2024-05-01 NOTE — TELEPHONE ENCOUNTER
Re-contacted [facility] this morning for status of Appeal and was told case # [id number] is closed and denied - missing distance of walking, copy of heart cath and echocardiogram and all medication therapy to this point---- Advised reports were supplied in MD's encounter note and separately, given alternative to resubmit with all requested data or contact Insurance plan directly at [phone] with ref# [id number].  Contacted [facility] supplied all information and request for review of appeal/Advised would receive a call back in 24 hours with Peer to peer recommendations and was given ref # [id number])  Patient advised case is still pending but will possibly be delayed from tomorrow's date

## 2024-05-02 ENCOUNTER — TELEPHONE (OUTPATIENT)
Dept: CARDIOLOGY | Facility: CLINIC | Age: 49
End: 2024-05-02
Payer: MEDICAID

## 2024-05-02 DIAGNOSIS — I50.42 CHRONIC COMBINED SYSTOLIC AND DIASTOLIC HEART FAILURE: Primary | ICD-10-CM

## 2024-05-02 NOTE — TELEPHONE ENCOUNTER
Spoke to patient. Informed patient to bring in all medication for review. Patient verbalized understanding. Confirmed appt for tomorrow at 9. kA

## 2024-05-03 ENCOUNTER — OFFICE VISIT (OUTPATIENT)
Dept: CARDIOLOGY | Facility: CLINIC | Age: 49
End: 2024-05-03
Payer: MEDICAID

## 2024-05-03 ENCOUNTER — HOSPITAL ENCOUNTER (OUTPATIENT)
Dept: CARDIOLOGY | Facility: HOSPITAL | Age: 49
Discharge: HOME | End: 2024-05-03
Payer: MEDICAID

## 2024-05-03 VITALS
BODY MASS INDEX: 23.51 KG/M2 | DIASTOLIC BLOOD PRESSURE: 70 MMHG | HEART RATE: 88 BPM | WEIGHT: 150.13 LBS | SYSTOLIC BLOOD PRESSURE: 108 MMHG | OXYGEN SATURATION: 95 %

## 2024-05-03 DIAGNOSIS — I44.7 LBBB (LEFT BUNDLE BRANCH BLOCK): ICD-10-CM

## 2024-05-03 DIAGNOSIS — R00.2 PALPITATIONS: ICD-10-CM

## 2024-05-03 DIAGNOSIS — I35.0 NONRHEUMATIC AORTIC VALVE STENOSIS: ICD-10-CM

## 2024-05-03 DIAGNOSIS — I50.20 HEART FAILURE WITH REDUCED EJECTION FRACTION, NYHA CLASS III: Primary | ICD-10-CM

## 2024-05-03 DIAGNOSIS — Z72.0 TOBACCO USE: ICD-10-CM

## 2024-05-03 DIAGNOSIS — I50.42 CHRONIC COMBINED SYSTOLIC AND DIASTOLIC HEART FAILURE: ICD-10-CM

## 2024-05-03 PROCEDURE — 3078F DIAST BP <80 MM HG: CPT | Mod: CPTII,,, | Performed by: NURSE PRACTITIONER

## 2024-05-03 PROCEDURE — 3074F SYST BP LT 130 MM HG: CPT | Mod: CPTII,,, | Performed by: NURSE PRACTITIONER

## 2024-05-03 PROCEDURE — 3008F BODY MASS INDEX DOCD: CPT | Mod: CPTII,,, | Performed by: NURSE PRACTITIONER

## 2024-05-03 PROCEDURE — 99215 OFFICE O/P EST HI 40 MIN: CPT | Mod: S$PBB,,, | Performed by: NURSE PRACTITIONER

## 2024-05-03 PROCEDURE — 99213 OFFICE O/P EST LOW 20 MIN: CPT | Mod: PBBFAC | Performed by: NURSE PRACTITIONER

## 2024-05-03 PROCEDURE — 4010F ACE/ARB THERAPY RXD/TAKEN: CPT | Mod: CPTII,,, | Performed by: NURSE PRACTITIONER

## 2024-05-03 PROCEDURE — 99999 PR PBB SHADOW E&M-EST. PATIENT-LVL III: CPT | Mod: PBBFAC,,, | Performed by: NURSE PRACTITIONER

## 2024-05-03 PROCEDURE — 3044F HG A1C LEVEL LT 7.0%: CPT | Mod: CPTII,,, | Performed by: NURSE PRACTITIONER

## 2024-05-03 PROCEDURE — 1159F MED LIST DOCD IN RCRD: CPT | Mod: CPTII,,, | Performed by: NURSE PRACTITIONER

## 2024-05-03 RX ORDER — NEBULIZER AND COMPRESSOR
1 EACH MISCELLANEOUS ONCE
Qty: 1 EACH | Refills: 0 | Status: SHIPPED | OUTPATIENT
Start: 2024-05-03 | End: 2024-05-03

## 2024-05-03 RX ORDER — METOPROLOL SUCCINATE 25 MG/1
12.5 TABLET, EXTENDED RELEASE ORAL 2 TIMES DAILY
COMMUNITY

## 2024-05-03 NOTE — PROGRESS NOTES
Subjective:   Patient ID:  Jaqueline Gutierrez is a 49 y.o. female who presents for evaluation of No chief complaint on file.      HPI    Jaqueline Gutierrez is a 49 year old female who presents to [facility] clinic for CHF follow up/discuss CRT implant.    Her current medical conditions include CHF, HFrEF of 20%, LifeVest, Thyroid disease, LBBB, Aortic Stenosis, MR, TR, CAD.     She has had numerous medication failures due to hypotension and dizziness/LH.     She is only taking Toprol XL 12.5 mg BID and Lasix 20 mg BID.     Unable to tolerate Entresto due to hypotension and near syncopal events.     She does endorse shortness of breath with any exertional activities. She does have bendopnea as well today.     Today, she is NYHA Stage C FC III.     Denies chest pain or anginal equivalents. Denies orthopnea, PND or abdominal bloating. Reports regular walking without any issues lately. NO leg swelling or claudications. No recent falls, syncope or near syncopal events. Reports compliance with medications and dietary restrictions. NO CNS complaints to suggest TIA or CVA today. No signs of abnormal bleeding on ASA daily.     Ambulated in hallway- developed shortness of breath and increased work of breathing after 150 feet.         Past Medical History:   Diagnosis Date    Elevated troponin 2024    Substance abuse     Thyroid disease        Past Surgical History:   Procedure Laterality Date    CATHETERIZATION OF BOTH LEFT AND RIGHT HEART N/A 2024    Procedure: CATHETERIZATION, HEART, BOTH LEFT AND RIGHT;  Surgeon: Henry Menard MD;  Location: [facility] CATH LAB;  Service: Cardiology;  Laterality: N/A;     SECTION      HYSTERECTOMY      KNEE SURGERY         Social History     Tobacco Use    Smoking status: Every Day     Current packs/day: 0.50     Average packs/day: 0.5 packs/day for 39.3 years (19.7 ttl pk-yrs)     Types: Cigarettes     Start date:      Passive exposure: Current    Smokeless  tobacco: Never   Substance Use Topics    Alcohol use: Yes     Comment: occasional    Drug use: Yes     Types: Marijuana, Methamphetamines       No family history on file.    Wt Readings from Last 3 Encounters:   05/03/24 68.1 kg (150 lb 2.1 oz)   04/22/24 68.3 kg (150 lb 9.2 oz)   04/10/24 67.6 kg (149 lb)     Temp Readings from Last 3 Encounters:   01/23/24 98.3 °F (36.8 °C)   12/30/23 98 °F (36.7 °C) (Oral)   07/17/21 97.5 °F (36.4 °C) (Oral)     BP Readings from Last 3 Encounters:   05/03/24 108/70   04/22/24 94/60   04/10/24 100/70     Pulse Readings from Last 3 Encounters:   05/03/24 88   04/22/24 68   04/10/24 80       Current Outpatient Medications on File Prior to Visit   Medication Sig Dispense Refill    aspirin (ECOTRIN) 81 MG EC tablet Take 1 tablet (81 mg total) by mouth once daily. 90 tablet 3    buPROPion (WELLBUTRIN SR) 150 MG TBSR 12 hr tablet Take 1 tablet (150 mg total) by mouth 2 (two) times daily. 52 tablet 0    furosemide (LASIX) 20 MG tablet Take 1 tablet (20 mg total) by mouth 2 (two) times daily. 60 tablet 11    metoprolol succinate (TOPROL-XL) 25 MG 24 hr tablet Take 12.5 mg by mouth 2 (two) times daily.      nicotine (NICODERM CQ) 21 mg/24 hr Place 1 patch onto the skin once daily. Dispense Clear patches please. Fill Medicaid 28 patch 0    nicotine polacrilex 2 MG Lozg Take 1 lozenge (2 mg total) by mouth as needed (as needed). Dispense MINIs please. DO NOT CHEW UP. Can take 1-2 per hour in place of a cigarette for breakthrough cravings in place of a cigarette. 81 lozenge 0    nitroGLYCERIN (NITROSTAT) 0.4 MG SL tablet Place 1 tablet (0.4 mg total) under the tongue every 5 (five) minutes as needed for Chest pain. 25 tablet 1    ondansetron (ZOFRAN-ODT) 4 MG TbDL Take 2 tablets (8 mg total) by mouth 2 (two) times daily. 30 tablet 0    rosuvastatin (CRESTOR) 20 MG tablet Take 1 tablet (20 mg total) by mouth once daily. 90 tablet 3    cetirizine (ZYRTEC) 10 MG tablet Take 1 tablet by mouth  every morning. (Patient not taking: Reported on 5/3/2024)       No current facility-administered medications on file prior to visit.       No cardiac monitor results found for the past 12 months    Results for orders placed during the hospital encounter of 04/10/24    Echo    Interpretation Summary    Left Ventricle: The left ventricle is severely dilated. Normal wall thickness. There is eccentric hypertrophy. Regional wall motion abnormalities present. Septal motion is consistent with bundle branch block. There is severely reduced systolic function with a visually estimated ejection fraction of 15 - 20%. Ejection fraction by visual approximation is 20%.    Right Ventricle: Normal right ventricular cavity size. Wall thickness is normal. Right ventricle wall motion  is normal. Systolic function is normal.    Left Atrium: Left atrium is severely dilated.    Aortic Valve: The aortic valve is a trileaflet valve. There is moderate stenosis. Aortic valve area by VTI is 1.37 cm². Aortic valve peak velocity is 1.65 m/s. Mean gradient is 6 mmHg. The dimensionless index is 0.45.    Mitral Valve: There is mild to moderate regurgitation.    Tricuspid Valve: There is mild regurgitation.  No pulmonary hypertension.    IVC/SVC: Normal venous pressure at 3 mmHg.    No results found for this or any previous visit.        Results for orders placed or performed during the hospital encounter of 04/22/24   SCHEDULED EKG 12-LEAD (to Muse)    Collection Time: 04/22/24  4:00 PM   Result Value Ref Range    QRS Duration 182 ms    [facility] QTC Calculation 556 ms    Narrative    Test Reason : I50.42,    Vent. Rate : 083 BPM     Atrial Rate : 083 BPM     P-R Int : 148 ms          QRS Dur : 182 ms      QT Int : 474 ms       P-R-T Axes : 067 -42 092 degrees     QTc Int : 556 ms    Normal sinus rhythm  Left atrial enlargement  Left axis deviation  Left bundle branch block  Abnormal ECG  When compared with ECG of 20-JAN-2024 16:02,  The axis Shifted  left  T wave inversion no longer evident in Inferior leads  T wave inversion more evident in Lateral leads  Confirmed by KAMALA CAPPS MD ([id number]) on 4/23/2024 7:54:02 AM    Referred By: KAYE CALHOUN           Confirmed By:KAMALA CAPPS MD         Review of Systems   Constitutional: Positive for malaise/fatigue.   HENT:  Negative for hearing loss and hoarse voice.    Eyes:  Negative for blurred vision and visual disturbance.   Cardiovascular:  Positive for dyspnea on exertion and palpitations. Negative for chest pain, claudication, irregular heartbeat, leg swelling, near-syncope, orthopnea, paroxysmal nocturnal dyspnea and syncope.   Respiratory:  Negative for cough, hemoptysis, shortness of breath, sleep disturbances due to breathing, snoring and wheezing.    Endocrine: Negative for cold intolerance and heat intolerance.   Hematologic/Lymphatic: Does not bruise/bleed easily.   Skin:  Negative for color change, dry skin and nail changes.   Musculoskeletal:  Positive for arthritis and back pain. Negative for joint pain and myalgias.   Gastrointestinal:  Positive for bloating. Negative for abdominal pain, constipation, nausea and vomiting.   Genitourinary:  Negative for dysuria, flank pain, hematuria and hesitancy.   Neurological:  Negative for headaches, light-headedness, loss of balance, numbness, paresthesias and weakness.   Psychiatric/Behavioral:  Negative for altered mental status.    Allergic/Immunologic: Negative for environmental allergies.         Objective:/70 (BP Location: Left arm, Patient Position: Sitting)   Pulse 88   Wt 68.1 kg (150 lb 2.1 oz)   SpO2 95%   BMI 23.51 kg/m²      Physical Exam  Vitals and nursing note reviewed.   Constitutional:       General: She is not in acute distress.     Appearance: Normal appearance. She is well-developed. She is not ill-appearing.   HENT:      Head: Normocephalic and atraumatic.      Nose: Nose normal.      Mouth/Throat:      Mouth: Mucous  membranes are moist.   Eyes:      Pupils: Pupils are equal, round, and reactive to light.   Neck:      Thyroid: No thyromegaly.      Vascular: No JVD.      Trachea: No tracheal deviation.   Cardiovascular:      Rate and Rhythm: Normal rate and regular rhythm.      Chest Wall: PMI is not displaced.      Pulses: Intact distal pulses.           Radial pulses are 2+ on the right side and 2+ on the left side.        Dorsalis pedis pulses are 2+ on the right side and 2+ on the left side.      Heart sounds: S1 normal and S2 normal. Heart sounds not distant. No murmur heard.  Pulmonary:      Effort: Pulmonary effort is normal. No respiratory distress.      Breath sounds: Normal breath sounds. No wheezing.   Abdominal:      General: Bowel sounds are normal. There is distension.      Palpations: Abdomen is soft.      Tenderness: There is no abdominal tenderness.   Musculoskeletal:         General: No swelling. Normal range of motion.      Cervical back: Full passive range of motion without pain, normal range of motion and neck supple.      Right lower leg: No edema.      Left lower leg: No edema.      Right ankle: No swelling.      Left ankle: No swelling.   Skin:     General: Skin is warm and dry.      Capillary Refill: Capillary refill takes less than 2 seconds.      Nails: There is no clubbing.   Neurological:      General: No focal deficit present.      Mental Status: She is alert and oriented to person, place, and time.      Motor: No weakness.   Psychiatric:         Speech: Speech normal.         Behavior: Behavior normal.         Thought Content: Thought content normal.         Judgment: Judgment normal.         Lab Results   Component Value Date    CHOL 215 (H) 01/22/2024     Lab Results   Component Value Date    HDL 39 (L) 01/22/2024     Lab Results   Component Value Date    LDLCALC 151.4 01/22/2024     Lab Results   Component Value Date    TRIG 123 01/22/2024     Lab Results   Component Value Date    CHOLHDL 18.1  (L) 01/22/2024       Chemistry        Component Value Date/Time     04/23/2024 1053    K 3.5 04/23/2024 1053     04/23/2024 1053    CO2 27 04/23/2024 1053    BUN 18 04/23/2024 1053    CREATININE 1.5 (H) 04/23/2024 1053    GLU 76 04/23/2024 1053        Component Value Date/Time    CALCIUM 10.0 04/23/2024 1053    ALKPHOS 79 04/23/2024 1053    AST 28 04/23/2024 1053    ALT 27 04/23/2024 1053    BILITOT 1.0 04/23/2024 1053    ESTGFRAFRICA >60 07/17/2021 0639    EGFRNONAA >60 07/17/2021 0639          Lab Results   Component Value Date    TSH 0.904 01/21/2024     Lab Results   Component Value Date    INR 1.0 04/23/2024    INR 1.0 12/30/2023    INR 1.0 05/31/2010     Lab Results   Component Value Date    WBC 4.97 04/23/2024    HGB 14.7 04/23/2024    HCT 43.3 04/23/2024    MCV 94 04/23/2024     04/23/2024          Assessment:      1. Heart failure with reduced ejection fraction, NYHA class III    2. LBBB (left bundle branch block)    3. Nonrheumatic aortic valve stenosis    4. Tobacco use        Plan:     Wearing LifeVest for SCD prevention  Continue BB, Lasix  Unable to tolerate ARNI or SGLT2 or MRAS due to hypotension and near syncopal events.    Dash diet 2 gm sodium restriction  Daily weights    She does have smartphone for connectivity with CRT D    RTC post op    Nicole May, FNP-C [facility]

## 2024-05-03 NOTE — H&P (VIEW-ONLY)
Subjective:   Patient ID:  Jaqueline Gutierrez is a 49 y.o. female who presents for evaluation of No chief complaint on file.      HPI    Jaqueline Gutierrez is a 49 year old female who presents to Arrhythmia clinic for CHF follow up/discuss CRT implant.    Her current medical conditions include CHF, HFrEF of 20%, LifeVest, Thyroid disease, LBBB, Aortic Stenosis, MR, TR, CAD.     She has had numerous medication failures due to hypotension and dizziness/LH.     She is only taking Toprol XL 12.5 mg BID and Lasix 20 mg BID.     Unable to tolerate Entresto due to hypotension and near syncopal events.     She does endorse shortness of breath with any exertional activities. She does have bendopnea as well today.     Today, she is NYHA Stage C FC III.     Denies chest pain or anginal equivalents. Denies orthopnea, PND or abdominal bloating. Reports regular walking without any issues lately. NO leg swelling or claudications. No recent falls, syncope or near syncopal events. Reports compliance with medications and dietary restrictions. NO CNS complaints to suggest TIA or CVA today. No signs of abnormal bleeding on ASA daily.     Ambulated in hallway- developed shortness of breath and increased work of breathing after 150 feet.         Past Medical History:   Diagnosis Date    Elevated troponin 2024    Substance abuse     Thyroid disease        Past Surgical History:   Procedure Laterality Date    CATHETERIZATION OF BOTH LEFT AND RIGHT HEART N/A 2024    Procedure: CATHETERIZATION, HEART, BOTH LEFT AND RIGHT;  Surgeon: Henry Menard MD;  Location: [facility] CATH LAB;  Service: Cardiology;  Laterality: N/A;     SECTION      HYSTERECTOMY      KNEE SURGERY         Social History     Tobacco Use    Smoking status: Every Day     Current packs/day: 0.50     Average packs/day: 0.5 packs/day for 39.3 years (19.7 ttl pk-yrs)     Types: Cigarettes     Start date:      Passive exposure: Current    Smokeless  tobacco: Never   Substance Use Topics    Alcohol use: Yes     Comment: occasional    Drug use: Yes     Types: Marijuana, Methamphetamines       No family history on file.    Wt Readings from Last 3 Encounters:   05/03/24 68.1 kg (150 lb 2.1 oz)   04/22/24 68.3 kg (150 lb 9.2 oz)   04/10/24 67.6 kg (149 lb)     Temp Readings from Last 3 Encounters:   01/23/24 98.3 °F (36.8 °C)   12/30/23 98 °F (36.7 °C) (Oral)   07/17/21 97.5 °F (36.4 °C) (Oral)     BP Readings from Last 3 Encounters:   05/03/24 108/70   04/22/24 94/60   04/10/24 100/70     Pulse Readings from Last 3 Encounters:   05/03/24 88   04/22/24 68   04/10/24 80       Current Outpatient Medications on File Prior to Visit   Medication Sig Dispense Refill    aspirin (ECOTRIN) 81 MG EC tablet Take 1 tablet (81 mg total) by mouth once daily. 90 tablet 3    buPROPion (WELLBUTRIN SR) 150 MG TBSR 12 hr tablet Take 1 tablet (150 mg total) by mouth 2 (two) times daily. 52 tablet 0    furosemide (LASIX) 20 MG tablet Take 1 tablet (20 mg total) by mouth 2 (two) times daily. 60 tablet 11    metoprolol succinate (TOPROL-XL) 25 MG 24 hr tablet Take 12.5 mg by mouth 2 (two) times daily.      nicotine (NICODERM CQ) 21 mg/24 hr Place 1 patch onto the skin once daily. Dispense Clear patches please. Fill Medicaid 28 patch 0    nicotine polacrilex 2 MG Lozg Take 1 lozenge (2 mg total) by mouth as needed (as needed). Dispense MINIs please. DO NOT CHEW UP. Can take 1-2 per hour in place of a cigarette for breakthrough cravings in place of a cigarette. 81 lozenge 0    nitroGLYCERIN (NITROSTAT) 0.4 MG SL tablet Place 1 tablet (0.4 mg total) under the tongue every 5 (five) minutes as needed for Chest pain. 25 tablet 1    ondansetron (ZOFRAN-ODT) 4 MG TbDL Take 2 tablets (8 mg total) by mouth 2 (two) times daily. 30 tablet 0    rosuvastatin (CRESTOR) 20 MG tablet Take 1 tablet (20 mg total) by mouth once daily. 90 tablet 3    cetirizine (ZYRTEC) 10 MG tablet Take 1 tablet by mouth  every morning. (Patient not taking: Reported on 5/3/2024)       No current facility-administered medications on file prior to visit.       No cardiac monitor results found for the past 12 months    Results for orders placed during the hospital encounter of 04/10/24    Echo    Interpretation Summary    Left Ventricle: The left ventricle is severely dilated. Normal wall thickness. There is eccentric hypertrophy. Regional wall motion abnormalities present. Septal motion is consistent with bundle branch block. There is severely reduced systolic function with a visually estimated ejection fraction of 15 - 20%. Ejection fraction by visual approximation is 20%.    Right Ventricle: Normal right ventricular cavity size. Wall thickness is normal. Right ventricle wall motion  is normal. Systolic function is normal.    Left Atrium: Left atrium is severely dilated.    Aortic Valve: The aortic valve is a trileaflet valve. There is moderate stenosis. Aortic valve area by VTI is 1.37 cm². Aortic valve peak velocity is 1.65 m/s. Mean gradient is 6 mmHg. The dimensionless index is 0.45.    Mitral Valve: There is mild to moderate regurgitation.    Tricuspid Valve: There is mild regurgitation.  No pulmonary hypertension.    IVC/SVC: Normal venous pressure at 3 mmHg.    No results found for this or any previous visit.        Results for orders placed or performed during the hospital encounter of 04/22/24   SCHEDULED EKG 12-LEAD (to Muse)    Collection Time: 04/22/24  4:00 PM   Result Value Ref Range    QRS Duration 182 ms    [facility] QTC Calculation 556 ms    Narrative    Test Reason : I50.42,    Vent. Rate : 083 BPM     Atrial Rate : 083 BPM     P-R Int : 148 ms          QRS Dur : 182 ms      QT Int : 474 ms       P-R-T Axes : 067 -42 092 degrees     QTc Int : 556 ms    Normal sinus rhythm  Left atrial enlargement  Left axis deviation  Left bundle branch block  Abnormal ECG  When compared with ECG of 20-JAN-2024 16:02,  The axis Shifted  left  T wave inversion no longer evident in Inferior leads  T wave inversion more evident in Lateral leads  Confirmed by KAMALA CAPPS MD ([id number]) on 4/23/2024 7:54:02 AM    Referred By: KAYE CALHOUN           Confirmed By:KAMALA CAPPS MD         Review of Systems   Constitutional: Positive for malaise/fatigue.   HENT:  Negative for hearing loss and hoarse voice.    Eyes:  Negative for blurred vision and visual disturbance.   Cardiovascular:  Positive for dyspnea on exertion and palpitations. Negative for chest pain, claudication, irregular heartbeat, leg swelling, near-syncope, orthopnea, paroxysmal nocturnal dyspnea and syncope.   Respiratory:  Negative for cough, hemoptysis, shortness of breath, sleep disturbances due to breathing, snoring and wheezing.    Endocrine: Negative for cold intolerance and heat intolerance.   Hematologic/Lymphatic: Does not bruise/bleed easily.   Skin:  Negative for color change, dry skin and nail changes.   Musculoskeletal:  Positive for arthritis and back pain. Negative for joint pain and myalgias.   Gastrointestinal:  Positive for bloating. Negative for abdominal pain, constipation, nausea and vomiting.   Genitourinary:  Negative for dysuria, flank pain, hematuria and hesitancy.   Neurological:  Negative for headaches, light-headedness, loss of balance, numbness, paresthesias and weakness.   Psychiatric/Behavioral:  Negative for altered mental status.    Allergic/Immunologic: Negative for environmental allergies.         Objective:/70 (BP Location: Left arm, Patient Position: Sitting)   Pulse 88   Wt 68.1 kg (150 lb 2.1 oz)   SpO2 95%   BMI 23.51 kg/m²      Physical Exam  Vitals and nursing note reviewed.   Constitutional:       General: She is not in acute distress.     Appearance: Normal appearance. She is well-developed. She is not ill-appearing.   HENT:      Head: Normocephalic and atraumatic.      Nose: Nose normal.      Mouth/Throat:      Mouth: Mucous  membranes are moist.   Eyes:      Pupils: Pupils are equal, round, and reactive to light.   Neck:      Thyroid: No thyromegaly.      Vascular: No JVD.      Trachea: No tracheal deviation.   Cardiovascular:      Rate and Rhythm: Normal rate and regular rhythm.      Chest Wall: PMI is not displaced.      Pulses: Intact distal pulses.           Radial pulses are 2+ on the right side and 2+ on the left side.        Dorsalis pedis pulses are 2+ on the right side and 2+ on the left side.      Heart sounds: S1 normal and S2 normal. Heart sounds not distant. No murmur heard.  Pulmonary:      Effort: Pulmonary effort is normal. No respiratory distress.      Breath sounds: Normal breath sounds. No wheezing.   Abdominal:      General: Bowel sounds are normal. There is distension.      Palpations: Abdomen is soft.      Tenderness: There is no abdominal tenderness.   Musculoskeletal:         General: No swelling. Normal range of motion.      Cervical back: Full passive range of motion without pain, normal range of motion and neck supple.      Right lower leg: No edema.      Left lower leg: No edema.      Right ankle: No swelling.      Left ankle: No swelling.   Skin:     General: Skin is warm and dry.      Capillary Refill: Capillary refill takes less than 2 seconds.      Nails: There is no clubbing.   Neurological:      General: No focal deficit present.      Mental Status: She is alert and oriented to person, place, and time.      Motor: No weakness.   Psychiatric:         Speech: Speech normal.         Behavior: Behavior normal.         Thought Content: Thought content normal.         Judgment: Judgment normal.         Lab Results   Component Value Date    CHOL 215 (H) 01/22/2024     Lab Results   Component Value Date    HDL 39 (L) 01/22/2024     Lab Results   Component Value Date    LDLCALC 151.4 01/22/2024     Lab Results   Component Value Date    TRIG 123 01/22/2024     Lab Results   Component Value Date    CHOLHDL 18.1  (L) 01/22/2024       Chemistry        Component Value Date/Time     04/23/2024 1053    K 3.5 04/23/2024 1053     04/23/2024 1053    CO2 27 04/23/2024 1053    BUN 18 04/23/2024 1053    CREATININE 1.5 (H) 04/23/2024 1053    GLU 76 04/23/2024 1053        Component Value Date/Time    CALCIUM 10.0 04/23/2024 1053    ALKPHOS 79 04/23/2024 1053    AST 28 04/23/2024 1053    ALT 27 04/23/2024 1053    BILITOT 1.0 04/23/2024 1053    ESTGFRAFRICA >60 07/17/2021 0639    EGFRNONAA >60 07/17/2021 0639          Lab Results   Component Value Date    TSH 0.904 01/21/2024     Lab Results   Component Value Date    INR 1.0 04/23/2024    INR 1.0 12/30/2023    INR 1.0 05/31/2010     Lab Results   Component Value Date    WBC 4.97 04/23/2024    HGB 14.7 04/23/2024    HCT 43.3 04/23/2024    MCV 94 04/23/2024     04/23/2024          Assessment:      1. Heart failure with reduced ejection fraction, NYHA class III    2. LBBB (left bundle branch block)    3. Nonrheumatic aortic valve stenosis    4. Tobacco use        Plan:     Wearing LifeVest for SCD prevention  Continue BB, Lasix  Unable to tolerate ARNI or SGLT2 or MRAS due to hypotension and near syncopal events.    Dash diet 2 gm sodium restriction  Daily weights    She does have smartphone for connectivity with CRT D    RTC post op    Nicole May, FNP-C [facility]

## 2024-05-08 ENCOUNTER — PATIENT MESSAGE (OUTPATIENT)
Dept: CARDIOLOGY | Facility: CLINIC | Age: 49
End: 2024-05-08
Payer: MEDICAID

## 2024-05-08 ENCOUNTER — ANESTHESIA EVENT (OUTPATIENT)
Dept: CARDIOLOGY | Facility: HOSPITAL | Age: 49
End: 2024-05-08
Payer: MEDICAID

## 2024-05-08 DIAGNOSIS — I50.42 CHRONIC COMBINED SYSTOLIC AND DIASTOLIC HEART FAILURE: ICD-10-CM

## 2024-05-08 DIAGNOSIS — Z45.02 ICD (IMPLANTABLE CARDIOVERTER-DEFIBRILLATOR) BATTERY DEPLETION: Primary | ICD-10-CM

## 2024-05-08 DIAGNOSIS — I44.7 LBBB (LEFT BUNDLE BRANCH BLOCK): ICD-10-CM

## 2024-05-08 DIAGNOSIS — I50.20 HEART FAILURE WITH REDUCED EJECTION FRACTION, NYHA CLASS III: ICD-10-CM

## 2024-05-08 LAB
OHS CV EVENT MONITOR DAY: 0
OHS CV HOLTER LENGTH DECIMAL HOURS: 48
OHS CV HOLTER LENGTH HOURS: 48
OHS CV HOLTER LENGTH MINUTES: 0
OHS CV HOLTER SINUS AVERAGE HR: 87
OHS CV HOLTER SINUS MAX HR: 158
OHS CV HOLTER SINUS MIN HR: 63

## 2024-05-09 ENCOUNTER — ANESTHESIA (OUTPATIENT)
Dept: CARDIOLOGY | Facility: HOSPITAL | Age: 49
End: 2024-05-09
Payer: MEDICAID

## 2024-05-09 ENCOUNTER — HOSPITAL ENCOUNTER (OUTPATIENT)
Facility: HOSPITAL | Age: 49
Discharge: HOME | End: 2024-05-09
Attending: INTERNAL MEDICINE | Admitting: INTERNAL MEDICINE
Payer: MEDICAID

## 2024-05-09 DIAGNOSIS — Z95.810 USES LIFEVEST DEFIBRILLATOR: ICD-10-CM

## 2024-05-09 DIAGNOSIS — R94.30 EJECTION FRACTION < 50%: ICD-10-CM

## 2024-05-09 DIAGNOSIS — I44.7 LBBB (LEFT BUNDLE BRANCH BLOCK): ICD-10-CM

## 2024-05-09 DIAGNOSIS — R06.02 SOB (SHORTNESS OF BREATH): ICD-10-CM

## 2024-05-09 DIAGNOSIS — I50.22 CHF (CONGESTIVE HEART FAILURE), NYHA CLASS III, CHRONIC, SYSTOLIC: Primary | ICD-10-CM

## 2024-05-09 DIAGNOSIS — I50.42 CHRONIC COMBINED SYSTOLIC AND DIASTOLIC HEART FAILURE: ICD-10-CM

## 2024-05-09 DIAGNOSIS — I50.43 ACUTE ON CHRONIC COMBINED SYSTOLIC AND DIASTOLIC CONGESTIVE HEART FAILURE: ICD-10-CM

## 2024-05-09 DIAGNOSIS — I44.7 LBBB (LEFT BUNDLE BRANCH BLOCK): Primary | ICD-10-CM

## 2024-05-09 DIAGNOSIS — I50.20 HEART FAILURE WITH REDUCED EJECTION FRACTION, NYHA CLASS III: ICD-10-CM

## 2024-05-09 DIAGNOSIS — I49.9 ARRHYTHMIA: ICD-10-CM

## 2024-05-09 LAB
ANION GAP SERPL CALC-SCNC: 8 MMOL/L (ref 8–16)
BUN SERPL-MCNC: 18 MG/DL (ref 6–20)
CALCIUM SERPL-MCNC: 9.4 MG/DL (ref 8.7–10.5)
CHLORIDE SERPL-SCNC: 106 MMOL/L (ref 95–110)
CO2 SERPL-SCNC: 26 MMOL/L (ref 23–29)
CREAT SERPL-MCNC: 1.4 MG/DL (ref 0.5–1.4)
EST. GFR  (NO RACE VARIABLE): 46 ML/MIN/1.73 M^2
GLUCOSE SERPL-MCNC: 104 MG/DL (ref 70–110)
POTASSIUM SERPL-SCNC: 4.1 MMOL/L (ref 3.5–5.1)
SODIUM SERPL-SCNC: 140 MMOL/L (ref 136–145)

## 2024-05-09 PROCEDURE — 27201423 OPTIME MED/SURG SUP & DEVICES STERILE SUPPLY: Performed by: INTERNAL MEDICINE

## 2024-05-09 PROCEDURE — 63600175 PHARM REV CODE 636 W HCPCS

## 2024-05-09 PROCEDURE — 33225 L VENTRIC PACING LEAD ADD-ON: CPT | Mod: ,,, | Performed by: INTERNAL MEDICINE

## 2024-05-09 PROCEDURE — 25000003 PHARM REV CODE 250

## 2024-05-09 PROCEDURE — C1887 CATHETER, GUIDING: HCPCS | Performed by: INTERNAL MEDICINE

## 2024-05-09 PROCEDURE — 33225 L VENTRIC PACING LEAD ADD-ON: CPT | Performed by: INTERNAL MEDICINE

## 2024-05-09 PROCEDURE — C1730 CATH, EP, 19 OR FEW ELECT: HCPCS | Performed by: INTERNAL MEDICINE

## 2024-05-09 PROCEDURE — 25500020 PHARM REV CODE 255: Performed by: INTERNAL MEDICINE

## 2024-05-09 PROCEDURE — 37000008 HC ANESTHESIA 1ST 15 MINUTES: Performed by: INTERNAL MEDICINE

## 2024-05-09 PROCEDURE — 33249 INSJ/RPLCMT DEFIB W/LEAD(S): CPT | Mod: 51,,, | Performed by: INTERNAL MEDICINE

## 2024-05-09 PROCEDURE — C1721 AICD, DUAL CHAMBER: HCPCS | Performed by: INTERNAL MEDICINE

## 2024-05-09 PROCEDURE — 37000009 HC ANESTHESIA EA ADD 15 MINS: Performed by: INTERNAL MEDICINE

## 2024-05-09 PROCEDURE — 99499 UNLISTED E&M SERVICE: CPT | Mod: ,,, | Performed by: INTERNAL MEDICINE

## 2024-05-09 PROCEDURE — 63600175 PHARM REV CODE 636 W HCPCS: Performed by: INTERNAL MEDICINE

## 2024-05-09 PROCEDURE — 33249 INSJ/RPLCMT DEFIB W/LEAD(S): CPT | Performed by: INTERNAL MEDICINE

## 2024-05-09 PROCEDURE — C1894 INTRO/SHEATH, NON-LASER: HCPCS | Performed by: INTERNAL MEDICINE

## 2024-05-09 PROCEDURE — 25000003 PHARM REV CODE 250: Performed by: INTERNAL MEDICINE

## 2024-05-09 PROCEDURE — C1777 LEAD, AICD, ENDO SINGLE COIL: HCPCS | Performed by: INTERNAL MEDICINE

## 2024-05-09 PROCEDURE — 80048 BASIC METABOLIC PNL TOTAL CA: CPT | Performed by: INTERNAL MEDICINE

## 2024-05-09 PROCEDURE — C1900 LEAD, CORONARY VENOUS: HCPCS | Performed by: INTERNAL MEDICINE

## 2024-05-09 PROCEDURE — C1769 GUIDE WIRE: HCPCS | Performed by: INTERNAL MEDICINE

## 2024-05-09 DEVICE — IMPLANTABLE DEVICE
Type: IMPLANTABLE DEVICE | Site: CHEST | Status: FUNCTIONAL
Brand: ACTICOR 7 HF-T QP

## 2024-05-09 DEVICE — SLIT SUTURE SLEEVE (4.3 - 4.7F)
Type: IMPLANTABLE DEVICE | Site: HEART | Status: FUNCTIONAL
Brand: SJM™

## 2024-05-09 DEVICE — IMPLANTABLE DEVICE
Type: IMPLANTABLE DEVICE | Site: HEART | Status: FUNCTIONAL
Brand: SENTUS

## 2024-05-09 DEVICE — IMPLANTABLE DEVICE
Type: IMPLANTABLE DEVICE | Site: HEART | Status: FUNCTIONAL
Brand: PLEXA PROMRI

## 2024-05-09 RX ORDER — LIDOCAINE HYDROCHLORIDE 20 MG/ML
INJECTION, SOLUTION EPIDURAL; INFILTRATION; INTRACAUDAL; PERINEURAL
Status: DISCONTINUED | OUTPATIENT
Start: 2024-05-09 | End: 2024-05-09 | Stop reason: HOSPADM

## 2024-05-09 RX ORDER — PROPOFOL 10 MG/ML
VIAL (ML) INTRAVENOUS CONTINUOUS PRN
Status: DISCONTINUED | OUTPATIENT
Start: 2024-05-09 | End: 2024-05-09

## 2024-05-09 RX ORDER — DEXMEDETOMIDINE HYDROCHLORIDE 100 UG/ML
INJECTION, SOLUTION INTRAVENOUS
Status: DISCONTINUED | OUTPATIENT
Start: 2024-05-09 | End: 2024-05-09

## 2024-05-09 RX ORDER — MIDAZOLAM HYDROCHLORIDE 1 MG/ML
INJECTION INTRAMUSCULAR; INTRAVENOUS
Status: DISCONTINUED | OUTPATIENT
Start: 2024-05-09 | End: 2024-05-09

## 2024-05-09 RX ORDER — CEFAZOLIN SODIUM 1 G/3ML
INJECTION, POWDER, FOR SOLUTION INTRAMUSCULAR; INTRAVENOUS
Status: DISCONTINUED | OUTPATIENT
Start: 2024-05-09 | End: 2024-05-09

## 2024-05-09 RX ORDER — VANCOMYCIN HYDROCHLORIDE 1 G/20ML
INJECTION, POWDER, LYOPHILIZED, FOR SOLUTION INTRAVENOUS
Status: DISCONTINUED | OUTPATIENT
Start: 2024-05-09 | End: 2024-05-09 | Stop reason: HOSPADM

## 2024-05-09 RX ORDER — FENTANYL CITRATE 50 UG/ML
INJECTION, SOLUTION INTRAMUSCULAR; INTRAVENOUS
Status: DISCONTINUED | OUTPATIENT
Start: 2024-05-09 | End: 2024-05-09

## 2024-05-09 RX ORDER — KETOROLAC TROMETHAMINE 10 MG/1
10 TABLET, FILM COATED ORAL EVERY 6 HOURS
Qty: 20 TABLET | Refills: 0 | Status: SHIPPED | OUTPATIENT
Start: 2024-05-09 | End: 2024-05-14

## 2024-05-09 RX ORDER — ACETAMINOPHEN 325 MG/1
650 TABLET ORAL EVERY 4 HOURS PRN
Status: DISCONTINUED | OUTPATIENT
Start: 2024-05-09 | End: 2024-05-09 | Stop reason: HOSPADM

## 2024-05-09 RX ORDER — MIDAZOLAM HYDROCHLORIDE 1 MG/ML
2 INJECTION, SOLUTION INTRAMUSCULAR; INTRAVENOUS ONCE
Status: DISCONTINUED | OUTPATIENT
Start: 2024-05-09 | End: 2024-05-09 | Stop reason: HOSPADM

## 2024-05-09 RX ORDER — HYDROCODONE BITARTRATE AND ACETAMINOPHEN 5; 325 MG/1; MG/1
1 TABLET ORAL EVERY 4 HOURS PRN
Status: DISCONTINUED | OUTPATIENT
Start: 2024-05-09 | End: 2024-05-09 | Stop reason: HOSPADM

## 2024-05-09 RX ORDER — HYDROCODONE BITARTRATE AND ACETAMINOPHEN 10; 325 MG/1; MG/1
1 TABLET ORAL EVERY 4 HOURS PRN
Status: DISCONTINUED | OUTPATIENT
Start: 2024-05-09 | End: 2024-05-09 | Stop reason: HOSPADM

## 2024-05-09 RX ORDER — KETAMINE HCL IN 0.9 % NACL 50 MG/5 ML
SYRINGE (ML) INTRAVENOUS
Status: DISCONTINUED | OUTPATIENT
Start: 2024-05-09 | End: 2024-05-09

## 2024-05-09 RX ADMIN — DEXMEDETOMIDINE 4 MCG: 200 INJECTION, SOLUTION INTRAVENOUS at 03:05

## 2024-05-09 RX ADMIN — SODIUM CHLORIDE, POTASSIUM CHLORIDE, SODIUM LACTATE AND CALCIUM CHLORIDE: 600; 310; 30; 20 INJECTION, SOLUTION INTRAVENOUS at 03:05

## 2024-05-09 RX ADMIN — FENTANYL CITRATE 50 MCG: 50 INJECTION, SOLUTION INTRAMUSCULAR; INTRAVENOUS at 03:05

## 2024-05-09 RX ADMIN — DEXMEDETOMIDINE 4 MCG: 200 INJECTION, SOLUTION INTRAVENOUS at 05:05

## 2024-05-09 RX ADMIN — Medication 10 MG: at 03:05

## 2024-05-09 RX ADMIN — DEXMEDETOMIDINE 4 MCG: 200 INJECTION, SOLUTION INTRAVENOUS at 04:05

## 2024-05-09 RX ADMIN — MIDAZOLAM HYDROCHLORIDE 2 MG: 1 INJECTION, SOLUTION INTRAMUSCULAR; INTRAVENOUS at 03:05

## 2024-05-09 RX ADMIN — CEFAZOLIN 2 G: 330 INJECTION, POWDER, FOR SOLUTION INTRAMUSCULAR; INTRAVENOUS at 03:05

## 2024-05-09 RX ADMIN — Medication 10 MG: at 04:05

## 2024-05-09 RX ADMIN — PROPOFOL 65 MCG/KG/MIN: 10 INJECTION, EMULSION INTRAVENOUS at 03:05

## 2024-05-09 RX ADMIN — Medication 20 MG: at 03:05

## 2024-05-09 RX ADMIN — HYDROCODONE BITARTRATE AND ACETAMINOPHEN 1 TABLET: 10; 325 TABLET ORAL at 06:05

## 2024-05-09 NOTE — Clinical Note
A venogram was performed in the coronary sinus. A balloon was inserted. The vessel was injected via hand injection  with 5 mL of contrast. The balloon was removed.

## 2024-05-09 NOTE — ANESTHESIA PREPROCEDURE EVALUATION
2024  Jaqueline Gutierrez is a 49 y.o., female.    Patient Active Problem List   Diagnosis    Anxiety reaction    Heart failure with reduced ejection fraction, NYHA class III    Venous embolism and thrombosis of superficial vessels of lower extremity    Substance abuse    Tobacco use    LBBB (left bundle branch block)    Elevated troponin    Nonrheumatic aortic valve stenosis    Palpitations    Uses LifeVest defibrillator    CHF (congestive heart failure), NYHA class III, chronic, systolic     Past Medical History:   Diagnosis Date    Elevated troponin 2024    Substance abuse     Thyroid disease      Past Surgical History:   Procedure Laterality Date    CATHETERIZATION OF BOTH LEFT AND RIGHT HEART N/A 2024    Procedure: CATHETERIZATION, HEART, BOTH LEFT AND RIGHT;  Surgeon: Henry Menard MD;  Location: [facility] CATH LAB;  Service: Cardiology;  Laterality: N/A;     SECTION      HYSTERECTOMY      KNEE SURGERY         Chemistry        Component Value Date/Time     2024 1054    K 4.1 2024 1054     2024 1054    CO2 26 2024 1054    BUN 18 2024 1054    CREATININE 1.4 2024 1054     2024 1054        Component Value Date/Time    CALCIUM 9.4 2024 1054    ALKPHOS 79 2024 1053    AST 28 2024 1053    ALT 27 2024 1053    BILITOT 1.0 2024 1053    ESTGFRAFRICA >60 2021 0639    EGFRNONAA >60 2021 0639        Lab Results   Component Value Date    WBC 4.97 2024    HGB 14.7 2024    HCT 43.3 2024    MCV 94 2024     2024       Pre-op Assessment    I have reviewed the Patient Summary Reports.     I have reviewed the Nursing Notes. I have reviewed the NPO Status.   I have reviewed the Medications.     Review of Systems  Anesthesia Hx:  No problems with previous  Anesthesia   History of prior surgery of interest to airway management or planning:  Previous anesthesia: General, MAC          Denies Personal Hx of Anesthesia complications.                    Social:  Smoker, Alcohol Use, Recreational Drugs Marijuana, Methamphetamines - denies any recent use       Cardiovascular:         Dysrhythmias   CHF              Congestive Heart Failure (CHF)                  Disorder of Cardiac Rhythm     Pulmonary:  Pulmonary Normal                       Neurological:  Neurology Normal                                      Endocrine:        Denies Obesity / BMI > 30  Psych:  Psychiatric History                Results for orders placed or performed during the hospital encounter of 04/22/24   SCHEDULED EKG 12-LEAD (to Muse)    Collection Time: 04/22/24  4:00 PM   Result Value Ref Range    QRS Duration 182 ms    [facility] QTC Calculation 556 ms    Narrative    Test Reason : I50.42,    Vent. Rate : 083 BPM     Atrial Rate : 083 BPM     P-R Int : 148 ms          QRS Dur : 182 ms      QT Int : 474 ms       P-R-T Axes : 067 -42 092 degrees     QTc Int : 556 ms    Normal sinus rhythm  Left atrial enlargement  Left axis deviation  Left bundle branch block  Abnormal ECG  When compared with ECG of 20-JAN-2024 16:02,  The axis Shifted left  T wave inversion no longer evident in Inferior leads  T wave inversion more evident in Lateral leads  Confirmed by KAMALA CAPPS MD ([id number]) on 4/23/2024 7:54:02 AM    Referred By: KAYE CALHOUN           Confirmed By:KAMALA CAPPS MD     Results for orders placed during the hospital encounter of 04/10/24    Echo    Interpretation Summary    Left Ventricle: The left ventricle is severely dilated. Normal wall thickness. There is eccentric hypertrophy. Regional wall motion abnormalities present. Septal motion is consistent with bundle branch block. There is severely reduced systolic function with a visually estimated ejection fraction of 15 - 20%. Ejection fraction  by visual approximation is 20%.    Right Ventricle: Normal right ventricular cavity size. Wall thickness is normal. Right ventricle wall motion  is normal. Systolic function is normal.    Left Atrium: Left atrium is severely dilated.    Aortic Valve: The aortic valve is a trileaflet valve. There is moderate stenosis. Aortic valve area by VTI is 1.37 cm². Aortic valve peak velocity is 1.65 m/s. Mean gradient is 6 mmHg. The dimensionless index is 0.45.    Mitral Valve: There is mild to moderate regurgitation.    Tricuspid Valve: There is mild regurgitation.  No pulmonary hypertension.    IVC/SVC: Normal venous pressure at 3 mmHg.      Physical Exam  General: Well nourished, Cooperative, Alert and Oriented    Airway:  Mallampati: II   Mouth Opening: Normal  TM Distance: Normal  Tongue: Normal  Neck ROM: Normal ROM    Dental:  Intact, Periodontal disease  Patient denies any currently loose or chipped teeth; Patient denies any removable dental appliances    Chest/Lungs:  Normal Respiratory Rate    Heart:  Rate: Normal  Rhythm: Regular Rhythm        Anesthesia Plan  Type of Anesthesia, risks & benefits discussed:    Anesthesia Type: MAC, Gen Natural Airway  Intra-op Monitoring Plan: Standard ASA Monitors and Art Line  Post Op Pain Control Plan: multimodal analgesia and IV/PO Opioids PRN  Induction:  IV  Informed Consent: Informed consent signed with the Patient and all parties understand the risks and agree with anesthesia plan.  All questions answered.   ASA Score: 4  Day of Surgery Review of History & Physical: H&P Update referred to the surgeon/provider.    Ready For Surgery From Anesthesia Perspective.     .

## 2024-05-09 NOTE — ANESTHESIA POSTPROCEDURE EVALUATION
Anesthesia Post Evaluation    Patient: Jaqueline Gutierrez    Procedure(s) Performed: Procedure(s) (LRB):  INSERTION, ICD, BIVENTRICULAR/CRT-D (Left)    Final Anesthesia Type: MAC      Patient location during evaluation: GI PACU  Patient participation: Yes- Able to Participate  Level of consciousness: awake  Post-procedure vital signs: reviewed and stable  Pain management: adequate  Airway patency: patent    PONV status at discharge: No PONV  Anesthetic complications: no      Cardiovascular status: blood pressure returned to baseline and hemodynamically stable  Respiratory status: unassisted and spontaneous ventilation  Hydration status: euvolemic  Follow-up not needed.              Vitals Value Taken Time   /84 05/09/24 1052   Temp 36.6 °C (97.9 °F) 05/09/24 1052   Pulse 99 05/09/24 1052   Resp 18 05/09/24 1052   SpO2 100 % 05/09/24 1052         No case tracking events are documented in the log.      Pain/Nabeel Score: No data recorded

## 2024-05-09 NOTE — ANESTHESIA PROCEDURE NOTES
Arterial    Diagnosis: EF 20%    Patient location during procedure: done in OR  Timeout: 5/9/2024 3:20 PM  Procedure end time: 5/9/2024 3:34 PM    Staffing  Authorizing Provider: Charles Grimaldo MD  Performing Provider: Charles Grimaldo MD    Staffing  Other anesthesia staff: Malvin Schultz CRNA  Performed by: Charles Grimaldo MD  Authorized by: Charles Grimaldo MD    Anesthesiologist was present at the time of the procedure.    Preanesthetic Checklist  Completed: patient identified, IV checked, site marked, risks and benefits discussed, surgical consent, monitors and equipment checked, pre-op evaluation, timeout performed and anesthesia consent givenArterial  Skin Prep: chlorhexidine gluconate  Local Infiltration: lidocaine  Orientation: left  Location: radial    Catheter Size: 20 G  Catheter placement by Ultrasound guidance and Anatomical landmarks. Heme positive aspiration all ports.   Vessel Caliber: medium, patent, compressibility normal  Vascular Doppler:  not done  Needle advanced into vessel with real time Ultrasound guidance.Insertion Attempts: 2  Assessment  Dressing: secured with tape and tegaderm  Patient: Tolerated well

## 2024-05-09 NOTE — Clinical Note
The generator pocket was irrigated with Vancomycin 1G/500mL NS. Yes. She needs to have these weekly. Can she come later today? Ok to double book with ob with me.

## 2024-05-09 NOTE — Clinical Note
The lead was inserted under fluorscopic guidance and thresholds were tested. The lead was inserted in the coronary sinus.

## 2024-05-09 NOTE — TRANSFER OF CARE
"Anesthesia Transfer of Care Note    Patient: Jaqueline Gutierrez    Procedure(s) Performed: Procedure(s) (LRB):  INSERTION, ICD, BIVENTRICULAR/CRT-D (Left)    Patient location: PACU    Anesthesia Type: MAC    Transport from OR: Transported from OR on room air with adequate spontaneous ventilation    Post pain: adequate analgesia    Post assessment: no apparent anesthetic complications    Post vital signs: stable    Level of consciousness: awake    Nausea/Vomiting: no nausea/vomiting    Complications: none    Transfer of care protocol was followed      Last vitals: Visit Vitals  /84 (BP Location: Left arm, Patient Position: Lying)   Pulse 99   Temp 36.6 °C (97.9 °F) (Temporal)   Resp 18   Ht 5' 7" (1.702 m)   Wt 68 kg (150 lb)   LMP  (LMP Unknown)   SpO2 100%   Breastfeeding No   BMI 23.49 kg/m²     "

## 2024-05-10 NOTE — DISCHARGE INSTRUCTIONS
PACEMAKER/ICD INSTRUCTIONS    SAFETY  BECAUSE OF THE AFTEREFFECTS OF THE SEDATION YOU RECEIVED, WE ADVISE YOU TO REFRAIN FROM THE FOLLOWING ACTIVITIES FOR 24 HOURS.   1. DO NOT DRIVE OR OPERATE MACHINERY FOR 24 HOURS   2. DO NOT OPERATE APPLIANCES IF ALONE.     3.DON'T SIGN LEGAL PAPERS FOR 24 HOURS.     4. WE ADVISE THAT SOMEONE STAY WITH YOU AFTER THE PROCEDURE FOR AT LEAST 8 HOURS      DISCOMFORT: FOR GENERAL DISCOMFORT AT THE PUNCTURE, YOU MAY TAKE TYLENOL, 1-2 TABS EVERY 4-6 HOURS AS NEEDED.  DO NOT TAKE MORE THAN 4000 MG  IN 24 HOUR PERIOD.    ACTIVITY/ WOUND INSTRUCTIONS     AFFECTED ARM  DO NOT LIFT ARM ABOVE SHOULDER HEIGHT FOR 7 DAYS.                                  DO NOT  SWING ARM FOR 6 WEEKS                                DO NOT REMOVE DRESSING.  IT WILL BE REMOVED AT        FOLLOW UP APPOINTMENT                                YOU MAY SHOWER IN 48 HOURS.  DO NOT REMOVE THE DRESSING FOR SHOWER. USE HIBICLENS                                        SOAP ON CHEST AND NECK AREA  FOR 1 WEEK.  FACE AWAY FROM SPRAY WHEN SHOWERING                                                                                REMOVE SLING FOR SHOWER, THEN REAPPLY AFTER     SHOWER.                                USE ICE PACK FOR 48 HOURS .                                WEAR SLING AND SWATHE FOR 48 HOURS AROUND THE CLOCK THEN ONLY WHILE SLEEPING AT NIGHT FOR 6 WEEKS.              6. CALL YOUR HEALTHCARE PROVIDER IF YOU START TO HAVE THE FOLLOWING SYMPTOMS:                       1. High fever (101 degrees or higher)                 2. Redness,drainage or swelling  or intense pain at the incision site       3.  Drowsiness that doesn't get better       4. Weakness or dizziness that doesn't get better            5. Repeated vomiting                                                                                                                  NOZIN INSTRUCTIONS     GOAL: TO REDUCE THE RISK OF POST-PROCEDURAL INFECTIONS BY BACTERIA IN THE NASAL CAVITY.  THINK OF IT AS A HAND  FOR YOUR NOSE.       HOW TO USE:  1. SHAKE NOZIN BOTTLE WELL                            2. TAKE A COTTON SWAB AND APPLY FOUR DROPS TO TIP.                            3. INSERT COTTON SWAB INTO ONE NOSTRIL, BEING SURE NOT TO GO DEEPER INTO NOSE THAN THE TIP OF THE SWAB.                            4.SWAB NOSTRIL 6 TIMES CLOCKWISE AND 6 TIMES COUNERCLOCKWISE.  MAKE SURE TO SWAB THE INSIDE FRONT POCKET OF NOSTRIL.                             5. TAKE SWAB OUT AND APPLY 2 DROPS TO THE SAME COTTON TIP.  REPEAT STEPS 3 AND 4 IN THE OTHER NOSTRIL      DO STEPS 1-5 TWICE A DAY FOR 7 DAYS.

## 2024-05-10 NOTE — PLAN OF CARE
Went over discharge instructions with patient.   Stressed importance of making and keeping all follow ups as well as making prescribed medication changes.   Gave education material for safe mobility after discharge.   Walked patient to ensure patient was safe to go home.   Patient verbalized understanding and has no questions in regards to discharge.  IV removed, catheter intact.  Primary nurse notified of pt's discharge status.   Made sure patient has someone to drive them and explained not to drive for 24 hours.   Dressing to left upper chest wall CDI with no signs of bleeding or hematoma.   Left radial was the site of art line, hematoma present. Pressure wrap removed at this time.

## 2024-05-12 NOTE — DISCHARGE SUMMARY
[facility] - Cath Lab (Hospital)  Discharge Note  Short Stay    Procedure(s) (LRB):  INSERTION, ICD, BIVENTRICULAR/CRT-D (Left)      OUTCOME: Patient tolerated treatment/procedure well without complication and is now ready for discharge.    DISPOSITION: Home or Self Care    FINAL DIAGNOSIS:  now sp CRT-D implant     FOLLOWUP: In clinic    DISCHARGE INSTRUCTIONS:    Discharge Procedure Orders   Diet general        TIME SPENT ON DISCHARGE: 30 minutes

## 2024-05-13 ENCOUNTER — HOSPITAL ENCOUNTER (OUTPATIENT)
Dept: CARDIOLOGY | Facility: HOSPITAL | Age: 49
Discharge: HOME | End: 2024-05-13
Attending: INTERNAL MEDICINE
Payer: MEDICAID

## 2024-05-13 DIAGNOSIS — I50.42 CHRONIC COMBINED SYSTOLIC AND DIASTOLIC HEART FAILURE: ICD-10-CM

## 2024-05-13 DIAGNOSIS — I50.20 HEART FAILURE WITH REDUCED EJECTION FRACTION, NYHA CLASS III: ICD-10-CM

## 2024-05-13 DIAGNOSIS — Z45.02 ICD (IMPLANTABLE CARDIOVERTER-DEFIBRILLATOR) BATTERY DEPLETION: ICD-10-CM

## 2024-05-13 DIAGNOSIS — I44.7 LBBB (LEFT BUNDLE BRANCH BLOCK): ICD-10-CM

## 2024-05-13 PROCEDURE — 93284 PRGRMG EVAL IMPLANTABLE DFB: CPT | Mod: 26,,, | Performed by: INTERNAL MEDICINE

## 2024-05-13 PROCEDURE — 93284 PRGRMG EVAL IMPLANTABLE DFB: CPT

## 2024-05-15 ENCOUNTER — TELEPHONE (OUTPATIENT)
Dept: SMOKING CESSATION | Facility: CLINIC | Age: 49
End: 2024-05-15
Payer: MEDICAID

## 2024-05-15 VITALS
OXYGEN SATURATION: 92 % | HEART RATE: 78 BPM | RESPIRATION RATE: 20 BRPM | WEIGHT: 150 LBS | SYSTOLIC BLOOD PRESSURE: 124 MMHG | TEMPERATURE: 98 F | DIASTOLIC BLOOD PRESSURE: 84 MMHG | HEIGHT: 67 IN | BODY MASS INDEX: 23.54 KG/M2

## 2024-05-28 LAB
OHS CV AF BURDEN PERCENT: < 1
OHS CV BIV PACING PERCENT: 98 %
OHS CV DC REMOTE DEVICE TYPE: NORMAL
OHS CV RV PACING PERCENT: 0 %

## 2024-05-30 DIAGNOSIS — F17.200 NICOTINE DEPENDENCE: ICD-10-CM

## 2024-05-30 RX ORDER — BUPROPION HYDROCHLORIDE 150 MG/1
150 TABLET, EXTENDED RELEASE ORAL 2 TIMES DAILY
Qty: 52 TABLET | Refills: 0 | Status: SHIPPED | OUTPATIENT
Start: 2024-05-30

## 2024-05-30 RX ORDER — IBUPROFEN 200 MG
1 TABLET ORAL DAILY
Qty: 28 PATCH | Refills: 0 | Status: SHIPPED | OUTPATIENT
Start: 2024-05-30

## 2024-06-19 ENCOUNTER — CLINICAL SUPPORT (OUTPATIENT)
Dept: CARDIOLOGY | Facility: HOSPITAL | Age: 49
End: 2024-06-19
Attending: INTERNAL MEDICINE
Payer: MEDICAID

## 2024-06-19 ENCOUNTER — CLINICAL SUPPORT (OUTPATIENT)
Dept: CARDIOLOGY | Facility: HOSPITAL | Age: 49
End: 2024-06-19
Payer: MEDICAID

## 2024-06-19 DIAGNOSIS — I50.22 CHRONIC SYSTOLIC (CONGESTIVE) HEART FAILURE: ICD-10-CM

## 2024-06-19 DIAGNOSIS — I44.7 LEFT BUNDLE-BRANCH BLOCK, UNSPECIFIED: ICD-10-CM

## 2024-06-19 DIAGNOSIS — Z95.810 PRESENCE OF AUTOMATIC (IMPLANTABLE) CARDIAC DEFIBRILLATOR: ICD-10-CM

## 2024-06-19 LAB
OHS CV AF BURDEN PERCENT: < 1
OHS CV BIV PACING PERCENT: 100 %
OHS CV DC REMOTE DEVICE TYPE: NORMAL
OHS CV ICD SHOCK: NO
OHS CV RV PACING PERCENT: 100 %

## 2024-06-19 PROCEDURE — 93296 REM INTERROG EVL PM/IDS: CPT | Performed by: INTERNAL MEDICINE

## 2024-06-19 PROCEDURE — 93295 DEV INTERROG REMOTE 1/2/MLT: CPT | Mod: ,,, | Performed by: INTERNAL MEDICINE

## 2024-06-24 DIAGNOSIS — F17.200 NICOTINE DEPENDENCE: ICD-10-CM

## 2024-06-24 RX ORDER — BUPROPION HYDROCHLORIDE 150 MG/1
150 TABLET, EXTENDED RELEASE ORAL 2 TIMES DAILY
Qty: 52 TABLET | Refills: 0 | Status: SHIPPED | OUTPATIENT
Start: 2024-06-24

## 2024-06-24 NOTE — TELEPHONE ENCOUNTER
Refill Routing Note   Medication(s) are not appropriate for processing by [facility] for the following reason(s):        ED/Hospital Visit since last OV with provider  Patient not seen by provider within 15 months  New or recently adjusted medication  Responsible provider unclear    [facility] action(s):  Defer               Appointments  past 12m or future 3m with PCP    Date Provider   Last Visit   Visit date not found Jaylene Garcia MD   Next Visit   Visit date not found Jaylene Garcia MD   ED visits in past 90 days: 0        Note composed:3:20 PM 06/24/2024

## 2024-08-01 ENCOUNTER — NURSE TRIAGE (OUTPATIENT)
Dept: ADMINISTRATIVE | Facility: CLINIC | Age: 49
End: 2024-08-01
Payer: MEDICAID

## 2024-08-02 ENCOUNTER — TELEPHONE (OUTPATIENT)
Dept: CARDIOLOGY | Facility: CLINIC | Age: 49
End: 2024-08-02
Payer: MEDICAID

## 2024-08-02 NOTE — TELEPHONE ENCOUNTER
ICD implanted patient with 10# weight gain over night per patient. Patient is advised to the ED.    Reason for Disposition   Patient sounds very sick or weak to the triager    Additional Information   Negative: [1] Life-threatening reaction (anaphylaxis) in the past to similar substance (e.g., food, insect bite/sting, chemical, etc.) AND [2] < 2 hours since exposure   Negative: Unresponsive, passed out or very weak   Negative: Swollen tongue   Negative: Difficulty breathing or wheezing   Negative: Sounds like a life-threatening emergency to the triager   Negative: Followed a face injury   Negative: [1] Bee sting AND [2] within last 24 hours   Negative: Insect bite suspected   Negative: Swelling mainly of lip(s)   Negative: Swelling mainly of eyelid(s) and around the eyes   Negative: [1] SEVERE swelling (e.g., entire face) AND [2] < 2 hours since exposure to high-risk allergen (e.g., peanuts, tree nuts, fish, shellfish or 1st dose of drug) AND [3] no serious symptoms AND [4] no serious allergic reaction in the past   Negative: Fever   Negative: Taking an ACE Inhibitor medicine (e.g., benazepril / LOTENSIN, captopril / CAPOTEN, enalapril / VASOTEC, lisinopril / ZESTRIL)    Protocols used: Face Swelling-A-AH

## 2024-08-02 NOTE — TELEPHONE ENCOUNTER
Spoke to patient. She stated the she feels the same as when she went to the hospital. She has no chest pain, no  palpitation, she is short of breath and feels really tired. She stated that she has gained 7lbs since last week. Her face, hands and ankles have been swollen, but has since gone down. She has quit smoking and using nicoderm patch. Consuming more salt than she should. She does not have a BP log. Stating that her BP machine is broken and she can't afford another one.

## 2024-08-02 NOTE — TELEPHONE ENCOUNTER
Connection issue when talking with pt. Pt disconnected call. Attempted to call back, still couldn't hear pt. Attempted to call back two more times but call went straight to voicemail.    Reason for Disposition   Third attempt to contact caller AND no contact made. Phone number verified.    Protocols used: No Contact or Duplicate Contact Call-A-AH

## 2024-08-02 NOTE — TELEPHONE ENCOUNTER
Informed patient of lasix increase, sodium and fluid decrease. Patient verbalized undstanding. No further questions or concerns. HEAVEN    Spoke to patient. She stated the she feels the same as when she went to the hospital. She has no chest pain, no  palpitation, she is short of breath and feels really tired. She stated that she has gained 7lbs since last week. Her face, hands and ankles have been swollen, but has since gone down. She has quit smoking and using nicoderm patch. Consuming more salt than she should. She does not have a BP log. Stating that her BP machine is broken and she can't afford another one.

## 2024-08-12 ENCOUNTER — TELEPHONE (OUTPATIENT)
Dept: SMOKING CESSATION | Facility: CLINIC | Age: 49
End: 2024-08-12
Payer: MEDICAID

## 2024-08-12 NOTE — TELEPHONE ENCOUNTER
1st attempt, patient called in regards to 6 month f/u for quit 1 with Smoking Cessation.  Voicemail with contact information given.

## 2024-08-17 DIAGNOSIS — F17.200 NICOTINE DEPENDENCE: ICD-10-CM

## 2024-08-19 RX ORDER — BUPROPION HYDROCHLORIDE 150 MG/1
150 TABLET, EXTENDED RELEASE ORAL 2 TIMES DAILY
Qty: 60 TABLET | Refills: 2 | Status: SHIPPED | OUTPATIENT
Start: 2024-08-19

## 2024-08-19 RX ORDER — DM/P-EPHED/ACETAMINOPH/DOXYLAM 30-7.5/3
2 LIQUID (ML) ORAL
Qty: 81 LOZENGE | Refills: 0 | Status: SHIPPED | OUTPATIENT
Start: 2024-08-19

## 2024-08-19 RX ORDER — IBUPROFEN 200 MG
1 TABLET ORAL DAILY
Qty: 28 PATCH | Refills: 0 | Status: SHIPPED | OUTPATIENT
Start: 2024-08-19

## 2024-08-20 LAB
OHS QRS DURATION: 152 MS
OHS QTC CALCULATION: 532 MS

## 2024-08-26 ENCOUNTER — CLINICAL SUPPORT (OUTPATIENT)
Dept: CARDIOLOGY | Facility: HOSPITAL | Age: 49
End: 2024-08-26
Payer: MEDICAID

## 2024-08-26 DIAGNOSIS — I44.7 LEFT BUNDLE-BRANCH BLOCK, UNSPECIFIED: ICD-10-CM

## 2024-08-26 DIAGNOSIS — I50.22 CHRONIC SYSTOLIC (CONGESTIVE) HEART FAILURE: ICD-10-CM

## 2024-08-26 DIAGNOSIS — Z95.810 PRESENCE OF AUTOMATIC (IMPLANTABLE) CARDIAC DEFIBRILLATOR: ICD-10-CM

## 2024-09-04 ENCOUNTER — TELEPHONE (OUTPATIENT)
Dept: CARDIOLOGY | Facility: CLINIC | Age: 49
End: 2024-09-04
Payer: MEDICAID

## 2024-09-04 NOTE — TELEPHONE ENCOUNTER
Patient is being rescheduled----- Message from Liam Juarez sent at 9/4/2024  1:26 PM CDT -----  Contact: self  pt states she is on her way should arrive in the next 15 minutes, please call back at [phone] Thx CJ

## 2024-09-18 ENCOUNTER — CLINICAL SUPPORT (OUTPATIENT)
Dept: CARDIOLOGY | Facility: HOSPITAL | Age: 49
End: 2024-09-18
Payer: MEDICAID

## 2024-09-18 ENCOUNTER — CLINICAL SUPPORT (OUTPATIENT)
Dept: CARDIOLOGY | Facility: HOSPITAL | Age: 49
End: 2024-09-18
Attending: INTERNAL MEDICINE
Payer: MEDICAID

## 2024-09-18 DIAGNOSIS — I44.7 LEFT BUNDLE-BRANCH BLOCK, UNSPECIFIED: ICD-10-CM

## 2024-09-18 DIAGNOSIS — Z95.810 PRESENCE OF AUTOMATIC (IMPLANTABLE) CARDIAC DEFIBRILLATOR: ICD-10-CM

## 2024-09-18 DIAGNOSIS — I50.22 CHRONIC SYSTOLIC (CONGESTIVE) HEART FAILURE: ICD-10-CM

## 2024-09-18 LAB
OHS CV AF BURDEN PERCENT: < 1
OHS CV BIV PACING PERCENT: 100 %
OHS CV DC REMOTE DEVICE TYPE: NORMAL
OHS CV RV PACING PERCENT: 99 %

## 2024-09-18 PROCEDURE — 93295 DEV INTERROG REMOTE 1/2/MLT: CPT | Mod: ,,, | Performed by: INTERNAL MEDICINE

## 2024-09-18 PROCEDURE — 93296 REM INTERROG EVL PM/IDS: CPT | Performed by: INTERNAL MEDICINE

## 2024-10-11 ENCOUNTER — CLINICAL SUPPORT (OUTPATIENT)
Dept: CARDIOLOGY | Facility: HOSPITAL | Age: 49
End: 2024-10-11
Payer: MEDICAID

## 2024-10-11 DIAGNOSIS — I50.22 CHRONIC SYSTOLIC (CONGESTIVE) HEART FAILURE: ICD-10-CM

## 2024-10-11 DIAGNOSIS — Z95.810 PRESENCE OF AUTOMATIC (IMPLANTABLE) CARDIAC DEFIBRILLATOR: ICD-10-CM

## 2024-10-11 DIAGNOSIS — I44.7 LEFT BUNDLE-BRANCH BLOCK, UNSPECIFIED: ICD-10-CM

## 2024-10-14 DIAGNOSIS — I50.22 CHRONIC SYSTOLIC (CONGESTIVE) HEART FAILURE: Primary | ICD-10-CM

## 2024-10-15 ENCOUNTER — LAB VISIT (OUTPATIENT)
Dept: LAB | Facility: HOSPITAL | Age: 49
End: 2024-10-15
Attending: NURSE PRACTITIONER
Payer: MEDICAID

## 2024-10-15 ENCOUNTER — HOSPITAL ENCOUNTER (OUTPATIENT)
Dept: CARDIOLOGY | Facility: HOSPITAL | Age: 49
Discharge: HOME | End: 2024-10-15
Attending: INTERNAL MEDICINE
Payer: MEDICAID

## 2024-10-15 ENCOUNTER — OFFICE VISIT (OUTPATIENT)
Dept: CARDIOLOGY | Facility: CLINIC | Age: 49
End: 2024-10-15
Payer: MEDICAID

## 2024-10-15 VITALS
HEART RATE: 80 BPM | BODY MASS INDEX: 25.26 KG/M2 | WEIGHT: 160.94 LBS | HEIGHT: 67 IN | DIASTOLIC BLOOD PRESSURE: 82 MMHG | OXYGEN SATURATION: 96 % | SYSTOLIC BLOOD PRESSURE: 124 MMHG

## 2024-10-15 DIAGNOSIS — I50.20 HEART FAILURE WITH REDUCED EJECTION FRACTION, NYHA CLASS III: ICD-10-CM

## 2024-10-15 DIAGNOSIS — I44.7 LBBB (LEFT BUNDLE BRANCH BLOCK): ICD-10-CM

## 2024-10-15 DIAGNOSIS — I50.20 HEART FAILURE WITH REDUCED EJECTION FRACTION, NYHA CLASS III: Primary | ICD-10-CM

## 2024-10-15 DIAGNOSIS — Z45.02 ICD (IMPLANTABLE CARDIOVERTER-DEFIBRILLATOR) BATTERY DEPLETION: ICD-10-CM

## 2024-10-15 DIAGNOSIS — I35.0 NONRHEUMATIC AORTIC VALVE STENOSIS: ICD-10-CM

## 2024-10-15 DIAGNOSIS — Z95.810 CARDIAC RESYNCHRONIZATION THERAPY DEFIBRILLATOR (CRT-D) IN PLACE: ICD-10-CM

## 2024-10-15 DIAGNOSIS — I50.22 CHF (CONGESTIVE HEART FAILURE), NYHA CLASS III, CHRONIC, SYSTOLIC: ICD-10-CM

## 2024-10-15 DIAGNOSIS — R00.2 PALPITATIONS: ICD-10-CM

## 2024-10-15 DIAGNOSIS — Z78.9 EX-SMOKER FOR LESS THAN 1 YEAR: ICD-10-CM

## 2024-10-15 DIAGNOSIS — I50.42 CHRONIC COMBINED SYSTOLIC AND DIASTOLIC HEART FAILURE: ICD-10-CM

## 2024-10-15 PROBLEM — Z72.0 TOBACCO USE: Status: RESOLVED | Noted: 2024-01-21 | Resolved: 2024-10-15

## 2024-10-15 LAB
ALBUMIN SERPL BCP-MCNC: 4.2 G/DL (ref 3.5–5.2)
ALP SERPL-CCNC: 103 U/L (ref 55–135)
ALT SERPL W/O P-5'-P-CCNC: 24 U/L (ref 10–44)
ANION GAP SERPL CALC-SCNC: 12 MMOL/L (ref 8–16)
AST SERPL-CCNC: 27 U/L (ref 10–40)
BILIRUB SERPL-MCNC: 0.7 MG/DL (ref 0.1–1)
BNP SERPL-MCNC: 2156 PG/ML (ref 0–99)
BUN SERPL-MCNC: 26 MG/DL (ref 6–20)
CALCIUM SERPL-MCNC: 10.1 MG/DL (ref 8.7–10.5)
CHLORIDE SERPL-SCNC: 100 MMOL/L (ref 95–110)
CO2 SERPL-SCNC: 27 MMOL/L (ref 23–29)
CREAT SERPL-MCNC: 1.8 MG/DL (ref 0.5–1.4)
EST. GFR  (NO RACE VARIABLE): 34 ML/MIN/1.73 M^2
GLUCOSE SERPL-MCNC: 107 MG/DL (ref 70–110)
OHS CV AF BURDEN PERCENT: < 1
OHS CV BIV PACING PERCENT: 99 %
OHS CV DC REMOTE DEVICE TYPE: NORMAL
OHS CV RV PACING PERCENT: 0 %
POTASSIUM SERPL-SCNC: 4.1 MMOL/L (ref 3.5–5.1)
PROT SERPL-MCNC: 7.6 G/DL (ref 6–8.4)
SODIUM SERPL-SCNC: 139 MMOL/L (ref 136–145)

## 2024-10-15 PROCEDURE — 93284 PRGRMG EVAL IMPLANTABLE DFB: CPT

## 2024-10-15 PROCEDURE — 3044F HG A1C LEVEL LT 7.0%: CPT | Mod: CPTII,,, | Performed by: NURSE PRACTITIONER

## 2024-10-15 PROCEDURE — 93284 PRGRMG EVAL IMPLANTABLE DFB: CPT | Mod: 26,,, | Performed by: INTERNAL MEDICINE

## 2024-10-15 PROCEDURE — 99214 OFFICE O/P EST MOD 30 MIN: CPT | Mod: S$PBB,,, | Performed by: NURSE PRACTITIONER

## 2024-10-15 PROCEDURE — 1159F MED LIST DOCD IN RCRD: CPT | Mod: CPTII,,, | Performed by: NURSE PRACTITIONER

## 2024-10-15 PROCEDURE — 80053 COMPREHEN METABOLIC PANEL: CPT | Performed by: NURSE PRACTITIONER

## 2024-10-15 PROCEDURE — 3008F BODY MASS INDEX DOCD: CPT | Mod: CPTII,,, | Performed by: NURSE PRACTITIONER

## 2024-10-15 PROCEDURE — 3074F SYST BP LT 130 MM HG: CPT | Mod: CPTII,,, | Performed by: NURSE PRACTITIONER

## 2024-10-15 PROCEDURE — 36415 COLL VENOUS BLD VENIPUNCTURE: CPT | Performed by: NURSE PRACTITIONER

## 2024-10-15 PROCEDURE — 4010F ACE/ARB THERAPY RXD/TAKEN: CPT | Mod: CPTII,,, | Performed by: NURSE PRACTITIONER

## 2024-10-15 PROCEDURE — 83880 ASSAY OF NATRIURETIC PEPTIDE: CPT | Performed by: NURSE PRACTITIONER

## 2024-10-15 PROCEDURE — 3079F DIAST BP 80-89 MM HG: CPT | Mod: CPTII,,, | Performed by: NURSE PRACTITIONER

## 2024-10-15 PROCEDURE — 99999 PR PBB SHADOW E&M-EST. PATIENT-LVL IV: CPT | Mod: PBBFAC,,, | Performed by: NURSE PRACTITIONER

## 2024-10-15 PROCEDURE — 99214 OFFICE O/P EST MOD 30 MIN: CPT | Mod: PBBFAC | Performed by: NURSE PRACTITIONER

## 2024-10-15 PROCEDURE — 83880 ASSAY OF NATRIURETIC PEPTIDE: CPT | Mod: 91 | Performed by: NURSE PRACTITIONER

## 2024-10-15 RX ORDER — ALBUTEROL SULFATE 90 UG/1
2 INHALANT RESPIRATORY (INHALATION) EVERY 4 HOURS PRN
COMMUNITY
Start: 2024-08-26

## 2024-10-15 RX ORDER — IPRATROPIUM BROMIDE 42 UG/1
2 SPRAY, METERED NASAL
COMMUNITY
Start: 2024-07-02 | End: 2024-12-29

## 2024-10-15 NOTE — PROGRESS NOTES
Subjective:   Patient ID:  Jaqueline Gutierrez is a 49 y.o. female who presents for evaluation of No chief complaint on file.      HPI    Jaqueline Gutierrez is a 49 year old female who presents to Arrhythmia clinic for CHF follow up/discuss CRT implant.    Her current medical conditions include CHF, HFrEF of 20%, LifeVest, Thyroid disease, LBBB, Aortic Stenosis, MR, TR, CAD.     She has had numerous medication failures due to hypotension and dizziness/LH.     She is only taking Toprol XL 12.5 mg BID and Lasix 20 mg BID.     Unable to tolerate Entresto due to hypotension and near syncopal events.     She does endorse shortness of breath with any exertional activities. She does have bendopnea as well today.     Today, she is NYHA Stage C FC III.     Denies chest pain or anginal equivalents. Denies orthopnea, PND or abdominal bloating. Reports regular walking without any issues lately. NO leg swelling or claudications. No recent falls, syncope or near syncopal events. Reports compliance with medications and dietary restrictions. NO CNS complaints to suggest TIA or CVA today. No signs of abnormal bleeding on ASA daily.     Ambulated in hallway- developed shortness of breath and increased work of breathing after 150 feet.     10/15/2024 update    Jaqueline Gutierrez returns for 3 month follow up with device check.     Device check completed today in office- well functioning device, 3 episodes of VT on 10/10/2024 along with missed dose of BB and overheated. No recurrent arrhythmias since October 10th.   %, 0 PVC burden.     Reports compliance with medications.     BP well controlled today in office.   Doing well with sodium restrictions.     Feeling better since device implant.     Denies chest pain or anginal equivalents. No shortness of breath, JOHNSON or palpitations. Denies orthopnea, PND or abdominal bloating. Reports regular walking without any issues lately. NO leg swelling or claudications. No recent  falls, syncope or near syncopal events. Reports compliance with medications and dietary restrictions. NO CNS complaints to suggest TIA or CVA today. No signs of abnormal bleeding on ASA daily.       Past Medical History:   Diagnosis Date    Elevated troponin 2024    Substance abuse     Thyroid disease        Past Surgical History:   Procedure Laterality Date    CATHETERIZATION OF BOTH LEFT AND RIGHT HEART N/A 2024    Procedure: CATHETERIZATION, HEART, BOTH LEFT AND RIGHT;  Surgeon: Henry Menard MD;  Location: [facility] CATH LAB;  Service: Cardiology;  Laterality: N/A;     SECTION      HYSTERECTOMY      INSERTION OF BIVENTRICULAR IMPLANTABLE CARDIOVERTER-DEFIBRILLATOR (ICD) Left 2024    Procedure: INSERTION, ICD, BIVENTRICULAR/CRT-D;  Surgeon: Kel Garcia MD;  Location: [facility] CATH LAB;  Service: Cardiology;  Laterality: Left;  [facility]    KNEE SURGERY         Social History     Tobacco Use    Smoking status: Former     Current packs/day: 0.50     Average packs/day: 0.5 packs/day for 39.8 years (19.9 ttl pk-yrs)     Types: Cigarettes     Start date:      Passive exposure: Current    Smokeless tobacco: Never   Substance Use Topics    Alcohol use: Yes     Comment: occasional    Drug use: Yes     Types: Marijuana, Methamphetamines       No family history on file.    Wt Readings from Last 3 Encounters:   10/15/24 73 kg (160 lb 15 oz)   24 68 kg (150 lb)   24 68.1 kg (150 lb 2.1 oz)     Temp Readings from Last 3 Encounters:   24 98.1 °F (36.7 °C)   24 98.3 °F (36.8 °C)   23 98 °F (36.7 °C) (Oral)     BP Readings from Last 3 Encounters:   10/15/24 124/82   24 124/84   24 108/70     Pulse Readings from Last 3 Encounters:   10/15/24 80   24 78   24 88       Current Outpatient Medications on File Prior to Visit   Medication Sig Dispense Refill    aspirin (ECOTRIN) 81 MG EC tablet Take 1 tablet (81 mg total) by mouth once daily. 90  tablet 3    cetirizine (ZYRTEC) 10 MG tablet Take 1 tablet by mouth every morning.      furosemide (LASIX) 20 MG tablet Take 1 tablet (20 mg total) by mouth 2 (two) times daily. 60 tablet 11    metoprolol succinate (TOPROL-XL) 25 MG 24 hr tablet Take 12.5 mg by mouth 2 (two) times daily.      nitroGLYCERIN (NITROSTAT) 0.4 MG SL tablet Place 1 tablet (0.4 mg total) under the tongue every 5 (five) minutes as needed for Chest pain. 25 tablet 1    ondansetron (ZOFRAN-ODT) 4 MG TbDL Take 2 tablets (8 mg total) by mouth 2 (two) times daily. 30 tablet 0    albuterol (PROVENTIL/VENTOLIN HFA) 90 mcg/actuation inhaler Inhale 2 puffs into the lungs every 4 (four) hours as needed. (Patient not taking: Reported on 10/15/2024)      buPROPion (WELLBUTRIN SR) 150 MG TBSR 12 hr tablet Take 1 tablet (150 mg total) by mouth 2 (two) times daily. 60 tablet 2    ipratropium (ATROVENT) 42 mcg (0.06 %) nasal spray 2 sprays by Nasal route. (Patient not taking: Reported on 10/15/2024)      nicotine (NICODERM CQ) 21 mg/24 hr Place 1 patch onto the skin once daily. Dispense Clear patches please. Fill Medicaid 28 patch 0    nicotine polacrilex 2 MG Lozg Take 1 lozenge (2 mg total) by mouth as needed (as needed). Dispense MINIs please. DO NOT CHEW UP. Can take 1-2 per hour in place of a cigarette for breakthrough cravings in place of a cigarette. 81 lozenge 0    rosuvastatin (CRESTOR) 20 MG tablet Take 1 tablet (20 mg total) by mouth once daily. 90 tablet 3     No current facility-administered medications on file prior to visit.       Holter monitor - 48 hour    Result Date: 5/8/2024    The predominant rhythm is sinus.    Left bundle-branch block.    Autonomic profile:  Moderate Hyperadrenergia, some hypo vagotonia.         Results for orders placed during the hospital encounter of 04/10/24    Echo    Interpretation Summary    Left Ventricle: The left ventricle is severely dilated. Normal wall thickness. There is eccentric hypertrophy. Regional  wall motion abnormalities present. Septal motion is consistent with bundle branch block. There is severely reduced systolic function with a visually estimated ejection fraction of 15 - 20%. Ejection fraction by visual approximation is 20%.    Right Ventricle: Normal right ventricular cavity size. Wall thickness is normal. Right ventricle wall motion  is normal. Systolic function is normal.    Left Atrium: Left atrium is severely dilated.    Aortic Valve: The aortic valve is a trileaflet valve. There is moderate stenosis. Aortic valve area by VTI is 1.37 cm². Aortic valve peak velocity is 1.65 m/s. Mean gradient is 6 mmHg. The dimensionless index is 0.45.    Mitral Valve: There is mild to moderate regurgitation.    Tricuspid Valve: There is mild regurgitation.  No pulmonary hypertension.    IVC/SVC: Normal venous pressure at 3 mmHg.    No results found for this or any previous visit.        Results for orders placed or performed in visit on 05/09/24   EKG 12-lead    Collection Time: 05/09/24  6:26 PM   Result Value Ref Range    QRS Duration 152 ms    [facility] QTC Calculation 532 ms    Narrative    Test Reason : post ppm/aicd    Vent. Rate : 080 BPM     Atrial Rate : 080 BPM     P-R Int : 130 ms          QRS Dur : 152 ms      QT Int : 462 ms       P-R-T Axes : 067 -22 095 degrees     QTc Int : 532 ms    Atrial-sensed ventricular-paced rhythm  Biventricular pacemaker detected  Abnormal ECG  When compared with ECG of 29-JUL-2004 08:59,  Electronic ventricular pacemaker has replaced Sinus rhythm  Confirmed by MD MARIA ELENA, ANUPAMA ([id number]) on 8/20/2024 11:52:31 AM    Referred By:             Confirmed By:ANUPAMA ARREDONDO MD         Review of Systems   Constitutional: Positive for malaise/fatigue.   HENT:  Negative for hearing loss and hoarse voice.    Eyes:  Negative for blurred vision and visual disturbance.   Cardiovascular:  Positive for dyspnea on exertion and palpitations. Negative for chest pain, claudication, irregular heartbeat, leg  "swelling, near-syncope, orthopnea, paroxysmal nocturnal dyspnea and syncope.   Respiratory:  Negative for cough, hemoptysis, shortness of breath, sleep disturbances due to breathing, snoring and wheezing.    Endocrine: Negative for cold intolerance and heat intolerance.   Hematologic/Lymphatic: Does not bruise/bleed easily.   Skin:  Negative for color change, dry skin and nail changes.   Musculoskeletal:  Positive for arthritis and back pain. Negative for joint pain and myalgias.   Gastrointestinal:  Positive for bloating. Negative for abdominal pain, constipation, nausea and vomiting.   Genitourinary:  Negative for dysuria, flank pain, hematuria and hesitancy.   Neurological:  Negative for headaches, light-headedness, loss of balance, numbness, paresthesias and weakness.   Psychiatric/Behavioral:  Negative for altered mental status.    Allergic/Immunologic: Negative for environmental allergies.         Objective:/82   Pulse 80   Ht 5' 7" (1.702 m)   Wt 73 kg (160 lb 15 oz)   LMP  (LMP Unknown)   SpO2 96%   BMI 25.21 kg/m²      Physical Exam  Vitals and nursing note reviewed.   Constitutional:       General: She is not in acute distress.     Appearance: Normal appearance. She is well-developed. She is not ill-appearing.   HENT:      Head: Normocephalic and atraumatic.      Nose: Nose normal.      Mouth/Throat:      Mouth: Mucous membranes are moist.   Eyes:      Pupils: Pupils are equal, round, and reactive to light.   Neck:      Thyroid: No thyromegaly.      Vascular: No JVD.      Trachea: No tracheal deviation.   Cardiovascular:      Rate and Rhythm: Normal rate and regular rhythm.      Chest Wall: PMI is not displaced.      Pulses: Intact distal pulses.           Radial pulses are 2+ on the right side and 2+ on the left side.        Dorsalis pedis pulses are 2+ on the right side and 2+ on the left side.      Heart sounds: S1 normal and S2 normal. Heart sounds not distant. No murmur heard.  Pulmonary: "      Effort: Pulmonary effort is normal. No respiratory distress.      Breath sounds: Normal breath sounds. No wheezing.   Abdominal:      General: Bowel sounds are normal. There is distension.      Palpations: Abdomen is soft.      Tenderness: There is no abdominal tenderness.   Musculoskeletal:         General: No swelling. Normal range of motion.      Cervical back: Full passive range of motion without pain, normal range of motion and neck supple.      Right lower leg: No edema.      Left lower leg: No edema.      Right ankle: No swelling.      Left ankle: No swelling.   Skin:     General: Skin is warm and dry.      Capillary Refill: Capillary refill takes less than 2 seconds.      Nails: There is no clubbing.   Neurological:      General: No focal deficit present.      Mental Status: She is alert and oriented to person, place, and time.      Motor: No weakness.   Psychiatric:         Speech: Speech normal.         Behavior: Behavior normal.         Thought Content: Thought content normal.         Judgment: Judgment normal.         Lab Results   Component Value Date    CHOL 215 (H) 01/22/2024     Lab Results   Component Value Date    HDL 39 (L) 01/22/2024     Lab Results   Component Value Date    LDLCALC 151.4 01/22/2024     Lab Results   Component Value Date    TRIG 123 01/22/2024     Lab Results   Component Value Date    CHOLHDL 18.1 (L) 01/22/2024       Chemistry        Component Value Date/Time     05/09/2024 1054    K 4.1 05/09/2024 1054     05/09/2024 1054    CO2 26 05/09/2024 1054    BUN 18 05/09/2024 1054    CREATININE 1.4 05/09/2024 1054     05/09/2024 1054        Component Value Date/Time    CALCIUM 9.4 05/09/2024 1054    ALKPHOS 79 04/23/2024 1053    AST 28 04/23/2024 1053    ALT 27 04/23/2024 1053    BILITOT 1.0 04/23/2024 1053    ESTGFRAFRICA >60 07/17/2021 0639    EGFRNONAA >60 07/17/2021 0639          Lab Results   Component Value Date    TSH 0.904 01/21/2024     Lab Results    Component Value Date    INR 1.0 04/23/2024    INR 1.0 12/30/2023    INR 1.0 05/31/2010     Lab Results   Component Value Date    WBC 4.97 04/23/2024    HGB 14.7 04/23/2024    HCT 43.3 04/23/2024    MCV 94 04/23/2024     04/23/2024          Assessment:      1. Heart failure with reduced ejection fraction, NYHA class III    2. Cardiac resynchronization therapy defibrillator (CRT-D) in place    3. LBBB (left bundle branch block)    4. Palpitations    5. Nonrheumatic aortic valve stenosis    6. CHF (congestive heart failure), NYHA class III, chronic, systolic    7. Ex-smoker for less than 1 year          Plan:     Unable to tolerate ARNI or SGLT2 or MRAS due to hypotension and near syncopal events.  Referral for Remote cardiac rehab    Continue Toprol XL 12.5 mg BID  Continue Lasix 20 mg BID  Dash diet 2 gm sodium restriction  1500 ml fluid restrictions  Daily weights  Update CMP BNP Pro BNP today.     RTC in 6 m with device check.     Nicole May, FNP-C [facility]

## 2024-10-16 LAB — NT-PROBNP SERPL IA-MCNC: ABNORMAL PG/ML

## 2024-10-28 ENCOUNTER — PATIENT MESSAGE (OUTPATIENT)
Dept: CARDIOLOGY | Facility: CLINIC | Age: 49
End: 2024-10-28
Payer: MEDICAID

## 2024-10-29 ENCOUNTER — HOSPITAL ENCOUNTER (INPATIENT)
Facility: HOSPITAL | Age: 49
LOS: 1 days | Discharge: LEFT BEFORE DISCHARGE | DRG: 292 | End: 2024-10-29
Attending: EMERGENCY MEDICINE | Admitting: FAMILY MEDICINE
Payer: MEDICAID

## 2024-10-29 VITALS
RESPIRATION RATE: 18 BRPM | OXYGEN SATURATION: 97 % | TEMPERATURE: 99 F | SYSTOLIC BLOOD PRESSURE: 110 MMHG | WEIGHT: 161 LBS | BODY MASS INDEX: 25.27 KG/M2 | DIASTOLIC BLOOD PRESSURE: 71 MMHG | HEIGHT: 67 IN | HEART RATE: 96 BPM

## 2024-10-29 DIAGNOSIS — I50.9 CONGESTIVE HEART FAILURE, UNSPECIFIED HF CHRONICITY, UNSPECIFIED HEART FAILURE TYPE: ICD-10-CM

## 2024-10-29 DIAGNOSIS — R79.89 ELEVATED TROPONIN: Primary | ICD-10-CM

## 2024-10-29 DIAGNOSIS — I50.9 CHF (CONGESTIVE HEART FAILURE): ICD-10-CM

## 2024-10-29 DIAGNOSIS — R10.10 UPPER ABDOMINAL PAIN: ICD-10-CM

## 2024-10-29 DIAGNOSIS — R07.9 CHEST PAIN: ICD-10-CM

## 2024-10-29 PROBLEM — E87.6 HYPOKALEMIA: Status: ACTIVE | Noted: 2024-10-29

## 2024-10-29 PROBLEM — R10.9 ABDOMINAL PAIN: Status: ACTIVE | Noted: 2024-10-29

## 2024-10-29 PROBLEM — I25.10 CORONARY ARTERY DISEASE INVOLVING NATIVE CORONARY ARTERY WITHOUT ANGINA PECTORIS: Status: ACTIVE | Noted: 2024-10-29

## 2024-10-29 LAB
ALBUMIN SERPL BCP-MCNC: 3.7 G/DL (ref 3.5–5.2)
ALP SERPL-CCNC: 93 U/L (ref 40–150)
ALT SERPL W/O P-5'-P-CCNC: 26 U/L (ref 10–44)
AMPHET+METHAMPHET UR QL: ABNORMAL
ANION GAP SERPL CALC-SCNC: 10 MMOL/L (ref 8–16)
AORTIC ROOT ANNULUS: 2.95 CM
ASCENDING AORTA: 3.1 CM
AST SERPL-CCNC: 28 U/L (ref 10–40)
AV INDEX (PROSTH): 0.38
AV MEAN GRADIENT: 3.8 MMHG
AV PEAK GRADIENT: 7.8 MMHG
AV VALVE AREA BY VELOCITY RATIO: 1.5 CM²
AV VALVE AREA: 1.6 CM²
AV VELOCITY RATIO: 0.36
BACTERIA #/AREA URNS HPF: ABNORMAL /HPF
BARBITURATES UR QL SCN>200 NG/ML: NEGATIVE
BASOPHILS # BLD AUTO: 0.03 K/UL (ref 0–0.2)
BASOPHILS NFR BLD: 0.6 % (ref 0–1.9)
BENZODIAZ UR QL SCN>200 NG/ML: NEGATIVE
BILIRUB SERPL-MCNC: 0.9 MG/DL (ref 0.1–1)
BILIRUB UR QL STRIP: NEGATIVE
BNP SERPL-MCNC: 2450 PG/ML (ref 0–99)
BSA FOR ECHO PROCEDURE: 1.86 M2
BUN SERPL-MCNC: 11 MG/DL (ref 6–20)
BZE UR QL SCN: NEGATIVE
CALCIUM SERPL-MCNC: 9.4 MG/DL (ref 8.7–10.5)
CANNABINOIDS UR QL SCN: ABNORMAL
CHLORIDE SERPL-SCNC: 98 MMOL/L (ref 95–110)
CLARITY UR: CLEAR
CO2 SERPL-SCNC: 30 MMOL/L (ref 23–29)
COLOR UR: YELLOW
CREAT SERPL-MCNC: 1.3 MG/DL (ref 0.5–1.4)
CREAT UR-MCNC: 188.2 MG/DL (ref 15–325)
CV ECHO LV RWT: 0.24 CM
DIFFERENTIAL METHOD BLD: ABNORMAL
DOP CALC AO PEAK VEL: 1.4 M/S
DOP CALC AO VTI: 19.6 CM
DOP CALC LVOT AREA: 4.2 CM2
DOP CALC LVOT DIAMETER: 2.3 CM
DOP CALC LVOT PEAK VEL: 0.5 M/S
DOP CALC LVOT STROKE VOLUME: 30.7 CM3
DOP CALC RVOT PEAK VEL: 0.76 M/S
DOP CALC RVOT VTI: 16.2 CM
DOP CALCLVOT PEAK VEL VTI: 7.4 CM
ECHO LV POSTERIOR WALL: 1 CM (ref 0.6–1.1)
EOSINOPHIL # BLD AUTO: 0.2 K/UL (ref 0–0.5)
EOSINOPHIL NFR BLD: 3.1 % (ref 0–8)
ERYTHROCYTE [DISTWIDTH] IN BLOOD BY AUTOMATED COUNT: 11.9 % (ref 11.5–14.5)
EST. GFR  (NO RACE VARIABLE): 50 ML/MIN/1.73 M^2
ESTIMATED AVG GLUCOSE: 108 MG/DL (ref 68–131)
FRACTIONAL SHORTENING: 5.9 % (ref 28–44)
GLUCOSE SERPL-MCNC: 124 MG/DL (ref 70–110)
GLUCOSE UR QL STRIP: NEGATIVE
HBA1C MFR BLD: 5.4 % (ref 4–5.6)
HCT VFR BLD AUTO: 43.9 % (ref 37–48.5)
HGB BLD-MCNC: 15.7 G/DL (ref 12–16)
HGB UR QL STRIP: NEGATIVE
HYALINE CASTS #/AREA URNS LPF: 0 /LPF
IMM GRANULOCYTES # BLD AUTO: 0.01 K/UL (ref 0–0.04)
IMM GRANULOCYTES NFR BLD AUTO: 0.2 % (ref 0–0.5)
INTERVENTRICULAR SEPTUM: 0.7 CM (ref 0.6–1.1)
IVC DIAMETER: 0.99 CM
IVRT: 125.59 MSEC
KETONES UR QL STRIP: NEGATIVE
LA MAJOR: 5.71 CM
LA MINOR: 5.17 CM
LA WIDTH: 3.2 CM
LEFT ATRIUM SIZE: 4.09 CM
LEFT ATRIUM VOLUME INDEX: 32.8 ML/M2
LEFT ATRIUM VOLUME: 60.37 CM3
LEFT INTERNAL DIMENSION IN SYSTOLE: 8 CM (ref 2.1–4)
LEFT VENTRICLE DIASTOLIC VOLUME INDEX: 214.41 ML/M2
LEFT VENTRICLE DIASTOLIC VOLUME: 394.51 ML
LEFT VENTRICLE MASS INDEX: 202.5 G/M2
LEFT VENTRICLE SYSTOLIC VOLUME INDEX: 189.1 ML/M2
LEFT VENTRICLE SYSTOLIC VOLUME: 347.98 ML
LEFT VENTRICULAR INTERNAL DIMENSION IN DIASTOLE: 8.5 CM (ref 3.5–6)
LEFT VENTRICULAR MASS: 372.6 G
LEUKOCYTE ESTERASE UR QL STRIP: ABNORMAL
LIPASE SERPL-CCNC: 35 U/L (ref 4–60)
LVED V (TEICH): 394.51 ML
LVES V (TEICH): 347.98 ML
LVOT MG: 0.78 MMHG
LVOT MV: 0.42 CM/S
LYMPHOCYTES # BLD AUTO: 1.2 K/UL (ref 1–4.8)
LYMPHOCYTES NFR BLD: 23.5 % (ref 18–48)
MAGNESIUM SERPL-MCNC: 1.9 MG/DL (ref 1.6–2.6)
MCH RBC QN AUTO: 32 PG (ref 27–31)
MCHC RBC AUTO-ENTMCNC: 35.8 G/DL (ref 32–36)
MCV RBC AUTO: 89 FL (ref 82–98)
METHADONE UR QL SCN>300 NG/ML: NEGATIVE
MICROSCOPIC COMMENT: ABNORMAL
MONOCYTES # BLD AUTO: 0.3 K/UL (ref 0.3–1)
MONOCYTES NFR BLD: 5.3 % (ref 4–15)
NEUTROPHILS # BLD AUTO: 3.4 K/UL (ref 1.8–7.7)
NEUTROPHILS NFR BLD: 67.3 % (ref 38–73)
NITRITE UR QL STRIP: NEGATIVE
NRBC BLD-RTO: 0 /100 WBC
OPIATES UR QL SCN: NEGATIVE
PCP UR QL SCN>25 NG/ML: NEGATIVE
PH UR STRIP: 8 [PH] (ref 5–8)
PISA MRMAX VEL: 4.26 M/S
PLATELET # BLD AUTO: 226 K/UL (ref 150–450)
PMV BLD AUTO: 9.9 FL (ref 9.2–12.9)
POTASSIUM SERPL-SCNC: 3.4 MMOL/L (ref 3.5–5.1)
PROT SERPL-MCNC: 7.1 G/DL (ref 6–8.4)
PROT UR QL STRIP: ABNORMAL
PULM VEIN S/D RATIO: 1.04
PV MEAN GRADIENT: 2 MMHG
PV PEAK D VEL: 0.5 M/S
PV PEAK S VEL: 0.52 M/S
RA MAJOR: 4.71 CM
RA PRESSURE ESTIMATED: 3 MMHG
RA WIDTH: 2.6 CM
RBC # BLD AUTO: 4.91 M/UL (ref 4–5.4)
RBC #/AREA URNS HPF: 4 /HPF (ref 0–4)
SODIUM SERPL-SCNC: 138 MMOL/L (ref 136–145)
SP GR UR STRIP: 1.02 (ref 1–1.03)
SQUAMOUS #/AREA URNS HPF: 6 /HPF
STJ: 3.11 CM
T4 FREE SERPL-MCNC: 0.97 NG/DL (ref 0.71–1.51)
TDI LATERAL: 0.07 M/S
TDI SEPTAL: 0.06 M/S
TDI: 0.07 M/S
TOXICOLOGY INFORMATION: ABNORMAL
TRICUSPID ANNULAR PLANE SYSTOLIC EXCURSION: 1.69 CM
TROPONIN I SERPL DL<=0.01 NG/ML-MCNC: 0.09 NG/ML (ref 0–0.03)
TROPONIN I SERPL DL<=0.01 NG/ML-MCNC: 0.1 NG/ML (ref 0–0.03)
TROPONIN I SERPL DL<=0.01 NG/ML-MCNC: 0.1 NG/ML (ref 0–0.03)
TSH SERPL DL<=0.005 MIU/L-ACNC: 0.92 UIU/ML (ref 0.4–4)
URN SPEC COLLECT METH UR: ABNORMAL
UROBILINOGEN UR STRIP-ACNC: NEGATIVE EU/DL
WBC # BLD AUTO: 5.11 K/UL (ref 3.9–12.7)
WBC #/AREA URNS HPF: 6 /HPF (ref 0–5)
WBC CLUMPS URNS QL MICRO: ABNORMAL
Z-SCORE OF LEFT VENTRICULAR DIMENSION IN END DIASTOLE: 5.1
Z-SCORE OF LEFT VENTRICULAR DIMENSION IN END SYSTOLE: 7.37

## 2024-10-29 PROCEDURE — 25000003 PHARM REV CODE 250: Performed by: FAMILY MEDICINE

## 2024-10-29 PROCEDURE — 83735 ASSAY OF MAGNESIUM: CPT | Performed by: FAMILY MEDICINE

## 2024-10-29 PROCEDURE — 84484 ASSAY OF TROPONIN QUANT: CPT | Mod: 91 | Performed by: EMERGENCY MEDICINE

## 2024-10-29 PROCEDURE — 80307 DRUG TEST PRSMV CHEM ANLYZR: CPT | Performed by: EMERGENCY MEDICINE

## 2024-10-29 PROCEDURE — 84443 ASSAY THYROID STIM HORMONE: CPT | Performed by: FAMILY MEDICINE

## 2024-10-29 PROCEDURE — 81000 URINALYSIS NONAUTO W/SCOPE: CPT | Performed by: EMERGENCY MEDICINE

## 2024-10-29 PROCEDURE — 84484 ASSAY OF TROPONIN QUANT: CPT | Performed by: EMERGENCY MEDICINE

## 2024-10-29 PROCEDURE — 83690 ASSAY OF LIPASE: CPT | Performed by: EMERGENCY MEDICINE

## 2024-10-29 PROCEDURE — 83880 ASSAY OF NATRIURETIC PEPTIDE: CPT | Performed by: EMERGENCY MEDICINE

## 2024-10-29 PROCEDURE — 80053 COMPREHEN METABOLIC PANEL: CPT | Performed by: EMERGENCY MEDICINE

## 2024-10-29 PROCEDURE — 63600175 PHARM REV CODE 636 W HCPCS: Performed by: EMERGENCY MEDICINE

## 2024-10-29 PROCEDURE — 84484 ASSAY OF TROPONIN QUANT: CPT | Mod: 91 | Performed by: FAMILY MEDICINE

## 2024-10-29 PROCEDURE — 84439 ASSAY OF FREE THYROXINE: CPT | Performed by: FAMILY MEDICINE

## 2024-10-29 PROCEDURE — 63600175 PHARM REV CODE 636 W HCPCS: Performed by: FAMILY MEDICINE

## 2024-10-29 PROCEDURE — 93010 ELECTROCARDIOGRAM REPORT: CPT | Mod: ,,, | Performed by: INTERNAL MEDICINE

## 2024-10-29 PROCEDURE — 99232 SBSQ HOSP IP/OBS MODERATE 35: CPT | Mod: 25,,, | Performed by: INTERNAL MEDICINE

## 2024-10-29 PROCEDURE — 93005 ELECTROCARDIOGRAM TRACING: CPT

## 2024-10-29 PROCEDURE — 25000003 PHARM REV CODE 250: Performed by: EMERGENCY MEDICINE

## 2024-10-29 PROCEDURE — 85025 COMPLETE CBC W/AUTO DIFF WBC: CPT | Performed by: EMERGENCY MEDICINE

## 2024-10-29 PROCEDURE — 11000001 HC ACUTE MED/SURG PRIVATE ROOM

## 2024-10-29 PROCEDURE — 83036 HEMOGLOBIN GLYCOSYLATED A1C: CPT | Performed by: FAMILY MEDICINE

## 2024-10-29 RX ORDER — POTASSIUM CHLORIDE 20 MEQ/1
40 TABLET, EXTENDED RELEASE ORAL ONCE
Status: COMPLETED | OUTPATIENT
Start: 2024-10-29 | End: 2024-10-29

## 2024-10-29 RX ORDER — ONDANSETRON 8 MG/1
8 TABLET, ORALLY DISINTEGRATING ORAL EVERY 8 HOURS PRN
Status: DISCONTINUED | OUTPATIENT
Start: 2024-10-29 | End: 2024-10-29 | Stop reason: HOSPADM

## 2024-10-29 RX ORDER — POLYETHYLENE GLYCOL 3350 17 G/17G
17 POWDER, FOR SOLUTION ORAL DAILY
Status: DISCONTINUED | OUTPATIENT
Start: 2024-10-29 | End: 2024-10-29 | Stop reason: HOSPADM

## 2024-10-29 RX ORDER — POTASSIUM CHLORIDE 20 MEQ/1
20 TABLET, EXTENDED RELEASE ORAL DAILY
Status: DISCONTINUED | OUTPATIENT
Start: 2024-10-29 | End: 2024-10-29

## 2024-10-29 RX ORDER — SODIUM CHLORIDE 0.9 % (FLUSH) 0.9 %
10 SYRINGE (ML) INJECTION
Status: DISCONTINUED | OUTPATIENT
Start: 2024-10-29 | End: 2024-10-29 | Stop reason: HOSPADM

## 2024-10-29 RX ORDER — ACETAMINOPHEN 325 MG/1
650 TABLET ORAL EVERY 4 HOURS PRN
Status: DISCONTINUED | OUTPATIENT
Start: 2024-10-29 | End: 2024-10-29 | Stop reason: HOSPADM

## 2024-10-29 RX ORDER — MEPERIDINE HYDROCHLORIDE 25 MG/ML
25 INJECTION INTRAMUSCULAR; INTRAVENOUS; SUBCUTANEOUS
Status: COMPLETED | OUTPATIENT
Start: 2024-10-29 | End: 2024-10-29

## 2024-10-29 RX ORDER — NALOXONE HCL 0.4 MG/ML
0.02 VIAL (ML) INJECTION
Status: DISCONTINUED | OUTPATIENT
Start: 2024-10-29 | End: 2024-10-29 | Stop reason: HOSPADM

## 2024-10-29 RX ORDER — IBUPROFEN 200 MG
16 TABLET ORAL
Status: DISCONTINUED | OUTPATIENT
Start: 2024-10-29 | End: 2024-10-29 | Stop reason: HOSPADM

## 2024-10-29 RX ORDER — ENOXAPARIN SODIUM 100 MG/ML
40 INJECTION SUBCUTANEOUS EVERY 24 HOURS
Status: DISCONTINUED | OUTPATIENT
Start: 2024-10-29 | End: 2024-10-29 | Stop reason: HOSPADM

## 2024-10-29 RX ORDER — FUROSEMIDE 10 MG/ML
40 INJECTION INTRAMUSCULAR; INTRAVENOUS EVERY 12 HOURS
Status: DISCONTINUED | OUTPATIENT
Start: 2024-10-29 | End: 2024-10-29 | Stop reason: HOSPADM

## 2024-10-29 RX ORDER — ASPIRIN 81 MG/1
81 TABLET ORAL DAILY
Status: DISCONTINUED | OUTPATIENT
Start: 2024-10-29 | End: 2024-10-29 | Stop reason: HOSPADM

## 2024-10-29 RX ORDER — POTASSIUM CHLORIDE 20 MEQ/1
20 TABLET, EXTENDED RELEASE ORAL DAILY
Status: DISCONTINUED | OUTPATIENT
Start: 2024-10-30 | End: 2024-10-29 | Stop reason: HOSPADM

## 2024-10-29 RX ORDER — ONDANSETRON HYDROCHLORIDE 2 MG/ML
4 INJECTION, SOLUTION INTRAVENOUS
Status: COMPLETED | OUTPATIENT
Start: 2024-10-29 | End: 2024-10-29

## 2024-10-29 RX ORDER — CETIRIZINE HYDROCHLORIDE 10 MG/1
10 TABLET ORAL EVERY MORNING
Status: DISCONTINUED | OUTPATIENT
Start: 2024-10-29 | End: 2024-10-29 | Stop reason: HOSPADM

## 2024-10-29 RX ORDER — IBUPROFEN 200 MG
24 TABLET ORAL
Status: DISCONTINUED | OUTPATIENT
Start: 2024-10-29 | End: 2024-10-29 | Stop reason: HOSPADM

## 2024-10-29 RX ORDER — SODIUM CHLORIDE 0.9 % (FLUSH) 0.9 %
10 SYRINGE (ML) INJECTION EVERY 12 HOURS PRN
Status: DISCONTINUED | OUTPATIENT
Start: 2024-10-29 | End: 2024-10-29 | Stop reason: HOSPADM

## 2024-10-29 RX ORDER — GLUCAGON 1 MG
1 KIT INJECTION
Status: DISCONTINUED | OUTPATIENT
Start: 2024-10-29 | End: 2024-10-29 | Stop reason: HOSPADM

## 2024-10-29 RX ORDER — ATORVASTATIN CALCIUM 40 MG/1
80 TABLET, FILM COATED ORAL NIGHTLY
Status: DISCONTINUED | OUTPATIENT
Start: 2024-10-29 | End: 2024-10-29 | Stop reason: HOSPADM

## 2024-10-29 RX ADMIN — POLYETHYLENE GLYCOL 3350 17 G: 17 POWDER, FOR SOLUTION ORAL at 12:10

## 2024-10-29 RX ADMIN — MEPERIDINE HYDROCHLORIDE 25 MG: 25 INJECTION INTRAMUSCULAR; INTRAVENOUS; SUBCUTANEOUS at 10:10

## 2024-10-29 RX ADMIN — CETIRIZINE HYDROCHLORIDE 10 MG: 10 TABLET, FILM COATED ORAL at 10:10

## 2024-10-29 RX ADMIN — POTASSIUM CHLORIDE 40 MEQ: 1500 TABLET, EXTENDED RELEASE ORAL at 12:10

## 2024-10-29 RX ADMIN — FUROSEMIDE 40 MG: 10 INJECTION, SOLUTION INTRAMUSCULAR; INTRAVENOUS at 12:10

## 2024-10-29 RX ADMIN — PROMETHAZINE HYDROCHLORIDE 12.5 MG: 25 INJECTION INTRAMUSCULAR; INTRAVENOUS at 04:10

## 2024-10-29 RX ADMIN — MEPERIDINE HYDROCHLORIDE 25 MG: 25 INJECTION INTRAMUSCULAR; INTRAVENOUS; SUBCUTANEOUS at 04:10

## 2024-10-29 RX ADMIN — ONDANSETRON 4 MG: 2 INJECTION INTRAMUSCULAR; INTRAVENOUS at 08:10

## 2024-10-29 RX ADMIN — METOPROLOL SUCCINATE 12.5 MG: 25 TABLET, EXTENDED RELEASE ORAL at 12:10

## 2024-10-29 RX ADMIN — ASPIRIN 81 MG: 81 TABLET, COATED ORAL at 12:10

## 2024-10-31 LAB
OHS QRS DURATION: 172 MS
OHS QTC CALCULATION: 557 MS

## 2024-11-12 ENCOUNTER — CLINICAL SUPPORT (OUTPATIENT)
Dept: CARDIOLOGY | Facility: HOSPITAL | Age: 49
End: 2024-11-12
Payer: MEDICAID

## 2024-11-12 DIAGNOSIS — I50.22 CHRONIC SYSTOLIC (CONGESTIVE) HEART FAILURE: ICD-10-CM

## 2024-11-12 DIAGNOSIS — Z95.810 PRESENCE OF AUTOMATIC (IMPLANTABLE) CARDIAC DEFIBRILLATOR: ICD-10-CM

## 2024-11-12 DIAGNOSIS — I44.7 LEFT BUNDLE-BRANCH BLOCK, UNSPECIFIED: ICD-10-CM

## 2024-11-15 ENCOUNTER — PATIENT MESSAGE (OUTPATIENT)
Dept: CARDIOLOGY | Facility: CLINIC | Age: 49
End: 2024-11-15
Payer: MEDICAID

## 2024-11-19 ENCOUNTER — TELEPHONE (OUTPATIENT)
Dept: CARDIOLOGY | Facility: HOSPITAL | Age: 49
End: 2024-11-19
Payer: MEDICAID

## 2024-11-19 NOTE — TELEPHONE ENCOUNTER
Pt states she has been 'really stressed out' since  last week when her dog was hit by a car and killed.  She could 'feel her heart beating fast' on Nov 12th but it was not as bad as when she got shocked.  She took a deep breath and felt better.  Pt reports compliance with medications as listed in EMR, no recent changes.//jcg

## 2024-11-19 NOTE — TELEPHONE ENCOUNTER
Nurse note:  Called patient for symptom check and medication review.  No answer, left message requesting she return call to clinic.      [employer] alert:  Battery status is OK.  Measured values in normal range.  0% AT/AF [place].  Since last transmission   Patient had 2 episodes of SVT of which the longest lasted 1 minute and 15 seconds on 11/09/2024. Avg V rate 189 bpm.  VT2 episode detected on 11/12/2024 lasting 8 seconds with ATP 1 delivered. Avg V rate 190 bpm converted by ATP.   Patient had 3 episodes of slow non-sustained tachycardia of which the most recent detected on 11/10/2024. Longest NSVT 5 seconds at 203 bpm.

## 2024-12-18 ENCOUNTER — CLINICAL SUPPORT (OUTPATIENT)
Dept: CARDIOLOGY | Facility: HOSPITAL | Age: 49
End: 2024-12-18
Payer: MEDICAID

## 2024-12-18 ENCOUNTER — CLINICAL SUPPORT (OUTPATIENT)
Dept: CARDIOLOGY | Facility: HOSPITAL | Age: 49
End: 2024-12-18
Attending: INTERNAL MEDICINE
Payer: MEDICAID

## 2024-12-18 DIAGNOSIS — Z95.810 PRESENCE OF AUTOMATIC (IMPLANTABLE) CARDIAC DEFIBRILLATOR: ICD-10-CM

## 2024-12-18 DIAGNOSIS — I44.7 LEFT BUNDLE-BRANCH BLOCK, UNSPECIFIED: ICD-10-CM

## 2024-12-18 DIAGNOSIS — I50.22 CHRONIC SYSTOLIC (CONGESTIVE) HEART FAILURE: ICD-10-CM

## 2024-12-18 PROCEDURE — 93296 REM INTERROG EVL PM/IDS: CPT | Performed by: INTERNAL MEDICINE

## 2024-12-18 PROCEDURE — 93295 DEV INTERROG REMOTE 1/2/MLT: CPT | Mod: ,,, | Performed by: INTERNAL MEDICINE

## 2025-01-08 ENCOUNTER — TELEPHONE (OUTPATIENT)
Dept: ELECTROPHYSIOLOGY | Facility: CLINIC | Age: 50
End: 2025-01-08
Payer: MEDICAID

## 2025-01-08 NOTE — TELEPHONE ENCOUNTER
"[facility] transmission received showing nsT (probable VT) from 1/7/25 @ 01:03 am  Event ongoing at the end of the recording, true duration could not be determined    Phoned patient.  She stated "I'm a night person" and that she was moving plants from outdoors to indoors due to cold weather  She does not recall having CP or any other symptoms.  She feels fine currently, denies symptoms at the present time    Will continue to monitor.  "

## 2025-01-14 LAB
OHS CV AF BURDEN PERCENT: < 1
OHS CV BIV PACING PERCENT: 100 %
OHS CV DC REMOTE DEVICE TYPE: NORMAL
OHS CV ICD SHOCK: NO
OHS CV RV PACING PERCENT: 98 %

## 2025-02-03 RX ORDER — METOPROLOL SUCCINATE 25 MG/1
12.5 TABLET, EXTENDED RELEASE ORAL 2 TIMES DAILY
Qty: 60 TABLET | Refills: 11 | Status: SHIPPED | OUTPATIENT
Start: 2025-02-03

## 2025-02-03 RX ORDER — FUROSEMIDE 20 MG/1
20 TABLET ORAL 2 TIMES DAILY
Qty: 30 TABLET | Refills: 11 | Status: SHIPPED | OUTPATIENT
Start: 2025-02-03 | End: 2026-02-03

## 2025-02-18 ENCOUNTER — CLINICAL SUPPORT (OUTPATIENT)
Dept: SMOKING CESSATION | Facility: CLINIC | Age: 50
End: 2025-02-18
Payer: COMMERCIAL

## 2025-02-18 DIAGNOSIS — F17.200 NICOTINE DEPENDENCE, UNCOMPLICATED: Primary | ICD-10-CM

## 2025-02-18 NOTE — PROGRESS NOTES
Spoke with patient today in regard to smoking cessation progress for 12 month follow up. She states she is tobacco free. Congratulated patient on their success to remain tobacco free. Informed patient of benefit period, future follow ups and contact information if any further help is needed. Will complete/ resolve smart form for 3/6/12 month follow up for Quit # 1.

## 2025-03-17 ENCOUNTER — CLINICAL SUPPORT (OUTPATIENT)
Dept: CARDIOLOGY | Facility: HOSPITAL | Age: 50
End: 2025-03-17
Payer: MEDICAID

## 2025-03-17 DIAGNOSIS — Z95.810 PRESENCE OF AUTOMATIC (IMPLANTABLE) CARDIAC DEFIBRILLATOR: ICD-10-CM

## 2025-03-17 DIAGNOSIS — I44.7 LEFT BUNDLE-BRANCH BLOCK, UNSPECIFIED: ICD-10-CM

## 2025-03-17 DIAGNOSIS — I50.22 CHRONIC SYSTOLIC (CONGESTIVE) HEART FAILURE: ICD-10-CM

## 2025-03-19 ENCOUNTER — CLINICAL SUPPORT (OUTPATIENT)
Dept: CARDIOLOGY | Facility: HOSPITAL | Age: 50
End: 2025-03-19
Attending: INTERNAL MEDICINE
Payer: MEDICAID

## 2025-03-19 ENCOUNTER — CLINICAL SUPPORT (OUTPATIENT)
Dept: CARDIOLOGY | Facility: HOSPITAL | Age: 50
End: 2025-03-19
Payer: MEDICAID

## 2025-03-19 DIAGNOSIS — I44.7 LEFT BUNDLE-BRANCH BLOCK, UNSPECIFIED: ICD-10-CM

## 2025-03-19 DIAGNOSIS — I50.22 CHRONIC SYSTOLIC (CONGESTIVE) HEART FAILURE: ICD-10-CM

## 2025-03-19 DIAGNOSIS — Z95.810 PRESENCE OF AUTOMATIC (IMPLANTABLE) CARDIAC DEFIBRILLATOR: ICD-10-CM

## 2025-03-19 PROCEDURE — 93296 REM INTERROG EVL PM/IDS: CPT | Performed by: INTERNAL MEDICINE

## 2025-03-19 PROCEDURE — 93295 DEV INTERROG REMOTE 1/2/MLT: CPT | Mod: ,,, | Performed by: INTERNAL MEDICINE

## 2025-03-19 RX ORDER — FUROSEMIDE 20 MG/1
20 TABLET ORAL 2 TIMES DAILY
Qty: 60 TABLET | Refills: 11 | Status: SHIPPED | OUTPATIENT
Start: 2025-03-19 | End: 2026-03-19

## 2025-04-07 ENCOUNTER — TELEPHONE (OUTPATIENT)
Dept: CARDIOLOGY | Facility: CLINIC | Age: 50
End: 2025-04-07
Payer: MEDICAID

## 2025-06-01 ENCOUNTER — ON-DEMAND VIRTUAL (OUTPATIENT)
Dept: URGENT CARE | Facility: CLINIC | Age: 50
End: 2025-06-01
Payer: MEDICAID

## 2025-06-01 DIAGNOSIS — R11.0 NAUSEA: Primary | ICD-10-CM

## 2025-06-01 PROCEDURE — 98004 SYNCH AUDIO-VIDEO EST SF 10: CPT | Mod: 95,,, | Performed by: NURSE PRACTITIONER

## 2025-06-01 RX ORDER — ONDANSETRON 4 MG/1
4 TABLET, ORALLY DISINTEGRATING ORAL EVERY 8 HOURS PRN
Qty: 12 TABLET | Refills: 0 | Status: SHIPPED | OUTPATIENT
Start: 2025-06-01

## 2025-06-01 RX ORDER — ONDANSETRON 4 MG/1
4 TABLET, ORALLY DISINTEGRATING ORAL EVERY 8 HOURS PRN
Qty: 12 TABLET | Refills: 0 | Status: SHIPPED | OUTPATIENT
Start: 2025-06-01 | End: 2025-06-01

## 2025-06-19 ENCOUNTER — CLINICAL SUPPORT (OUTPATIENT)
Dept: CARDIOLOGY | Facility: HOSPITAL | Age: 50
End: 2025-06-19
Attending: INTERNAL MEDICINE
Payer: MEDICAID

## 2025-06-19 ENCOUNTER — CLINICAL SUPPORT (OUTPATIENT)
Dept: CARDIOLOGY | Facility: HOSPITAL | Age: 50
End: 2025-06-19
Payer: MEDICAID

## 2025-06-19 DIAGNOSIS — Z95.810 PRESENCE OF AUTOMATIC (IMPLANTABLE) CARDIAC DEFIBRILLATOR: ICD-10-CM

## 2025-06-19 DIAGNOSIS — I44.7 LEFT BUNDLE-BRANCH BLOCK, UNSPECIFIED: ICD-10-CM

## 2025-06-19 DIAGNOSIS — I50.22 CHRONIC SYSTOLIC (CONGESTIVE) HEART FAILURE: ICD-10-CM

## 2025-06-19 PROCEDURE — 93295 DEV INTERROG REMOTE 1/2/MLT: CPT | Mod: ,,, | Performed by: INTERNAL MEDICINE

## 2025-06-19 PROCEDURE — 93296 REM INTERROG EVL PM/IDS: CPT | Performed by: INTERNAL MEDICINE

## 2025-06-24 LAB
OHS CV AF BURDEN PERCENT: < 1
OHS CV BIV PACING PERCENT: 100 %
OHS CV DC REMOTE DEVICE TYPE: NORMAL
OHS CV RV PACING PERCENT: 98 %

## 2025-07-16 ENCOUNTER — PATIENT MESSAGE (OUTPATIENT)
Dept: CARDIOLOGY | Facility: CLINIC | Age: 50
End: 2025-07-16
Payer: MEDICAID

## 2025-07-17 ENCOUNTER — DOCUMENTATION ONLY (OUTPATIENT)
Dept: CARDIAC REHAB | Facility: HOSPITAL | Age: 50
End: 2025-07-17
Payer: MEDICAID

## 2025-07-17 NOTE — PROGRESS NOTES
I called and spoke with patient about cardiac rehab, she was referred by Sanjana Kearns NP  initially she was interested in the home cardiac rehab but do to certain conditions it was recommended that she needed more of a in person program.        I informed pt on what it is exactly we do and how the cardiac program work and she verbalized that it is something she wants to try.      I advised her that Dima RN is out this week and will return on next week and he will follow up on the next steps and pt verbalized understanding.PAPO

## 2025-07-25 ENCOUNTER — HOSPITAL ENCOUNTER (OUTPATIENT)
Dept: CARDIOLOGY | Facility: HOSPITAL | Age: 50
Discharge: HOME | End: 2025-07-25
Attending: INTERNAL MEDICINE
Payer: MEDICAID

## 2025-07-25 ENCOUNTER — OFFICE VISIT (OUTPATIENT)
Dept: CARDIOLOGY | Facility: CLINIC | Age: 50
End: 2025-07-25
Payer: MEDICAID

## 2025-07-25 VITALS
BODY MASS INDEX: 24.05 KG/M2 | HEART RATE: 80 BPM | DIASTOLIC BLOOD PRESSURE: 70 MMHG | WEIGHT: 153.25 LBS | HEIGHT: 67 IN | SYSTOLIC BLOOD PRESSURE: 100 MMHG

## 2025-07-25 DIAGNOSIS — Z95.810 PRESENCE OF CARDIAC RESYNCHRONIZATION THERAPY DEFIBRILLATOR (CRT-D): ICD-10-CM

## 2025-07-25 DIAGNOSIS — I44.7 LBBB (LEFT BUNDLE BRANCH BLOCK): ICD-10-CM

## 2025-07-25 DIAGNOSIS — Z72.0 TOBACCO ABUSE: ICD-10-CM

## 2025-07-25 DIAGNOSIS — I50.22 CHF (CONGESTIVE HEART FAILURE), NYHA CLASS III, CHRONIC, SYSTOLIC: Primary | ICD-10-CM

## 2025-07-25 DIAGNOSIS — I50.42 CHRONIC COMBINED SYSTOLIC AND DIASTOLIC HEART FAILURE: ICD-10-CM

## 2025-07-25 DIAGNOSIS — I35.0 NONRHEUMATIC AORTIC VALVE STENOSIS: ICD-10-CM

## 2025-07-25 DIAGNOSIS — I50.20 HEART FAILURE WITH REDUCED EJECTION FRACTION, NYHA CLASS III: ICD-10-CM

## 2025-07-25 DIAGNOSIS — Z45.02 ICD (IMPLANTABLE CARDIOVERTER-DEFIBRILLATOR) BATTERY DEPLETION: ICD-10-CM

## 2025-07-25 PROCEDURE — 99999 PR PBB SHADOW E&M-EST. PATIENT-LVL III: CPT | Mod: PBBFAC,,, | Performed by: NURSE PRACTITIONER

## 2025-07-25 PROCEDURE — 99213 OFFICE O/P EST LOW 20 MIN: CPT | Mod: PBBFAC | Performed by: NURSE PRACTITIONER

## 2025-07-25 RX ORDER — ACETAMINOPHEN 500 MG
1 TABLET ORAL ONCE
Qty: 1 EACH | Refills: 0 | Status: SHIPPED | OUTPATIENT
Start: 2025-07-25 | End: 2025-07-25 | Stop reason: SDUPTHER

## 2025-07-25 RX ORDER — ACETAMINOPHEN 500 MG
1 TABLET ORAL ONCE
Qty: 1 EACH | Refills: 0 | Status: SHIPPED | OUTPATIENT
Start: 2025-07-25 | End: 2025-07-25

## 2025-07-25 NOTE — PROGRESS NOTES
Subjective:   Patient ID:  Jaqueline Gutierrez is a 50 y.o. female who presents for evaluation of Follow-up      Follow-up  Pertinent negatives include no abdominal pain, chest pain, coughing, headaches, myalgias, nausea, numbness, vomiting or weakness.       Jaqueline Gutierrez is a 49 year old female who presents to Arrhythmia clinic for CHF follow up/discuss CRT implant.    Her current medical conditions include CHF, HFrEF of 20%, LifeVest, Thyroid disease, LBBB, Aortic Stenosis, MR, TR, CAD.     She has had numerous medication failures due to hypotension and dizziness/LH.     She is only taking Toprol XL 12.5 mg BID and Lasix 20 mg BID.     Unable to tolerate Entresto due to hypotension and near syncopal events.     She does endorse shortness of breath with any exertional activities. She does have bendopnea as well today.     Today, she is NYHA Stage C FC III.     Denies chest pain or anginal equivalents. Denies orthopnea, PND or abdominal bloating. Reports regular walking without any issues lately. NO leg swelling or claudications. No recent falls, syncope or near syncopal events. Reports compliance with medications and dietary restrictions. NO CNS complaints to suggest TIA or CVA today. No signs of abnormal bleeding on ASA daily.     Ambulated in hallway- developed shortness of breath and increased work of breathing after 150 feet.     10/15/2024 update    Jaqueline Gutierrez returns for 3 month follow up with device check.     Device check completed today in office- well functioning device, 3 episodes of VT on 10/10/2024 along with missed dose of BB and overheated. No recurrent arrhythmias since October 10th.   %, 0 PVC burden.     Reports compliance with medications.     BP well controlled today in office.   Doing well with sodium restrictions.     Feeling better since device implant.     Denies chest pain or anginal equivalents. No shortness of breath, JOHNSON or palpitations. Denies orthopnea, PND  or abdominal bloating. Reports regular walking without any issues lately. NO leg swelling or claudications. No recent falls, syncope or near syncopal events. Reports compliance with medications and dietary restrictions. NO CNS complaints to suggest TIA or CVA today. No signs of abnormal bleeding on ASA daily.     2025 update    Jaqueline Gutierrez returns for follow up with device check.     She has started smoking since last visit.     Has increased stress recently.     Still has intermittent issues with shortness of breath.     Trying to be more mindful of sodium restrictions.   Reports compliance with medications.     Had issues with low BP when taking Farxiga and improved since cessation.     Monitoring her weight regularly.   BP stable today in office.     Trying to stay active regularly.   Would benefit from cardiac rehab program.     Denies chest pain or anginal equivalents. No shortness of breath, JOHNSON or palpitations. Denies orthopnea, PND or abdominal bloating. Reports regular walking without any issues lately. NO leg swelling or claudications. No recent falls, syncope or near syncopal events. Reports compliance with medications and dietary restrictions. NO CNS complaints to suggest TIA or CVA today. No signs of abnormal bleeding on ASA daily.     Device check completed today-  Well functioning CRT-Dx device, CRT pacing 100%, 4 HVRs correlate to slow nST on 7/3/2025. Stable Thoracic impedance.     Past Medical History:   Diagnosis Date    CHF (congestive heart failure)     Elevated troponin 2024    Substance abuse     Thyroid disease        Past Surgical History:   Procedure Laterality Date    CATHETERIZATION OF BOTH LEFT AND RIGHT HEART N/A 2024    Procedure: CATHETERIZATION, HEART, BOTH LEFT AND RIGHT;  Surgeon: Henry Menard MD;  Location: [facility] CATH LAB;  Service: Cardiology;  Laterality: N/A;     SECTION      HYSTERECTOMY      INSERTION OF BIVENTRICULAR IMPLANTABLE  CARDIOVERTER-DEFIBRILLATOR (ICD) Left 5/9/2024    Procedure: INSERTION, ICD, BIVENTRICULAR/CRT-D;  Surgeon: Kel Garcia MD;  Location: [facility] CATH LAB;  Service: Cardiology;  Laterality: Left;  [place]iotron[place]    KNEE SURGERY         Social History     Tobacco Use    Smoking status: Former     Current packs/day: 0.50     Average packs/day: 0.5 packs/day for 40.6 years (20.3 ttl pk-yrs)     Types: Cigarettes     Start date: 1985     Passive exposure: Current    Smokeless tobacco: Never   Substance Use Topics    Alcohol use: Yes     Comment: occasional    Drug use: Yes     Types: Marijuana, Methamphetamines       No family history on file.    Wt Readings from Last 3 Encounters:   07/25/25 69.5 kg (153 lb 3.5 oz)   10/29/24 73 kg (161 lb)   10/15/24 73 kg (160 lb 15 oz)     Temp Readings from Last 3 Encounters:   10/29/24 98.7 °F (37.1 °C) (Oral)   05/09/24 98.1 °F (36.7 °C)   01/23/24 98.3 °F (36.8 °C)     BP Readings from Last 3 Encounters:   07/25/25 100/70   10/29/24 110/71   10/15/24 124/82     Pulse Readings from Last 3 Encounters:   07/25/25 80   10/29/24 96   10/15/24 80       Current Outpatient Medications on File Prior to Visit   Medication Sig Dispense Refill    furosemide (LASIX) 20 MG tablet Take 1 tablet (20 mg total) by mouth 2 (two) times daily. 60 tablet 11    metoprolol succinate (TOPROL-XL) 25 MG 24 hr tablet Take 0.5 tablets (12.5 mg total) by mouth 2 (two) times daily. 60 tablet 11    ondansetron (ZOFRAN-ODT) 4 MG TbDL Take 2 tablets (8 mg total) by mouth 2 (two) times daily. 30 tablet 0    ondansetron (ZOFRAN-ODT) 4 MG TbDL Take 1 tablet (4 mg total) by mouth every 8 (eight) hours as needed (nausea). 12 tablet 0    albuterol (PROVENTIL/VENTOLIN HFA) 90 mcg/actuation inhaler Inhale 2 puffs into the lungs every 4 (four) hours as needed.      aspirin (ECOTRIN) 81 MG EC tablet Take 1 tablet (81 mg total) by mouth once daily. 90 tablet 3    cetirizine (ZYRTEC) 10 MG tablet Take 1 tablet by mouth  every morning.      nitroGLYCERIN (NITROSTAT) 0.4 MG SL tablet Place 1 tablet (0.4 mg total) under the tongue every 5 (five) minutes as needed for Chest pain. 25 tablet 1    rosuvastatin (CRESTOR) 20 MG tablet Take 1 tablet (20 mg total) by mouth once daily. 90 tablet 3    [DISCONTINUED] dapagliflozin propanediol (FARXIGA) 10 mg tablet Take 1 tablet (10 mg total) by mouth once daily. 90 tablet 3     No current facility-administered medications on file prior to visit.       No cardiac monitor results found for the past 12 months    Results for orders placed during the hospital encounter of 04/10/24    Echo    Interpretation Summary    Left Ventricle: The left ventricle is severely dilated. Normal wall thickness. There is eccentric hypertrophy. Regional wall motion abnormalities present. Septal motion is consistent with bundle branch block. There is severely reduced systolic function with a visually estimated ejection fraction of 15 - 20%. Ejection fraction by visual approximation is 20%.    Right Ventricle: Normal right ventricular cavity size. Wall thickness is normal. Right ventricle wall motion  is normal. Systolic function is normal.    Left Atrium: Left atrium is severely dilated.    Aortic Valve: The aortic valve is a trileaflet valve. There is moderate stenosis. Aortic valve area by VTI is 1.37 cm². Aortic valve peak velocity is 1.65 m/s. Mean gradient is 6 mmHg. The dimensionless index is 0.45.    Mitral Valve: There is mild to moderate regurgitation.    Tricuspid Valve: There is mild regurgitation.  No pulmonary hypertension.    IVC/SVC: Normal venous pressure at 3 mmHg.    No results found for this or any previous visit.        Results for orders placed or performed during the hospital encounter of 10/29/24   EKG 12-lead    Collection Time: 10/29/24  4:44 AM   Result Value Ref Range    QRS Duration 172 ms    [facility] QTC Calculation 557 ms    Narrative    Test Reason : R10.10,    Vent. Rate : 084 BPM     Atrial  "Rate : 084 BPM     P-R Int : 130 ms          QRS Dur : 172 ms      QT Int : 472 ms       P-R-T Axes : 080 062 092 degrees     QTc Int : 557 ms    Atrial-sensed ventricular-paced rhythm  Biventricular pacemaker detected  Abnormal ECG  When compared with ECG of 09-MAY-2024 18:26,  Vent. rate has increased BY   4 BPM  Confirmed by MD MARIA ELENA, ANUPAMA ([id number]) on 10/31/2024 7:13:27 PM    Referred By: [email or web address]   SELF           Confirmed By:ANUPAMA ARREDONDO MD         Review of Systems   Constitutional: Positive for malaise/fatigue.   HENT:  Negative for hearing loss and hoarse voice.    Eyes:  Negative for blurred vision and visual disturbance.   Cardiovascular:  Positive for dyspnea on exertion and palpitations. Negative for chest pain, claudication, irregular heartbeat, leg swelling, near-syncope, orthopnea, paroxysmal nocturnal dyspnea and syncope.   Respiratory:  Negative for cough, hemoptysis, shortness of breath, sleep disturbances due to breathing, snoring and wheezing.    Endocrine: Negative for cold intolerance and heat intolerance.   Hematologic/Lymphatic: Does not bruise/bleed easily.   Skin:  Negative for color change, dry skin and nail changes.   Musculoskeletal:  Positive for arthritis and back pain. Negative for joint pain and myalgias.   Gastrointestinal:  Positive for bloating. Negative for abdominal pain, constipation, nausea and vomiting.   Genitourinary:  Negative for dysuria, flank pain, hematuria and hesitancy.   Neurological:  Negative for headaches, light-headedness, loss of balance, numbness, paresthesias and weakness.   Psychiatric/Behavioral:  Negative for altered mental status.    Allergic/Immunologic: Negative for environmental allergies.         Objective:/70 (BP Location: Left arm, Patient Position: Sitting)   Pulse 80   Ht 5' 7" (1.702 m)   Wt 69.5 kg (153 lb 3.5 oz)   LMP  (LMP Unknown)   BMI 24.00 kg/m²      Physical Exam  Vitals and nursing note reviewed.   Constitutional:       General: " She is not in acute distress.     Appearance: Normal appearance. She is well-developed. She is not ill-appearing.   HENT:      Head: Normocephalic and atraumatic.      Nose: Nose normal.      Mouth/Throat:      Mouth: Mucous membranes are moist.   Eyes:      Pupils: Pupils are equal, round, and reactive to light.   Neck:      Thyroid: No thyromegaly.      Vascular: No JVD.      Trachea: No tracheal deviation.   Cardiovascular:      Rate and Rhythm: Normal rate and regular rhythm.      Chest Wall: PMI is not displaced.      Pulses: Intact distal pulses.           Radial pulses are 2+ on the right side and 2+ on the left side.        Dorsalis pedis pulses are 2+ on the right side and 2+ on the left side.      Heart sounds: S1 normal and S2 normal. Heart sounds not distant. No murmur heard.  Pulmonary:      Effort: Pulmonary effort is normal. No respiratory distress.      Breath sounds: Normal breath sounds. No wheezing.   Abdominal:      General: Bowel sounds are normal. There is no distension.      Palpations: Abdomen is soft.      Tenderness: There is no abdominal tenderness.   Musculoskeletal:         General: No swelling. Normal range of motion.      Cervical back: Full passive range of motion without pain, normal range of motion and neck supple.      Right lower leg: No edema.      Left lower leg: No edema.      Right ankle: No swelling.      Left ankle: No swelling.   Skin:     General: Skin is warm and dry.      Capillary Refill: Capillary refill takes less than 2 seconds.      Nails: There is no clubbing.   Neurological:      General: No focal deficit present.      Mental Status: She is alert and oriented to person, place, and time.      Motor: No weakness.   Psychiatric:         Speech: Speech normal.         Behavior: Behavior normal.         Thought Content: Thought content normal.         Judgment: Judgment normal.         Lab Results   Component Value Date    CHOL 215 (H) 01/22/2024     Lab Results    Component Value Date    HDL 39 (L) 01/22/2024     Lab Results   Component Value Date    LDLCALC 151.4 01/22/2024     Lab Results   Component Value Date    TRIG 123 01/22/2024     Lab Results   Component Value Date    CHOLHDL 18.1 (L) 01/22/2024       Chemistry        Component Value Date/Time     10/29/2024 0441    K 3.4 (L) 10/29/2024 0441    CL 98 10/29/2024 0441    CO2 30 (H) 10/29/2024 0441    BUN 11 10/29/2024 0441    CREATININE 1.3 10/29/2024 0441     (H) 10/29/2024 0441        Component Value Date/Time    CALCIUM 9.4 10/29/2024 0441    ALKPHOS 93 10/29/2024 0441    AST 28 10/29/2024 0441    ALT 26 10/29/2024 0441    BILITOT 0.9 10/29/2024 0441    ESTGFRAFRICA >60 07/17/2021 0639    EGFRNONAA >60 07/17/2021 0639          Lab Results   Component Value Date    TSH 0.919 10/29/2024     Lab Results   Component Value Date    INR 1.0 04/23/2024    INR 1.0 12/30/2023    INR 1.0 05/31/2010     Lab Results   Component Value Date    WBC 5.11 10/29/2024    HGB 15.7 10/29/2024    HCT 43.9 10/29/2024    MCV 89 10/29/2024     10/29/2024          Assessment:      1. CHF (congestive heart failure), NYHA class III, chronic, systolic    2. Heart failure with reduced ejection fraction, NYHA class III    3. Nonrheumatic aortic valve stenosis    4. LBBB (left bundle branch block)    5. Tobacco abuse    6. Presence of cardiac resynchronization therapy defibrillator (CRT-D)            Plan:     Unable to tolerate ARNI or SGLT2 or MRAS due to hypotension and near syncopal events.    Continue Toprol XL 12.5 mg BID  Continue Lasix 20 mg BID  Dash diet 2 gm sodium restriction  1500 ml fluid restrictions  Daily weights    Start cardiac rehab program once approved  Resume smoking cessation given recurrence of tobacco abuse. Willing to attempt another quit attempt.     RTC in 6 m with device check.     Nicole May, FNP-C [facility]

## 2025-07-28 LAB
OHS CV AF BURDEN PERCENT: < 1
OHS CV BIV PACING PERCENT: 100 %
OHS CV DC REMOTE DEVICE TYPE: NORMAL

## 2025-08-02 DIAGNOSIS — R07.9 CHEST PAIN, UNSPECIFIED TYPE: ICD-10-CM

## 2025-08-04 RX ORDER — NITROGLYCERIN 0.4 MG/1
0.4 TABLET SUBLINGUAL EVERY 5 MIN PRN
Qty: 25 TABLET | Refills: 0 | Status: SHIPPED | OUTPATIENT
Start: 2025-08-04

## 2025-08-08 ENCOUNTER — PATIENT MESSAGE (OUTPATIENT)
Dept: CARDIOLOGY | Facility: CLINIC | Age: 50
End: 2025-08-08
Payer: MEDICAID

## 2025-08-08 ENCOUNTER — TELEPHONE (OUTPATIENT)
Dept: CARDIOLOGY | Facility: CLINIC | Age: 50
End: 2025-08-08
Payer: MEDICAID

## 2025-08-08 DIAGNOSIS — I44.7 LBBB (LEFT BUNDLE BRANCH BLOCK): ICD-10-CM

## 2025-08-08 DIAGNOSIS — I50.42 CHRONIC COMBINED SYSTOLIC AND DIASTOLIC HEART FAILURE: ICD-10-CM

## 2025-08-08 DIAGNOSIS — Z45.02 ICD (IMPLANTABLE CARDIOVERTER-DEFIBRILLATOR) BATTERY DEPLETION: Primary | ICD-10-CM

## 2025-08-24 ENCOUNTER — HOSPITAL ENCOUNTER (EMERGENCY)
Facility: HOSPITAL | Age: 50
Discharge: HOME | End: 2025-08-24
Attending: EMERGENCY MEDICINE
Payer: MEDICAID

## 2025-08-24 VITALS
OXYGEN SATURATION: 98 % | BODY MASS INDEX: 24.28 KG/M2 | SYSTOLIC BLOOD PRESSURE: 132 MMHG | DIASTOLIC BLOOD PRESSURE: 72 MMHG | HEART RATE: 90 BPM | RESPIRATION RATE: 17 BRPM | WEIGHT: 155 LBS | TEMPERATURE: 98 F

## 2025-08-24 DIAGNOSIS — S81.812A LACERATION OF LEFT LOWER EXTREMITY, INITIAL ENCOUNTER: Primary | ICD-10-CM

## 2025-08-24 DIAGNOSIS — R52 PAIN: ICD-10-CM

## 2025-08-24 PROCEDURE — 25000003 PHARM REV CODE 250: Performed by: NURSE PRACTITIONER

## 2025-08-24 PROCEDURE — 99283 EMERGENCY DEPT VISIT LOW MDM: CPT | Mod: 25

## 2025-08-24 PROCEDURE — 63600175 PHARM REV CODE 636 W HCPCS: Performed by: NURSE PRACTITIONER

## 2025-08-24 PROCEDURE — 12002 RPR S/N/AX/GEN/TRNK2.6-7.5CM: CPT

## 2025-08-24 RX ORDER — LIDOCAINE HYDROCHLORIDE 10 MG/ML
10 INJECTION, SOLUTION EPIDURAL; INFILTRATION; INTRACAUDAL; PERINEURAL
Status: COMPLETED | OUTPATIENT
Start: 2025-08-24 | End: 2025-08-24

## 2025-08-24 RX ORDER — ONDANSETRON 4 MG/1
4 TABLET, ORALLY DISINTEGRATING ORAL
Status: COMPLETED | OUTPATIENT
Start: 2025-08-24 | End: 2025-08-24

## 2025-08-24 RX ADMIN — ONDANSETRON 4 MG: 4 TABLET, ORALLY DISINTEGRATING ORAL at 10:08

## 2025-08-24 RX ADMIN — LIDOCAINE HYDROCHLORIDE 100 MG: 10 INJECTION, SOLUTION EPIDURAL; INFILTRATION; INTRACAUDAL at 10:08

## (undated) DEVICE — NDL PERCUTANEOUS 21G 7CM VASC

## (undated) DEVICE — CATH SWAN GANZ 7FR 110CM

## (undated) DEVICE — KIT SYR REUSABLE

## (undated) DEVICE — ADAPTER CPS SAFESHEATH

## (undated) DEVICE — CATH DIAG IMPULSE 6FR FR4

## (undated) DEVICE — PACK PACER PERMANENT OMC

## (undated) DEVICE — GUIDEWIRE CPS DIRECT PL 47CM

## (undated) DEVICE — SHEATH INTRODUCER 7FR 11CM

## (undated) DEVICE — PENCIL SMOKE EVAC ROCKER 70MM

## (undated) DEVICE — OIL SILICONE SJM

## (undated) DEVICE — GUIDEWIRE .035 175CM VERSACORE

## (undated) DEVICE — CATH BLLN ATTAIN VENOGRAM

## (undated) DEVICE — POWDER ARISTA AH 1GM

## (undated) DEVICE — GUIDEWIRE WHISPER ES .014X190

## (undated) DEVICE — SUT 3-0 VICRYL / SH (J416)

## (undated) DEVICE — SHEATH INTRODUCER 6FR 11CM

## (undated) DEVICE — CONTRAST VISIPAQUE 150ML

## (undated) DEVICE — PAD DEFIB CADENCE ADULT R2

## (undated) DEVICE — ANGIOTOUCH KIT

## (undated) DEVICE — CATH DIAG IMPULSE 6FR FL4

## (undated) DEVICE — CATH QUADRIPOLAR 6FRX120CM

## (undated) DEVICE — CATH IMPULSE PIGTAIL 6F 110CM

## (undated) DEVICE — GUIDEWIRD CPS COURIER FIRM

## (undated) DEVICE — SHEATH SAFE ULTRA 10FRX13CM

## (undated) DEVICE — SUT SILK 2-0 PS 18IN BLACK

## (undated) DEVICE — OMNIPAQUE 300MG 150ML VIAL

## (undated) DEVICE — GUIDE WIRE J-TIP 260CM .025

## (undated) DEVICE — SYR BULB 60CC IRRIGATION

## (undated) DEVICE — PAD GROUNDING DISPER ELECTRODE

## (undated) DEVICE — WIRE GUIDE TEFLON 3CM .035 145

## (undated) DEVICE — Device

## (undated) DEVICE — PACK HEART CATH BR

## (undated) DEVICE — SUT 4/0 18IN COATED VICRYL

## (undated) DEVICE — DRESSING AQUACEL AG ADV 3.5X6

## (undated) DEVICE — ADHESIVE DERMABOND ADVANCED

## (undated) DEVICE — SCALPEL SZ 10 RETRACTABLE